# Patient Record
Sex: MALE | Race: WHITE | NOT HISPANIC OR LATINO
[De-identification: names, ages, dates, MRNs, and addresses within clinical notes are randomized per-mention and may not be internally consistent; named-entity substitution may affect disease eponyms.]

---

## 2017-01-31 ENCOUNTER — TRANSCRIPTION ENCOUNTER (OUTPATIENT)
Age: 72
End: 2017-01-31

## 2017-02-19 PROBLEM — Z00.00 ENCOUNTER FOR PREVENTIVE HEALTH EXAMINATION: Status: ACTIVE | Noted: 2017-02-19

## 2017-03-29 ENCOUNTER — APPOINTMENT (OUTPATIENT)
Dept: UROLOGY | Facility: CLINIC | Age: 72
End: 2017-03-29

## 2017-04-24 ENCOUNTER — OUTPATIENT (OUTPATIENT)
Dept: OUTPATIENT SERVICES | Facility: HOSPITAL | Age: 72
LOS: 1 days | Discharge: HOME | End: 2017-04-24

## 2017-06-27 DIAGNOSIS — M54.6 PAIN IN THORACIC SPINE: ICD-10-CM

## 2017-07-10 ENCOUNTER — APPOINTMENT (OUTPATIENT)
Dept: UROLOGY | Facility: CLINIC | Age: 72
End: 2017-07-10

## 2017-07-10 VITALS
HEART RATE: 93 BPM | HEIGHT: 70 IN | DIASTOLIC BLOOD PRESSURE: 81 MMHG | BODY MASS INDEX: 34.93 KG/M2 | SYSTOLIC BLOOD PRESSURE: 128 MMHG | WEIGHT: 244 LBS

## 2017-07-10 DIAGNOSIS — Z86.79 PERSONAL HISTORY OF OTHER DISEASES OF THE CIRCULATORY SYSTEM: ICD-10-CM

## 2017-07-10 DIAGNOSIS — E78.00 PURE HYPERCHOLESTEROLEMIA, UNSPECIFIED: ICD-10-CM

## 2017-07-10 DIAGNOSIS — Z78.9 OTHER SPECIFIED HEALTH STATUS: ICD-10-CM

## 2017-07-10 DIAGNOSIS — F17.210 NICOTINE DEPENDENCE, CIGARETTES, UNCOMPLICATED: ICD-10-CM

## 2017-07-10 DIAGNOSIS — I10 ESSENTIAL (PRIMARY) HYPERTENSION: ICD-10-CM

## 2017-07-10 RX ORDER — ATORVASTATIN CALCIUM 80 MG/1
80 TABLET, FILM COATED ORAL
Refills: 0 | Status: ACTIVE | COMMUNITY

## 2017-08-18 ENCOUNTER — OUTPATIENT (OUTPATIENT)
Dept: OUTPATIENT SERVICES | Facility: HOSPITAL | Age: 72
LOS: 1 days | Discharge: HOME | End: 2017-08-18

## 2017-08-18 DIAGNOSIS — R05 COUGH: ICD-10-CM

## 2017-10-11 ENCOUNTER — OTHER (OUTPATIENT)
Age: 72
End: 2017-10-11

## 2017-10-16 ENCOUNTER — APPOINTMENT (OUTPATIENT)
Dept: UROLOGY | Facility: CLINIC | Age: 72
End: 2017-10-16

## 2017-10-16 VITALS
HEIGHT: 70 IN | SYSTOLIC BLOOD PRESSURE: 138 MMHG | BODY MASS INDEX: 35.79 KG/M2 | DIASTOLIC BLOOD PRESSURE: 91 MMHG | WEIGHT: 250 LBS | HEART RATE: 94 BPM

## 2017-10-26 ENCOUNTER — OTHER (OUTPATIENT)
Age: 72
End: 2017-10-26

## 2017-10-27 ENCOUNTER — OUTPATIENT (OUTPATIENT)
Dept: OUTPATIENT SERVICES | Facility: HOSPITAL | Age: 72
LOS: 1 days | Discharge: HOME | End: 2017-10-27

## 2017-10-27 DIAGNOSIS — R29.3 ABNORMAL POSTURE: ICD-10-CM

## 2017-10-27 DIAGNOSIS — R52 PAIN, UNSPECIFIED: ICD-10-CM

## 2017-10-30 ENCOUNTER — APPOINTMENT (OUTPATIENT)
Dept: UROLOGY | Facility: CLINIC | Age: 72
End: 2017-10-30

## 2017-10-30 ENCOUNTER — APPOINTMENT (OUTPATIENT)
Dept: UROLOGY | Facility: CLINIC | Age: 72
End: 2017-10-30
Payer: MEDICARE

## 2017-10-30 VITALS
BODY MASS INDEX: 35.79 KG/M2 | HEART RATE: 89 BPM | DIASTOLIC BLOOD PRESSURE: 95 MMHG | SYSTOLIC BLOOD PRESSURE: 137 MMHG | HEIGHT: 70 IN | WEIGHT: 250 LBS

## 2017-10-30 PROCEDURE — 99213 OFFICE O/P EST LOW 20 MIN: CPT | Mod: 57,25

## 2017-10-30 PROCEDURE — 11980 IMPLANT HORMONE PELLET(S): CPT

## 2017-10-30 PROCEDURE — S0189: CPT

## 2017-10-30 NOTE — PHYSICAL EXAM
[General Appearance - Well Developed] : well developed [General Appearance - Well Nourished] : well nourished [Normal Appearance] : normal appearance [Well Groomed] : well groomed [General Appearance - In No Acute Distress] : no acute distress [Abdomen Soft] : soft [Abdomen Tenderness] : non-tender [Costovertebral Angle Tenderness] : no ~M costovertebral angle tenderness [Penis Abnormality] : normal circumcised penis [Epididymis] : the epididymides were normal [Testes Tenderness] : no tenderness of the testes [Testes Mass (___cm)] : there were no testicular masses [FreeTextEntry1] : mild peripheral efdema [] : no respiratory distress [Respiration, Rhythm And Depth] : normal respiratory rhythm and effort [Exaggerated Use Of Accessory Muscles For Inspiration] : no accessory muscle use [Oriented To Time, Place, And Person] : oriented to person, place, and time [Affect] : the affect was normal [Mood] : the mood was normal [Not Anxious] : not anxious [Normal Station and Gait] : the gait and station were normal for the patient's age

## 2017-10-30 NOTE — ASSESSMENT
[FreeTextEntry1] : He’s doing well with current regimen. I will discuss with his primary care physician the use of tramadol as opposed to codeine related analgesics.

## 2017-10-30 NOTE — HISTORY OF PRESENT ILLNESS
[FreeTextEntry1] : Jose Roberto had received his last dose of Testopel on July 10, 2017 3 ½ months ago. Blood was done on October 20 which showed him now to be just about at the low-end so he was brought in for repeat dosing.\par \par As long as the testosterone is at a decent level is working well with formula number six of tri-mix injecting anywhere from a half to one mL depending on how low his testosterone level is getting and having satisfactory sex three times per week. He wakes three times a night to void but it’s with a large volume and then knowledge is that at the end of the day his feet are little more swollen than earlier.\par \par Please note we discussed it is taking occasional hydrocodone though he says oxycodone makes him aitch. He tells me his doctors aware of this gives and give him an antihistamine; he’s been doing okay. I question whether we should consider something along the lines of tramadol at least as a test dose to see if that will make him itch as well.\par  [Currently Experiencing ___] :  [unfilled] [Nocturia] : nocturia [Erectile Dysfunction] : Erectile Dysfunction

## 2017-10-30 NOTE — LETTER HEADER
[Mohan Hernández MD] : Mohan Hernández MD [Director of Urology] : Director of Urology [[address]] : [address] [[address]] : [address] [[place] [address]] : [place] [address] [Tel [phone]] : Tel [phone] [Fax [phone]] : Fax [phone]

## 2017-10-30 NOTE — LETTER BODY
[FreeTextEntry2] : Aamir Woods MD\par [address] [place] [address]\par [place] [id number] [Dear  ___] : Dear  [unfilled], [Attached please find my note.] : Attached please find my note. [Thank you very much for allowing me to participate in the care of this patient. If you have any questions, please do not hesitate to contact me] : Thank you very much for allowing me to participate in the care of this patient. If you have any questions, please do not hesitate to contact me.

## 2017-11-16 ENCOUNTER — OUTPATIENT (OUTPATIENT)
Dept: OUTPATIENT SERVICES | Facility: HOSPITAL | Age: 72
LOS: 1 days | Discharge: HOME | End: 2017-11-16

## 2017-11-16 DIAGNOSIS — M54.2 CERVICALGIA: ICD-10-CM

## 2017-11-18 ENCOUNTER — TRANSCRIPTION ENCOUNTER (OUTPATIENT)
Age: 72
End: 2017-11-18

## 2017-11-28 ENCOUNTER — OUTPATIENT (OUTPATIENT)
Dept: OUTPATIENT SERVICES | Facility: HOSPITAL | Age: 72
LOS: 1 days | Discharge: HOME | End: 2017-11-28

## 2017-11-28 DIAGNOSIS — H33.033 RETINAL DETACHMENT WITH GIANT RETINAL TEAR, BILATERAL: ICD-10-CM

## 2018-02-14 ENCOUNTER — APPOINTMENT (OUTPATIENT)
Dept: UROLOGY | Facility: CLINIC | Age: 73
End: 2018-02-14
Payer: COMMERCIAL

## 2018-02-14 VITALS
DIASTOLIC BLOOD PRESSURE: 92 MMHG | BODY MASS INDEX: 35.79 KG/M2 | SYSTOLIC BLOOD PRESSURE: 169 MMHG | HEART RATE: 126 BPM | WEIGHT: 250 LBS | HEIGHT: 70 IN

## 2018-02-14 VITALS
DIASTOLIC BLOOD PRESSURE: 71 MMHG | SYSTOLIC BLOOD PRESSURE: 129 MMHG | WEIGHT: 250 LBS | HEIGHT: 70 IN | BODY MASS INDEX: 35.79 KG/M2 | HEART RATE: 87 BPM

## 2018-02-14 PROCEDURE — 11980 IMPLANT HORMONE PELLET(S): CPT

## 2018-02-14 PROCEDURE — 99213 OFFICE O/P EST LOW 20 MIN: CPT | Mod: 57,25

## 2018-02-14 PROCEDURE — S0189: CPT

## 2018-02-16 ENCOUNTER — TRANSCRIPTION ENCOUNTER (OUTPATIENT)
Age: 73
End: 2018-02-16

## 2018-03-08 ENCOUNTER — OUTPATIENT (OUTPATIENT)
Dept: OUTPATIENT SERVICES | Facility: HOSPITAL | Age: 73
LOS: 1 days | Discharge: HOME | End: 2018-03-08

## 2018-03-08 ENCOUNTER — RESULT REVIEW (OUTPATIENT)
Age: 73
End: 2018-03-08

## 2018-03-08 VITALS
TEMPERATURE: 98 F | SYSTOLIC BLOOD PRESSURE: 142 MMHG | HEIGHT: 70 IN | DIASTOLIC BLOOD PRESSURE: 83 MMHG | WEIGHT: 244.93 LBS | HEART RATE: 91 BPM

## 2018-03-08 VITALS — HEART RATE: 72 BPM | SYSTOLIC BLOOD PRESSURE: 130 MMHG | RESPIRATION RATE: 18 BRPM | DIASTOLIC BLOOD PRESSURE: 72 MMHG

## 2018-03-08 DIAGNOSIS — Z95.810 PRESENCE OF AUTOMATIC (IMPLANTABLE) CARDIAC DEFIBRILLATOR: Chronic | ICD-10-CM

## 2018-03-08 DIAGNOSIS — Z95.1 PRESENCE OF AORTOCORONARY BYPASS GRAFT: Chronic | ICD-10-CM

## 2018-03-08 NOTE — H&P ADULT - HISTORY OF PRESENT ILLNESS
72 year old male is here for outpatient EGD for evaluation for anemia and guaiac positive stool. 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

## 2018-03-08 NOTE — H&P ADULT - NSHPPHYSICALEXAM_GEN_ALL_CORE
PHYSICAL EXAM:   Vital Signs:  Vital Signs Last 24 Hrs  T(C): 36.5 (08 Mar 2018 07:08), Max: 36.5 (08 Mar 2018 06:46)  T(F): 97.7 (08 Mar 2018 06:46), Max: 97.7 (08 Mar 2018 06:46)  HR: 71 (08 Mar 2018 08:02) (70 - 91)  BP: 112/68 (08 Mar 2018 08:02) (112/68 - 142/83)  BP(mean): --  RR: 18 (08 Mar 2018 08:02) (18 - 18)  SpO2: 95% (08 Mar 2018 08:02) (95% - 96%)  Daily Height in cm: 177.8 (08 Mar 2018 07:08)    Daily     GENERAL:  Appears stated age, well-groomed, well-nourished, no distress  HEENT:  NC/AT,  conjunctivae clear and pink, no thyromegaly, nodules, adenopathy, no JVD, sclera -anicteric  CHEST:  Full & symmetric excursion, no increased effort, breath sounds clear  HEART:  Regular rhythm, S1, S2, no murmur/rub/S3/S4, no abdominal bruit, no edema  ABDOMEN:  Soft, non-tender, non-distended, normoactive bowel sounds,  no masses ,no hepato-splenomegaly, no signs of chronic liver disease  EXTEREMITIES:  no cyanosis,clubbing or edema  SKIN:  No rash/erythema/ecchymoses/petechiae/wounds/abscess/warm/dry  NEURO:  Alert, oriented, no asterixis, no tremor, no encephalopathy

## 2018-03-12 DIAGNOSIS — D64.9 ANEMIA, UNSPECIFIED: ICD-10-CM

## 2018-03-12 DIAGNOSIS — K31.819 ANGIODYSPLASIA OF STOMACH AND DUODENUM WITHOUT BLEEDING: ICD-10-CM

## 2018-03-12 DIAGNOSIS — K29.50 UNSPECIFIED CHRONIC GASTRITIS WITHOUT BLEEDING: ICD-10-CM

## 2018-03-12 LAB — SURGICAL PATHOLOGY STUDY: SIGNIFICANT CHANGE UP

## 2018-03-15 ENCOUNTER — RESULT REVIEW (OUTPATIENT)
Age: 73
End: 2018-03-15

## 2018-03-15 ENCOUNTER — OUTPATIENT (OUTPATIENT)
Dept: OUTPATIENT SERVICES | Facility: HOSPITAL | Age: 73
LOS: 1 days | Discharge: HOME | End: 2018-03-15

## 2018-03-15 VITALS
TEMPERATURE: 98 F | SYSTOLIC BLOOD PRESSURE: 142 MMHG | HEART RATE: 91 BPM | WEIGHT: 248.02 LBS | DIASTOLIC BLOOD PRESSURE: 83 MMHG | HEIGHT: 70 IN

## 2018-03-15 VITALS — SYSTOLIC BLOOD PRESSURE: 137 MMHG | HEART RATE: 68 BPM | DIASTOLIC BLOOD PRESSURE: 90 MMHG | RESPIRATION RATE: 18 BRPM

## 2018-03-15 DIAGNOSIS — Z95.1 PRESENCE OF AORTOCORONARY BYPASS GRAFT: Chronic | ICD-10-CM

## 2018-03-15 DIAGNOSIS — Z95.810 PRESENCE OF AUTOMATIC (IMPLANTABLE) CARDIAC DEFIBRILLATOR: Chronic | ICD-10-CM

## 2018-03-15 NOTE — ASU PATIENT PROFILE, ADULT - PMH
AICD (automatic cardioverter/defibrillator) present    GERD (gastroesophageal reflux disease)    High cholesterol    HTN (hypertension)    Myocardial infarct  10 years ago

## 2018-03-19 DIAGNOSIS — K57.30 DIVERTICULOSIS OF LARGE INTESTINE WITHOUT PERFORATION OR ABSCESS WITHOUT BLEEDING: ICD-10-CM

## 2018-03-19 DIAGNOSIS — D64.9 ANEMIA, UNSPECIFIED: ICD-10-CM

## 2018-03-19 DIAGNOSIS — K64.8 OTHER HEMORRHOIDS: ICD-10-CM

## 2018-03-19 DIAGNOSIS — Z12.11 ENCOUNTER FOR SCREENING FOR MALIGNANT NEOPLASM OF COLON: ICD-10-CM

## 2018-03-19 DIAGNOSIS — D12.0 BENIGN NEOPLASM OF CECUM: ICD-10-CM

## 2018-03-19 DIAGNOSIS — K64.4 RESIDUAL HEMORRHOIDAL SKIN TAGS: ICD-10-CM

## 2018-03-19 DIAGNOSIS — K55.20 ANGIODYSPLASIA OF COLON WITHOUT HEMORRHAGE: ICD-10-CM

## 2018-03-19 LAB — SURGICAL PATHOLOGY STUDY: SIGNIFICANT CHANGE UP

## 2018-03-27 ENCOUNTER — OUTPATIENT (OUTPATIENT)
Dept: OUTPATIENT SERVICES | Facility: HOSPITAL | Age: 73
LOS: 1 days | Discharge: HOME | End: 2018-03-27

## 2018-03-27 VITALS
HEART RATE: 96 BPM | WEIGHT: 244.93 LBS | SYSTOLIC BLOOD PRESSURE: 139 MMHG | HEIGHT: 70 IN | RESPIRATION RATE: 20 BRPM | DIASTOLIC BLOOD PRESSURE: 85 MMHG | TEMPERATURE: 98 F

## 2018-03-27 DIAGNOSIS — D50.0 IRON DEFICIENCY ANEMIA SECONDARY TO BLOOD LOSS (CHRONIC): ICD-10-CM

## 2018-03-27 DIAGNOSIS — Z95.1 PRESENCE OF AORTOCORONARY BYPASS GRAFT: Chronic | ICD-10-CM

## 2018-03-27 DIAGNOSIS — K55.20 ANGIODYSPLASIA OF COLON WITHOUT HEMORRHAGE: ICD-10-CM

## 2018-03-27 DIAGNOSIS — D50.9 IRON DEFICIENCY ANEMIA, UNSPECIFIED: ICD-10-CM

## 2018-03-27 DIAGNOSIS — Z95.810 PRESENCE OF AUTOMATIC (IMPLANTABLE) CARDIAC DEFIBRILLATOR: Chronic | ICD-10-CM

## 2018-03-27 NOTE — H&P ADULT - NSHPPHYSICALEXAM_GEN_ALL_CORE
PHYSICAL EXAM:   Vital Signs:  Vital Signs Last 24 Hrs  T(C): 36.8 (27 Mar 2018 07:47), Max: 36.8 (27 Mar 2018 07:47)  T(F): 98.3 (27 Mar 2018 07:47), Max: 98.3 (27 Mar 2018 07:47)  HR: 96 (27 Mar 2018 07:47) (96 - 96)  BP: 139/85 (27 Mar 2018 07:47) (139/85 - 139/85)  BP(mean): --  RR: 20 (27 Mar 2018 07:47) (20 - 20)  SpO2: --  Daily Height in cm: 177.8 (27 Mar 2018 07:47)    Daily     GENERAL:  Appears stated age, well-groomed, well-nourished, no distress  HEENT:  NC/AT,  conjunctivae clear and pink, no thyromegaly, nodules, adenopathy, no JVD, sclera -anicteric  CHEST:  Full & symmetric excursion, no increased effort, breath sounds clear  HEART:  Regular rhythm, S1, S2, no murmur/rub/S3/S4, no abdominal bruit, no edema  ABDOMEN:  Soft, non-tender, non-distended, normoactive bowel sounds,  no masses ,no hepato-splenomegaly, no signs of chronic liver disease  EXTEREMITIES:  no cyanosis,clubbing or edema  SKIN:  No rash/erythema/ecchymoses/petechiae/wounds/abscess/warm/dry  NEURO:  Alert, oriented, no asterixis, no tremor, no encephalopathy

## 2018-05-04 ENCOUNTER — TRANSCRIPTION ENCOUNTER (OUTPATIENT)
Age: 73
End: 2018-05-04

## 2018-05-09 ENCOUNTER — APPOINTMENT (OUTPATIENT)
Dept: UROLOGY | Facility: CLINIC | Age: 73
End: 2018-05-09
Payer: MEDICARE

## 2018-05-31 ENCOUNTER — APPOINTMENT (OUTPATIENT)
Dept: UROLOGY | Facility: CLINIC | Age: 73
End: 2018-05-31
Payer: MEDICARE

## 2018-05-31 VITALS
HEART RATE: 90 BPM | HEIGHT: 70 IN | BODY MASS INDEX: 35.79 KG/M2 | WEIGHT: 250 LBS | SYSTOLIC BLOOD PRESSURE: 136 MMHG | DIASTOLIC BLOOD PRESSURE: 82 MMHG

## 2018-05-31 PROCEDURE — 99213 OFFICE O/P EST LOW 20 MIN: CPT | Mod: 57,25

## 2018-05-31 PROCEDURE — S0189: CPT

## 2018-05-31 PROCEDURE — 11980 IMPLANT HORMONE PELLET(S): CPT

## 2018-05-31 RX ORDER — GLUC/MSM/COLGN2/HYAL/ANTIARTH3 375-375-20
TABLET ORAL
Refills: 0 | Status: ACTIVE | COMMUNITY

## 2018-06-06 ENCOUNTER — OUTPATIENT (OUTPATIENT)
Dept: OUTPATIENT SERVICES | Facility: HOSPITAL | Age: 73
LOS: 1 days | Discharge: HOME | End: 2018-06-06

## 2018-06-06 DIAGNOSIS — J30.9 ALLERGIC RHINITIS, UNSPECIFIED: ICD-10-CM

## 2018-06-06 DIAGNOSIS — Z95.810 PRESENCE OF AUTOMATIC (IMPLANTABLE) CARDIAC DEFIBRILLATOR: Chronic | ICD-10-CM

## 2018-06-06 DIAGNOSIS — Z95.1 PRESENCE OF AORTOCORONARY BYPASS GRAFT: Chronic | ICD-10-CM

## 2018-06-06 DIAGNOSIS — D64.9 ANEMIA, UNSPECIFIED: ICD-10-CM

## 2018-07-27 PROBLEM — Z78.9 ALCOHOL USE: Status: ACTIVE | Noted: 2017-07-10

## 2018-09-06 ENCOUNTER — APPOINTMENT (OUTPATIENT)
Dept: UROLOGY | Facility: CLINIC | Age: 73
End: 2018-09-06
Payer: MEDICARE

## 2018-09-06 VITALS
HEIGHT: 70 IN | SYSTOLIC BLOOD PRESSURE: 131 MMHG | WEIGHT: 250 LBS | DIASTOLIC BLOOD PRESSURE: 81 MMHG | HEART RATE: 72 BPM | BODY MASS INDEX: 35.79 KG/M2

## 2018-09-06 PROBLEM — I21.9 ACUTE MYOCARDIAL INFARCTION, UNSPECIFIED: Chronic | Status: ACTIVE | Noted: 2018-03-08

## 2018-09-06 PROBLEM — I10 ESSENTIAL (PRIMARY) HYPERTENSION: Chronic | Status: ACTIVE | Noted: 2018-03-08

## 2018-09-06 PROBLEM — Z95.810 PRESENCE OF AUTOMATIC (IMPLANTABLE) CARDIAC DEFIBRILLATOR: Chronic | Status: ACTIVE | Noted: 2018-03-08

## 2018-09-06 PROBLEM — K21.9 GASTRO-ESOPHAGEAL REFLUX DISEASE WITHOUT ESOPHAGITIS: Chronic | Status: ACTIVE | Noted: 2018-03-08

## 2018-09-06 PROBLEM — E78.00 PURE HYPERCHOLESTEROLEMIA, UNSPECIFIED: Chronic | Status: ACTIVE | Noted: 2018-03-08

## 2018-09-06 PROCEDURE — 11980 IMPLANT HORMONE PELLET(S): CPT

## 2018-09-06 PROCEDURE — 99213 OFFICE O/P EST LOW 20 MIN: CPT | Mod: 25

## 2018-09-06 PROCEDURE — S0189: CPT

## 2018-09-06 RX ORDER — POLYETHYLENE GLYCOL 3350, SODIUM SULFATE, SODIUM CHLORIDE, POTASSIUM CHLORIDE, ASCORBIC ACID, SODIUM ASCORBATE 7.5-2.691G
100 KIT ORAL
Qty: 1 | Refills: 0 | Status: COMPLETED | COMMUNITY
Start: 2018-03-13

## 2018-09-06 RX ORDER — CEFDINIR 300 MG/1
300 CAPSULE ORAL
Qty: 20 | Refills: 0 | Status: COMPLETED | COMMUNITY
Start: 2018-03-08

## 2018-09-06 RX ORDER — PROMETHAZINE AND PHENYLEPHRINE HYDROCHLORIDE AND CODEINE PHOSPHATE 10; 6.25; 5 MG/5ML; MG/5ML; MG/5ML
6.25-5-1 SOLUTION ORAL
Qty: 118 | Refills: 0 | Status: COMPLETED | COMMUNITY
Start: 2018-03-07

## 2018-09-06 RX ORDER — LEVOFLOXACIN 500 MG/1
500 TABLET, FILM COATED ORAL
Qty: 10 | Refills: 0 | Status: COMPLETED | COMMUNITY
Start: 2018-03-07

## 2018-09-06 RX ORDER — PREDNISONE 20 MG/1
20 TABLET ORAL
Qty: 6 | Refills: 0 | Status: COMPLETED | COMMUNITY
Start: 2018-05-09

## 2018-09-06 NOTE — LETTER BODY
[Dear  ___] : Dear  [unfilled], [Courtesy Letter:] : I had the pleasure of seeing your patient, [unfilled], in my office today. [Please see my note below.] : Please see my note below. [Sincerely,] : Sincerely, [FreeTextEntry2] : Aamir Woods MD\par [address] [place] [address]\[place] [address] \par

## 2018-09-06 NOTE — ASSESSMENT
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 He acknowledges that he injected himself recently and was too much in a rush so we didn’t hold compression. I cautioned him against that as it is damaging to the sinusoidal tissue and if it keeps happening he may no longer respond to injections and need an implant.\par \par His blood levels show his free and bioavailable to be just under the lower limit of normal and that was drawn almost 2 weeks ago. He will therefore go ahead and give him six pellets. Please see the hormone administration note for the specifics.\par \par

## 2018-09-06 NOTE — HISTORY OF PRESENT ILLNESS
[FreeTextEntry1] : He uses tri-mix which is working well enough. His blood test levels came back low by Gen. criteria though if you consider someone over 70s range of values he still okay we go by physiologic not prevalence levels on this has been discussed with them in the past and review today. [Currently Experiencing ___] :  [unfilled] [Nocturia] : nocturia [Erectile Dysfunction] : Erectile Dysfunction

## 2018-09-06 NOTE — PHYSICAL EXAM
[General Appearance - Well Developed] : well developed [General Appearance - Well Nourished] : well nourished [Normal Appearance] : normal appearance [Well Groomed] : well groomed [General Appearance - In No Acute Distress] : no acute distress [FreeTextEntry1] : fibrosis distal 1/3 right side [Edema] : no peripheral edema [] : no respiratory distress [Respiration, Rhythm And Depth] : normal respiratory rhythm and effort [Exaggerated Use Of Accessory Muscles For Inspiration] : no accessory muscle use [Oriented To Time, Place, And Person] : oriented to person, place, and time [Affect] : the affect was normal [Mood] : the mood was normal [Not Anxious] : not anxious [Normal Station and Gait] : the gait and station were normal for the patient's age

## 2018-09-06 NOTE — LETTER HEADER
[FreeTextEntry3] : Mohan Hernández M.D.\par Director of Urology\par [facility]\par [address] [place] [address], [address]\[place] [address]

## 2018-11-19 NOTE — ASU PREOP CHECKLIST - HEIGHT IN FEET
Ventricular Rate : 86   Atrial Rate : 86   P-R Interval : 242   QRS Duration : 96   Q-T Interval : 400   QTC Calculation(Bezet) : 478   P Axis : 29   R Axis : -33   T Axis : 2   Diagnosis : Sinus rhythm with 1st degree A-V block~Left axis deviation~Inferior infarct (cited on or before 29-NOV-2013)~Cannot rule out Anterior infarct (cited on or before 29-NOV-2013)~Abnormal ECG~When compared with ECG of 29-NOV-2013 15:42,~IN interval has increased~Vent. rate has increased BY  32 BPM~Questionable change in QRS duration~Confirmed by AUDIE CHUNG ([id number]) on 5/16/2018 2:02:52 PM     
5

## 2018-12-06 ENCOUNTER — APPOINTMENT (OUTPATIENT)
Dept: UROLOGY | Facility: CLINIC | Age: 73
End: 2018-12-06
Payer: MEDICARE

## 2018-12-06 VITALS
DIASTOLIC BLOOD PRESSURE: 82 MMHG | SYSTOLIC BLOOD PRESSURE: 133 MMHG | BODY MASS INDEX: 35.79 KG/M2 | HEIGHT: 70 IN | HEART RATE: 85 BPM | WEIGHT: 250 LBS

## 2018-12-06 PROCEDURE — 11980 IMPLANT HORMONE PELLET(S): CPT

## 2018-12-06 PROCEDURE — S0189: CPT

## 2018-12-06 PROCEDURE — 99213 OFFICE O/P EST LOW 20 MIN: CPT | Mod: 25

## 2018-12-06 RX ORDER — CLOBETASOL PROPIONATE 0.5 MG/ML
0.05 SOLUTION TOPICAL
Qty: 50 | Refills: 0 | Status: COMPLETED | COMMUNITY
Start: 2018-05-05 | End: 2018-12-06

## 2018-12-06 NOTE — LETTER HEADER
[FreeTextEntry3] : Mohan Hernández M.D.\par Director of Urology\par [facility]\par [address] [place] [address], [address]\[place] [address]

## 2018-12-06 NOTE — HISTORY OF PRESENT ILLNESS
[Currently Experiencing ___] :  [unfilled] [Nocturia] : nocturia [Erectile Dysfunction] : Erectile Dysfunction [None] : None [FreeTextEntry1] : Jose Roberto is on trim mix # 16 (30/6/100) using 0.2-0.25 lasting about an hour or less.\par \par He also is on testopel having gotten his last dose on 09/06/2018, labs done 11/30/18 and here to see if he is due for more\par \par His Lower Urinary Tract Symptoms (LUTS) are without change and being managed by Dr. Chaudhary.\par Finally he’s developed some lesions on the ventral surface of his shaft just distal to the penile scrotal junction and wants to know what they are.\par  [Urinary Urgency] : urinary urgency [Urinary Frequency] : urinary frequency

## 2018-12-06 NOTE — PHYSICAL EXAM
[General Appearance - Well Developed] : well developed [General Appearance - Well Nourished] : well nourished [Normal Appearance] : normal appearance [Well Groomed] : well groomed [General Appearance - In No Acute Distress] : no acute distress [Penis Abnormality] : normal uncircumcised penis [Testes Tenderness] : no tenderness of the testes [Testes Mass (___cm)] : there were no testicular masses [FreeTextEntry1] : 4-5 mm condyloma at the ventral base, mild stable thickening of the dital right shaft [] : no respiratory distress [Respiration, Rhythm And Depth] : normal respiratory rhythm and effort [Exaggerated Use Of Accessory Muscles For Inspiration] : no accessory muscle use [Oriented To Time, Place, And Person] : oriented to person, place, and time [Affect] : the affect was normal [Mood] : the mood was normal [Not Anxious] : not anxious [Normal Station and Gait] : the gait and station were normal for the patient's age

## 2018-12-06 NOTE — ASSESSMENT
[FreeTextEntry1] : mild stable fibrosis, continue on tri mix\par stable Lower Urinary Tract Symptoms (LUTS)  managed by Dr. 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 He is aware that even if we take every single one we see this is an intraepithelial organism and he can become celibate something he is unlikely to do an end up with new lesions six months from now. His partner also needs to be checked as they been having sex without condoms and it is up to him but if you get one STD you can get another so I would do STD testing.\par

## 2018-12-06 NOTE — LETTER BODY
[Dear  ___] : Dear  [unfilled], [Courtesy Letter:] : I had the pleasure of seeing your patient, [unfilled], in my office today. [Please see my note below.] : Please see my note below. [Sincerely,] : Sincerely, [FreeTextEntry2] : Aamir Woods MD\par [address] [place] [address]\[place] [address] \par  [DrNatalie  ___] : Dr. SMITH

## 2018-12-28 ENCOUNTER — OUTPATIENT (OUTPATIENT)
Dept: OUTPATIENT SERVICES | Facility: HOSPITAL | Age: 73
LOS: 1 days | Discharge: HOME | End: 2018-12-28

## 2018-12-28 ENCOUNTER — LABORATORY RESULT (OUTPATIENT)
Age: 73
End: 2018-12-28

## 2018-12-28 ENCOUNTER — APPOINTMENT (OUTPATIENT)
Dept: UROLOGY | Facility: CLINIC | Age: 73
End: 2018-12-28
Payer: MEDICARE

## 2018-12-28 DIAGNOSIS — Z95.1 PRESENCE OF AORTOCORONARY BYPASS GRAFT: Chronic | ICD-10-CM

## 2018-12-28 DIAGNOSIS — Z95.810 PRESENCE OF AUTOMATIC (IMPLANTABLE) CARDIAC DEFIBRILLATOR: Chronic | ICD-10-CM

## 2018-12-28 PROCEDURE — 54060 EXCISION OF PENIS LESION(S): CPT

## 2018-12-31 DIAGNOSIS — R89.7 ABNORMAL HISTOLOGICAL FINDINGS IN SPECIMENS FROM OTHER ORGANS, SYSTEMS AND TISSUES: ICD-10-CM

## 2019-01-02 ENCOUNTER — OUTPATIENT (OUTPATIENT)
Dept: OUTPATIENT SERVICES | Facility: HOSPITAL | Age: 74
LOS: 1 days | Discharge: HOME | End: 2019-01-02

## 2019-01-02 DIAGNOSIS — Z95.1 PRESENCE OF AORTOCORONARY BYPASS GRAFT: Chronic | ICD-10-CM

## 2019-01-02 DIAGNOSIS — M54.9 DORSALGIA, UNSPECIFIED: ICD-10-CM

## 2019-01-02 DIAGNOSIS — Z95.810 PRESENCE OF AUTOMATIC (IMPLANTABLE) CARDIAC DEFIBRILLATOR: Chronic | ICD-10-CM

## 2019-01-10 ENCOUNTER — APPOINTMENT (OUTPATIENT)
Dept: UROLOGY | Facility: CLINIC | Age: 74
End: 2019-01-10
Payer: MEDICARE

## 2019-01-10 VITALS
HEART RATE: 95 BPM | HEIGHT: 70 IN | SYSTOLIC BLOOD PRESSURE: 123 MMHG | WEIGHT: 250 LBS | DIASTOLIC BLOOD PRESSURE: 82 MMHG | BODY MASS INDEX: 35.79 KG/M2

## 2019-01-10 PROCEDURE — 99214 OFFICE O/P EST MOD 30 MIN: CPT

## 2019-01-10 NOTE — ASSESSMENT
[FreeTextEntry1] : We reviewed his pathology.\par \par He does have condyloma and understands that this sexually transmitted. He understands that even though the lesions were removed it is still communicable. Safe sex practices were reviewed.\par \par He will continue to apply bacitracin to the area until fully closesd  and to practice good hygiene.\par \par He will follow up in March for review of his hormone panel for Testopel insertion.

## 2019-01-10 NOTE — PHYSICAL EXAM
[General Appearance - Well Developed] : well developed [General Appearance - Well Nourished] : well nourished [Normal Appearance] : normal appearance [Well Groomed] : well groomed [General Appearance - In No Acute Distress] : no acute distress [Abdomen Soft] : soft [Abdomen Tenderness] : non-tender [Costovertebral Angle Tenderness] : no ~M costovertebral angle tenderness [Urethral Meatus] : meatus normal [Penis Abnormality] : normal circumcised penis [Urinary Bladder Findings] : the bladder was normal on palpation [Scrotum] : the scrotum was normal [Edema] : no peripheral edema [] : no respiratory distress [Respiration, Rhythm And Depth] : normal respiratory rhythm and effort [Exaggerated Use Of Accessory Muscles For Inspiration] : no accessory muscle use [Oriented To Time, Place, And Person] : oriented to person, place, and time [Affect] : the affect was normal [Mood] : the mood was normal [Not Anxious] : not anxious [Normal Station and Gait] : the gait and station were normal for the patient's age [No Focal Deficits] : no focal deficits [Femoral Lymph Nodes Enlarged Bilaterally] : femoral [Inguinal Lymph Nodes Enlarged Bilaterally] : inguinal [FreeTextEntry1] : Right lesion excision wound well healed without evidence of infection. Left lesion excision wound demonstrates slight spread of incision superficially without evidence of infection

## 2019-01-10 NOTE — HISTORY OF PRESENT ILLNESS
[None] : None [FreeTextEntry1] : Bernard is a 73-year-old male who had been following for testicular hypofunctioning, erectile dysfunction, and condyloma. He is status post lesion excision on 12/28/18. Post procedure he is doing well and denies any significantly bothersome symptoms.\par \par He is status post Testopel insertion on 12/6/18 is experiencing good response. He is due back in March for blood work and possible Testopel insertion.\par \par Additionally, he is on tri-mix #16 (30/6/100), using 0.2-0.25, with good efficacy and without adverse events.\par \par He presents for wound check and Path report.\par

## 2019-01-10 NOTE — LETTER BODY
[Dear  ___] : Dear  [unfilled], [Courtesy Letter:] : I had the pleasure of seeing your patient, [unfilled], in my office today. [Please see my note below.] : Please see my note below. [Sincerely,] : Sincerely, [FreeTextEntry2] : Aamir Woods MD\par [address] [place] [address]\par [place] [phone]

## 2019-01-10 NOTE — LETTER HEADER
[FreeTextEntry3] : Mohan Hernández M.D.\par Director of Urology\par [facility]\par [address] [place] [address], [address]\[place] [address]

## 2019-03-07 ENCOUNTER — APPOINTMENT (OUTPATIENT)
Dept: UROLOGY | Facility: CLINIC | Age: 74
End: 2019-03-07
Payer: MEDICARE

## 2019-03-07 PROCEDURE — S0189: CPT

## 2019-03-07 PROCEDURE — 11980 IMPLANT HORMONE PELLET(S): CPT

## 2019-03-07 PROCEDURE — 99213 OFFICE O/P EST LOW 20 MIN: CPT | Mod: 25

## 2019-03-07 NOTE — HISTORY OF PRESENT ILLNESS
[FreeTextEntry1] : pt on testotpel and noticed decreased consistency and occasional volume of ejaculate. still feels good orgasm.\par had blood drawn 02/26/2019 and here to see if he is do for his next dose\par \par He is using the tri-mix 30/6/100 using anywhere from a seventh to a quarter of a cc as needed and tolerated he’s doing well without problems\par \par he had penile condyloma excised a few visits ago and is here to see if any more have formed.\par  [Erectile Dysfunction] : Erectile Dysfunction

## 2019-03-07 NOTE — ASSESSMENT
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

## 2019-03-07 NOTE — LETTER HEADER
[FreeTextEntry3] : Mohan Hernández M.D.\par Director of Urology\par [facility]\par [address] [place] [address], [address]\[place] [address]

## 2019-03-07 NOTE — LETTER BODY
[Dear  ___] : Dear  [unfilled], [Courtesy Letter:] : I had the pleasure of seeing your patient, [unfilled], in my office today. [Please see my note below.] : Please see my note below. [Sincerely,] : Sincerely, [FreeTextEntry2] : Aamir Woods MD\par [address] [place] [address]\[place] [address] \par v

## 2019-03-07 NOTE — PHYSICAL EXAM
[General Appearance - Well Developed] : well developed [General Appearance - Well Nourished] : well nourished [Normal Appearance] : normal appearance [Well Groomed] : well groomed [General Appearance - In No Acute Distress] : no acute distress [Abdomen Soft] : soft [Abdomen Tenderness] : non-tender [Costovertebral Angle Tenderness] : no ~M costovertebral angle tenderness [Urethral Meatus] : meatus normal [Penis Abnormality] : normal circumcised penis [Urinary Bladder Findings] : the bladder was normal on palpation [Scrotum] : the scrotum was normal [Epididymis] : the epididymides were normal [Testes Tenderness] : no tenderness of the testes [Testes Mass (___cm)] : there were no testicular masses [Heart Rate And Rhythm] : Heart rate and rhythm were normal [Edema] : no peripheral edema [] : no respiratory distress [Respiration, Rhythm And Depth] : normal respiratory rhythm and effort [Exaggerated Use Of Accessory Muscles For Inspiration] : no accessory muscle use [Auscultation Breath Sounds / Voice Sounds] : lungs were clear to auscultation bilaterally [Oriented To Time, Place, And Person] : oriented to person, place, and time [Affect] : the affect was normal [Mood] : the mood was normal [Not Anxious] : not anxious [Normal Station and Gait] : the gait and station were normal for the patient's age [Inguinal Lymph Nodes Enlarged Bilaterally] : inguinal [FreeTextEntry1] : no new penile lesions, no hernia, testes atrophy, mild fibrosis [No Focal Deficits] : no focal deficits

## 2019-06-17 ENCOUNTER — APPOINTMENT (OUTPATIENT)
Dept: UROLOGY | Facility: CLINIC | Age: 74
End: 2019-06-17
Payer: MEDICARE

## 2019-06-17 VITALS
BODY MASS INDEX: 35.79 KG/M2 | HEART RATE: 96 BPM | HEIGHT: 70 IN | WEIGHT: 250 LBS | SYSTOLIC BLOOD PRESSURE: 133 MMHG | DIASTOLIC BLOOD PRESSURE: 83 MMHG

## 2019-06-17 DIAGNOSIS — N13.9 BENIGN PROSTATIC HYPERPLASIA WITH LOWER URINARY TRACT SYMPMS: ICD-10-CM

## 2019-06-17 DIAGNOSIS — N40.1 BENIGN PROSTATIC HYPERPLASIA WITH LOWER URINARY TRACT SYMPMS: ICD-10-CM

## 2019-06-17 PROCEDURE — 99213 OFFICE O/P EST LOW 20 MIN: CPT | Mod: 25

## 2019-06-17 PROCEDURE — 11980 IMPLANT HORMONE PELLET(S): CPT

## 2019-06-17 PROCEDURE — S0189: CPT

## 2019-06-17 NOTE — HISTORY OF PRESENT ILLNESS
[FreeTextEntry1] : Jose Roberto has been followed for many years for erectile dysfunction and low testosterone. He is currently using a 30/6/100 formula using about 0.15 up to 0.25 mL tells me he used to use it daily he is now “only” using a three times per week. When he uses it works well enough. With respect to his testosterone he gets Testopel periodically is feeling really low, he had blood test done on June 5 and is here for review and possibility of repeat dosing [Erectile Dysfunction] : Erectile Dysfunction [Currently Experiencing ___] :  [unfilled]

## 2019-06-17 NOTE — ASSESSMENT
[FreeTextEntry1] : His physical exam shows no change in. He is moderately fibrotic corpora and his labs show that is due for his next dose of Testopel.\par \par Please note with respect to his voiding he tells me it hasn’t changed. He had been under the care of Dr. Chaudhary who is as of the end of June no longer practicing at [facility] so if he runs into trouble with his voiding he can either go to Dr. Chaudhary’s partner Dr. Mcfarlane were choose to transfer that aspect of us care here as well.\par

## 2019-06-17 NOTE — PHYSICAL EXAM
[General Appearance - Well Developed] : well developed [General Appearance - Well Nourished] : well nourished [Well Groomed] : well groomed [Normal Appearance] : normal appearance [General Appearance - In No Acute Distress] : no acute distress [Abdomen Soft] : soft [Abdomen Tenderness] : non-tender [Costovertebral Angle Tenderness] : no ~M costovertebral angle tenderness [Urethral Meatus] : meatus normal [Penis Abnormality] : normal circumcised penis [Scrotum] : the scrotum was normal [Urinary Bladder Findings] : the bladder was normal on palpation [Epididymis] : the epididymides were normal [Testes Tenderness] : no tenderness of the testes [Testes Mass (___cm)] : there were no testicular masses [Heart Rate And Rhythm] : Heart rate and rhythm were normal [FreeTextEntry1] : no new penile lesions, no hernia, + testes atrophy, mild fibrosis without change. NELSON not done [Respiration, Rhythm And Depth] : normal respiratory rhythm and effort [] : no respiratory distress [Edema] : no peripheral edema [Exaggerated Use Of Accessory Muscles For Inspiration] : no accessory muscle use [Auscultation Breath Sounds / Voice Sounds] : lungs were clear to auscultation bilaterally [Oriented To Time, Place, And Person] : oriented to person, place, and time [Mood] : the mood was normal [Affect] : the affect was normal [Not Anxious] : not anxious [No Focal Deficits] : no focal deficits [Normal Station and Gait] : the gait and station were normal for the patient's age

## 2019-06-17 NOTE — LETTER HEADER
[FreeTextEntry3] : Mohan Hernández M.D.\par Director of Urology\par [facility]\par [address] [place] [address], [address]\[place] [address]

## 2019-06-17 NOTE — LETTER BODY
[Dear  ___] : Dear  [unfilled], [Courtesy Letter:] : I had the pleasure of seeing your patient, [unfilled], in my office today. [Please see my note below.] : Please see my note below. [Sincerely,] : Sincerely, [FreeTextEntry2] : Aamir Woods MD\par [address] [place] [address]\[place] [address] \par v

## 2019-08-06 ENCOUNTER — OUTPATIENT (OUTPATIENT)
Dept: OUTPATIENT SERVICES | Facility: HOSPITAL | Age: 74
LOS: 1 days | Discharge: HOME | End: 2019-08-06
Payer: MEDICARE

## 2019-08-06 DIAGNOSIS — Z95.810 PRESENCE OF AUTOMATIC (IMPLANTABLE) CARDIAC DEFIBRILLATOR: Chronic | ICD-10-CM

## 2019-08-06 DIAGNOSIS — Z95.1 PRESENCE OF AORTOCORONARY BYPASS GRAFT: Chronic | ICD-10-CM

## 2019-08-06 PROCEDURE — 99213 OFFICE O/P EST LOW 20 MIN: CPT

## 2019-08-06 PROCEDURE — 92015 DETERMINE REFRACTIVE STATE: CPT

## 2019-08-07 DIAGNOSIS — H52.203 UNSPECIFIED ASTIGMATISM, BILATERAL: ICD-10-CM

## 2019-08-07 DIAGNOSIS — H33.033 RETINAL DETACHMENT WITH GIANT RETINAL TEAR, BILATERAL: ICD-10-CM

## 2019-09-26 ENCOUNTER — APPOINTMENT (OUTPATIENT)
Dept: UROLOGY | Facility: CLINIC | Age: 74
End: 2019-09-26
Payer: MEDICARE

## 2019-09-26 VITALS
WEIGHT: 250 LBS | HEART RATE: 84 BPM | BODY MASS INDEX: 35.79 KG/M2 | SYSTOLIC BLOOD PRESSURE: 130 MMHG | DIASTOLIC BLOOD PRESSURE: 78 MMHG | HEIGHT: 70 IN

## 2019-09-26 PROCEDURE — 11980 IMPLANT HORMONE PELLET(S): CPT

## 2019-09-26 PROCEDURE — S0189: CPT

## 2019-09-26 PROCEDURE — 99213 OFFICE O/P EST LOW 20 MIN: CPT | Mod: 25

## 2019-09-26 NOTE — LETTER BODY
[Dear  ___] : Dear  [unfilled], [Courtesy Letter:] : I had the pleasure of seeing your patient, [unfilled], in my office today. [Please see my note below.] : Please see my note below. [FreeTextEntry2] : Aamir Woods MD\par [address] [place] [address]\[place] [address] \par  [Sincerely,] : Sincerely,

## 2019-09-26 NOTE — ASSESSMENT
[FreeTextEntry1] : When I examined the penis I see their semi puffy induration on the right side closer to the 7:30 position so he might be injecting subcutaneously. There is also obviously hard for him to hold compression as he can’t squeeze his fingers together so it may be that some of the drug is going proximal. The may be the reason is feeling dizzy and may not be working this is not the right spot. We discussed him learning to inject left-handed and he would come in with the medication I would teach him alternatively he has a steady girlfriend and I can teach her.\par \par He said that he will try with his left hand he feels comfortable enough that he may try and teach his girlfriend himself though I suggested it’s better if she brings her here. If he has further trouble he will come in and will see if it’s the technique the medication or his penis. He is aware that if he can’t do the injections and he wants to have the ability to penetrate vaginally he would then have to consider an implant something that he came to me for over 10 years ago and we’ve been able to do without. However if push has come to shove he will have to make a decision\par

## 2019-09-26 NOTE — HISTORY OF PRESENT ILLNESS
[FreeTextEntry1] : Jose Roberto was last seen on June 17. He will questioning if we can give him more pellets as he is not lasting as long as we’d like we had peak values drawn. He also had trough values drawn beginning of September and told me he feels low for several weeks. He came in today to review the values and decide him for the dosing.\par \par He is also injecting tri-mix using the 30/6/100 mixture at 0.15 mL. He tells me for the last month it hasn’t been working and in fact he’s been getting dizzy. I asked him if he sure is injecting properly and it seems that his right hand which had problems which they hope to fix with right carpal tunnel is getting worse he can no longer move his thumb and I’m not sure how he is injecting at all. In fact it’s hard for him to even hold the penis as he have to manipulate the glans between two fingers and then try and make a fist. \par  [Erectile Dysfunction] : Erectile Dysfunction [Fatigue] : fatigue

## 2019-09-26 NOTE — LETTER HEADER
[FreeTextEntry3] : Mohan Hernández M.D.\par Director of Urology\par [facility]\par [address] [place] [address], [address]\[place] [address]

## 2019-09-26 NOTE — PHYSICAL EXAM
[General Appearance - Well Developed] : well developed [Normal Appearance] : normal appearance [General Appearance - Well Nourished] : well nourished [Well Groomed] : well groomed [General Appearance - In No Acute Distress] : no acute distress [Abdomen Soft] : soft [Abdomen Tenderness] : non-tender [Costovertebral Angle Tenderness] : no ~M costovertebral angle tenderness [Epididymis] : the epididymides were normal [Testes Tenderness] : no tenderness of the testes [Heart Rate And Rhythm] : Heart rate and rhythm were normal [Edema] : no peripheral edema [] : no respiratory distress [Respiration, Rhythm And Depth] : normal respiratory rhythm and effort [Exaggerated Use Of Accessory Muscles For Inspiration] : no accessory muscle use [Auscultation Breath Sounds / Voice Sounds] : lungs were clear to auscultation bilaterally [Oriented To Time, Place, And Person] : oriented to person, place, and time [Affect] : the affect was normal [Mood] : the mood was normal [Not Anxious] : not anxious [Normal Station and Gait] : the gait and station were normal for the patient's age [FreeTextEntry1] : cant close his right hand

## 2019-12-26 ENCOUNTER — APPOINTMENT (OUTPATIENT)
Dept: UROLOGY | Facility: CLINIC | Age: 74
End: 2019-12-26
Payer: MEDICARE

## 2019-12-26 VITALS
HEIGHT: 70 IN | DIASTOLIC BLOOD PRESSURE: 60 MMHG | BODY MASS INDEX: 35.79 KG/M2 | HEART RATE: 83 BPM | WEIGHT: 250 LBS | SYSTOLIC BLOOD PRESSURE: 86 MMHG

## 2019-12-26 PROCEDURE — 99213 OFFICE O/P EST LOW 20 MIN: CPT

## 2019-12-26 RX ORDER — FLUTICASONE PROPIONATE 50 UG/1
50 SPRAY, METERED NASAL
Qty: 16 | Refills: 0 | Status: COMPLETED | COMMUNITY
Start: 2018-05-04 | End: 2019-12-26

## 2019-12-26 RX ORDER — ENALAPRIL MALEATE 10 MG/1
10 TABLET ORAL
Qty: 180 | Refills: 0 | Status: ACTIVE | COMMUNITY
Start: 2019-10-08

## 2019-12-26 RX ORDER — UREA 450 MG/G
45 CREAM TOPICAL
Qty: 225 | Refills: 0 | Status: COMPLETED | COMMUNITY
Start: 2018-11-02 | End: 2019-12-26

## 2019-12-26 RX ORDER — ENALAPRIL MALEATE AND HYDROCHLOROTHIAZIDE 5; 12.5 MG/1; MG/1
5-12.5 TABLET ORAL
Refills: 0 | Status: COMPLETED | COMMUNITY
End: 2019-12-26

## 2019-12-26 RX ORDER — LOSARTAN POTASSIUM 25 MG/1
25 TABLET, FILM COATED ORAL
Qty: 30 | Refills: 0 | Status: COMPLETED | COMMUNITY
Start: 2018-12-21 | End: 2019-12-26

## 2019-12-26 RX ORDER — ATENOLOL 25 MG/1
25 TABLET ORAL
Qty: 90 | Refills: 0 | Status: COMPLETED | COMMUNITY
Start: 2018-08-21 | End: 2019-12-26

## 2019-12-26 RX ORDER — CLOBETASOL PROPIONATE 0.5 MG/G
0.05 OINTMENT TOPICAL
Qty: 60 | Refills: 0 | Status: COMPLETED | COMMUNITY
Start: 2018-11-02 | End: 2019-12-26

## 2019-12-26 RX ORDER — MONTELUKAST 10 MG/1
10 TABLET, FILM COATED ORAL
Qty: 30 | Refills: 0 | Status: COMPLETED | COMMUNITY
Start: 2018-05-09 | End: 2019-12-26

## 2019-12-26 RX ORDER — ENALAPRIL MALEATE 5 MG/1
5 TABLET ORAL
Qty: 90 | Refills: 0 | Status: COMPLETED | COMMUNITY
Start: 2018-08-21 | End: 2019-12-26

## 2019-12-26 RX ORDER — HYDROCODONE BITARTRATE AND ACETAMINOPHEN 5; 325 MG/1; MG/1
5-325 TABLET ORAL
Refills: 0 | Status: COMPLETED | COMMUNITY
End: 2019-12-26

## 2019-12-26 NOTE — LETTER BODY
[Dear  ___] : Dear  [unfilled], [Courtesy Letter:] : I had the pleasure of seeing your patient, [unfilled], in my office today. [Please see my note below.] : Please see my note below. [Sincerely,] : Sincerely, [FreeTextEntry2] : Aamir Woods MD\par [address] [place] [address]\[place] [address] \par

## 2019-12-26 NOTE — HISTORY OF PRESENT ILLNESS
[Currently Experiencing ___] :  [unfilled] [FreeTextEntry1] : Jose Roberto got six pellets on September 26. Of the 30/6/100 formula. He gets a little bit as if he goes much higher than that and with that when the testosterone is at a good level he works well enough. He’s not willing to go to an implant as long as his current function suffices. [Erectile Dysfunction] : Erectile Dysfunction [Fatigue] : fatigue

## 2019-12-26 NOTE — LETTER HEADER
[FreeTextEntry3] : Mohan Hernández M.D.\par Director of Urology\par [facility]\par [address] [place] [address], [address]\[place] [address]

## 2019-12-26 NOTE — PHYSICAL EXAM
[General Appearance - Well Developed] : well developed [General Appearance - Well Nourished] : well nourished [Normal Appearance] : normal appearance [Well Groomed] : well groomed [General Appearance - In No Acute Distress] : no acute distress [Abdomen Soft] : soft [Abdomen Tenderness] : non-tender [Costovertebral Angle Tenderness] : no ~M costovertebral angle tenderness [Epididymis] : the epididymides were normal [Testes Tenderness] : no tenderness of the testes [FreeTextEntry1] : no further edema, no change in fibrosis [Heart Rate And Rhythm] : Heart rate and rhythm were normal [] : no respiratory distress [Edema] : no peripheral edema [Respiration, Rhythm And Depth] : normal respiratory rhythm and effort [Exaggerated Use Of Accessory Muscles For Inspiration] : no accessory muscle use [Auscultation Breath Sounds / Voice Sounds] : lungs were clear to auscultation bilaterally [Affect] : the affect was normal [Oriented To Time, Place, And Person] : oriented to person, place, and time [Mood] : the mood was normal [Not Anxious] : not anxious [Normal Station and Gait] : the gait and station were normal for the patient's age

## 2019-12-26 NOTE — REVIEW OF SYSTEMS
[Feeling Tired] : feeling tired [Erectile Dysfunction] : erectile dysfunction [see HPI] : see HPI [Negative] : Psychiatric

## 2019-12-26 NOTE — ASSESSMENT
[FreeTextEntry1] : He is due for his next dose but unfortunately the company is late in shipping us her supply. He will call him in when we get it in.\par \par With respect to his erections the puffiness he had in the right side of the [place] feels better now he’s being a little more meticulous with his injections\par

## 2020-01-09 ENCOUNTER — APPOINTMENT (OUTPATIENT)
Dept: UROLOGY | Facility: CLINIC | Age: 75
End: 2020-01-09
Payer: MEDICARE

## 2020-01-09 VITALS
HEART RATE: 82 BPM | WEIGHT: 250 LBS | DIASTOLIC BLOOD PRESSURE: 77 MMHG | BODY MASS INDEX: 35.79 KG/M2 | HEIGHT: 70 IN | SYSTOLIC BLOOD PRESSURE: 125 MMHG

## 2020-01-09 PROCEDURE — S0189: CPT

## 2020-01-09 PROCEDURE — 11980 IMPLANT HORMONE PELLET(S): CPT

## 2020-05-27 ENCOUNTER — APPOINTMENT (OUTPATIENT)
Dept: UROLOGY | Facility: CLINIC | Age: 75
End: 2020-05-27
Payer: MEDICARE

## 2020-05-27 VITALS
DIASTOLIC BLOOD PRESSURE: 83 MMHG | HEART RATE: 95 BPM | BODY MASS INDEX: 35.79 KG/M2 | HEIGHT: 70 IN | WEIGHT: 250 LBS | SYSTOLIC BLOOD PRESSURE: 144 MMHG | TEMPERATURE: 96.9 F

## 2020-05-27 PROCEDURE — 99214 OFFICE O/P EST MOD 30 MIN: CPT | Mod: 25

## 2020-05-27 PROCEDURE — S0189: CPT

## 2020-05-27 PROCEDURE — 11980 IMPLANT HORMONE PELLET(S): CPT

## 2020-05-27 NOTE — REVIEW OF SYSTEMS
[Feeling Tired] : feeling tired [see HPI] : see HPI [Erectile Dysfunction] : erectile dysfunction [Negative] : Heme/Lymph

## 2020-06-01 NOTE — PHYSICAL EXAM
[Abdomen Soft] : soft [Abdomen Tenderness] : non-tender [Costovertebral Angle Tenderness] : no ~M costovertebral angle tenderness [Heart Rate And Rhythm] : Heart rate and rhythm were normal [] : no respiratory distress [Respiration, Rhythm And Depth] : normal respiratory rhythm and effort [Exaggerated Use Of Accessory Muscles For Inspiration] : no accessory muscle use [Auscultation Breath Sounds / Voice Sounds] : lungs were clear to auscultation bilaterally [Oriented To Time, Place, And Person] : oriented to person, place, and time [Affect] : the affect was normal [Mood] : the mood was normal [Not Anxious] : not anxious [Urethral Meatus] : meatus normal [Testes Tenderness] : no tenderness of the testes [FreeTextEntry1] : right arm nerve damage

## 2020-06-01 NOTE — HISTORY OF PRESENT ILLNESS
[Currently Experiencing ___] :  [unfilled] [Erectile Dysfunction] : Erectile Dysfunction [Fatigue] : fatigue [FreeTextEntry1] : Jose Roberto has been a patient for many years, he’s been using injection therapy with good result to tell me now is having increasing difficulty because of increasing weakness in his right arm. He is also lost the desire possibly do the difficulty possibly due to the fact that his testosterone, he is also being treated with testopel, is probably low as his last injection was in January and the next one was delayed because of Covid 19. He said it’s not that much of an issue with his girlfriend right now is not doing well herself she’s gained weight and she’s not that interested though he may be feeling differently once his testosterone is replaced.\par \par He is here to review his labs, consider getting his next dose and discuss what other options there are for his sexual dysfunction.\par \par Please note he has a history of general, the Radha thinks he has a new one on his scrotum and is so want to discuss how to take care of it.

## 2020-06-01 NOTE — LETTER BODY
[Dear  ___] : Dear  [unfilled], [Courtesy Letter:] : I had the pleasure of seeing your patient, [unfilled], in my office today. [Please see my note below.] : Please see my note below. [Sincerely,] : Sincerely, [FreeTextEntry2] : Aamir Woods MD\par [address] [place] [address]\[place] [address] \par

## 2020-06-01 NOTE — ASSESSMENT
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 with CONDYLOX®, he thinks he’d rather have it just scraped off as it is small.\par \par Re-his right arm I recommended he go back to neurology to see if there’s something they can do as to continually waste away is not a great option.\par \par With respect to his corrections easily see what he can do on his own once he gets the Testopel on board, see below, there is not sufficient come in possibly with possibly without his girlfriend to see if using the autoinjector makes it easier.\par \par With respect to the Testopel labs are low we will give them six pellets into the right side please see that not listed separately.\par He will get blood and 12 and see me in 13 weeks. If he wants to have the card along with taking care of sooner he will call the office and arrange condyloma when he comes in he is a will or will not break his girlfriend to see how and if they wanted to the injections.\par

## 2020-06-01 NOTE — LETTER HEADER
[FreeTextEntry3] : Mohan Hernández M.D.\par Director of Urology\par [facility]\par [address] [place] [address], [address]\[place] [address]

## 2020-06-24 ENCOUNTER — EMERGENCY (EMERGENCY)
Facility: HOSPITAL | Age: 75
LOS: 0 days | Discharge: HOME | End: 2020-06-24
Attending: EMERGENCY MEDICINE | Admitting: EMERGENCY MEDICINE
Payer: MEDICARE

## 2020-06-24 VITALS
TEMPERATURE: 99 F | WEIGHT: 179.9 LBS | SYSTOLIC BLOOD PRESSURE: 186 MMHG | DIASTOLIC BLOOD PRESSURE: 95 MMHG | HEART RATE: 98 BPM | RESPIRATION RATE: 18 BRPM | OXYGEN SATURATION: 98 %

## 2020-06-24 DIAGNOSIS — I10 ESSENTIAL (PRIMARY) HYPERTENSION: ICD-10-CM

## 2020-06-24 DIAGNOSIS — M54.9 DORSALGIA, UNSPECIFIED: ICD-10-CM

## 2020-06-24 DIAGNOSIS — F17.200 NICOTINE DEPENDENCE, UNSPECIFIED, UNCOMPLICATED: ICD-10-CM
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
## 2020-06-24 DIAGNOSIS — Z95.1 PRESENCE OF AORTOCORONARY BYPASS GRAFT: Chronic | ICD-10-CM
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
## 2020-06-24 DIAGNOSIS — M25.521 PAIN IN RIGHT ELBOW: ICD-10-CM
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
## 2020-06-24 DIAGNOSIS — Z95.810 PRESENCE OF AUTOMATIC (IMPLANTABLE) CARDIAC DEFIBRILLATOR: ICD-10-CM

## 2020-06-24 DIAGNOSIS — E78.00 PURE HYPERCHOLESTEROLEMIA, UNSPECIFIED: ICD-10-CM

## 2020-06-24 DIAGNOSIS — Z95.810 PRESENCE OF AUTOMATIC (IMPLANTABLE) CARDIAC DEFIBRILLATOR: Chronic | ICD-10-CM

## 2020-06-24 DIAGNOSIS — I25.10 ATHEROSCLEROTIC HEART DISEASE OF NATIVE CORONARY ARTERY WITHOUT ANGINA PECTORIS: ICD-10-CM

## 2020-06-24 DIAGNOSIS — S66.911A STRAIN OF UNSPECIFIED MUSCLE, FASCIA AND TENDON AT WRIST AND HAND LEVEL, RIGHT HAND, INITIAL ENCOUNTER: ICD-10-CM
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
## 2020-06-24 DIAGNOSIS — M79.603 PAIN IN ARM, UNSPECIFIED: ICD-10-CM

## 2020-06-24 PROCEDURE — 99283 EMERGENCY DEPT VISIT LOW MDM: CPT

## 2020-06-24 PROCEDURE — 73030 X-RAY EXAM OF SHOULDER: CPT | Mod: 26,RT

## 2020-06-24 PROCEDURE — 73080 X-RAY EXAM OF ELBOW: CPT | Mod: 26,RT

## 2020-06-24 RX ORDER — IBUPROFEN 200 MG
400 TABLET ORAL ONCE
Refills: 0 | Status: COMPLETED | OUTPATIENT
Start: 2020-06-24 | End: 2020-06-24

## 2020-06-24 RX ORDER — OXYCODONE AND ACETAMINOPHEN 5; 325 MG/1; MG/1
1 TABLET ORAL ONCE
Refills: 0 | Status: DISCONTINUED | OUTPATIENT
Start: 2020-06-24 | End: 2020-06-24

## 2020-06-24 RX ADMIN — OXYCODONE AND ACETAMINOPHEN 1 TABLET(S): 5; 325 TABLET ORAL at 01:55

## 2020-06-24 RX ADMIN — Medication 400 MILLIGRAM(S): at 02:23

## 2020-06-24 NOTE — ED PROVIDER NOTE - PHYSICAL EXAMINATION
GEN: Alert & Oriented x 3, No acute distress. Calm, appropriate.  Head and Neck: Normocephalic, atraumatic.   RESP: Lungs clear to auscult bilat. no wheezes, rhonchi or rales. No retractions. Equal air entry.  CARDIO: regular rate and rhythm, no murmurs, rubs or gallops. Normal S1, S2. Radial pulses 2+ bilaterally. No lower extremity edema.  MS: no obvious swelling or deformities. tenderness with palpation to medial aspect of right elbow. Tenderness with palpation to right trapezius. no midline tenderness throughout. FROM of right elbow and shoulder.   SKIN: no rashes/lesions, no petechiae, no ecchymosis.  NEURO: CN II-XII grossly intact. Strength + sensation intact x 4 extremities. Speech and cognition normal.

## 2020-06-24 NOTE — ED PROVIDER NOTE - CARE PROVIDER_API CALL
Christopher Deutsch  Orthopaedic Surgery  [address] [place] [address]  Phone: [phone]  Fax: [phone]  Follow Up Time: 4-6 Days

## 2020-06-24 NOTE — ED PROVIDER NOTE - NS ED ROS FT
GEN: (-) fever, (-) chills  HEENT: (-) HA  CV: (-) chest pain  PULM: (-) sob  GI: (-) abdominal pain,(-) Nausea, (-) Vomiting, (-) Diarrhea  NEURO: (-) weakness, (-) paresthesias  MS: (+) back pain, (+) joint pain, (-)myalgias, (-) swelling  SKIN: (-) rashes, (-) new lesions  HEME: (-) bleeding, (-) ecchymosis

## 2020-06-24 NOTE — ED PROVIDER NOTE - ATTENDING CONTRIBUTION TO CARE
73 yo male with pmh of right carpal tunnel injury, high chol, htn, aicd, cad with 3 stents, neck herniated disc with plate in place, presents to the ER for right arm pain after changing a tire for his SUV earlier today. Pt states it was a bulky tire and he dragged the tire out mostly with using the right UE and felt some pain to the right scapular area and to right elbow. Notes some tingling going on to the right elbow. No bony tenderness to elbow. No shoulder tenderness but +tenderness to scapular region superior-medial area. No weakness/FROM of all  joints. Pulses intact. No other injuries. XRs ordered, pain meds ordered and will place in sling.

## 2020-06-24 NOTE — ED PROVIDER NOTE - CLINICAL SUMMARY MEDICAL DECISION MAKING FREE TEXT BOX
73 yo male presents to the ER for right scapular discomfort and tenderness and right elbow pain s/p changing a tire earlier today. +strain. Xrays neg for fx. Placed in Sling and referred to ortho

## 2020-06-24 NOTE — ED PROVIDER NOTE - NSFOLLOWUPINSTRUCTIONS_ED_ALL_ED_FT
Elbow Sprain    WHAT YOU NEED TO KNOW:    An elbow sprain is caused by a stretched or torn ligament in the elbow joint. Ligaments are the strong tissues that connect bones.    DISCHARGE INSTRUCTIONS:    Return to the emergency department if:     The skin of your injured arm looks bluish or pale (less color than normal).      You have new or increased numbness in your injured arm.    Contact your healthcare provider if:     You have increased swelling and pain in your elbow.      You have new or increased stiffness or trouble moving your injured arm.      You have questions or concerns about your condition or care.    Medicines:     Prescription pain medicine may be given. Ask how to take this medicine safely.      Take your medicine as directed. Contact your healthcare provider if you think your medicine is not helping or if you have side effects. Tell him or her if you are allergic to any medicine. Keep a list of the medicines, vitamins, and herbs you take. Include the amounts, and when and why you take them. Bring the list or the pill bottles to follow-up visits. Carry your medicine list with you in case of an emergency.    Rest your elbow: You will need to rest your elbow for 1 to 2 days after your injury. This will help decrease the risk of more damage to your elbow.    Ice your elbow: Apply ice on your elbow for 15 to 20 minutes every hour or as directed. Use an ice pack, or put crushed ice in a plastic bag. Cover it with a towel. Ice helps prevent tissue damage and decreases swelling and pain.    Compress your elbow: Compression provides support and helps decrease swelling and movement so your elbow can heal. You may be told to keep your elbow wrapped with a tight elastic bandage. Follow instructions about how to apply your bandage.    Elevate your elbow: Elevate your elbow above the level of your heart as often as you can. This will help decrease swelling and pain. Prop your elbow on pillows or blankets to keep it elevated comfortably.     Exercise your elbow: You should begin to exercise your arm in a few days, once you are able to move your elbow without pain. Exercises will help decrease stiffness and improve the strength of your arm. Ask your healthcare provider what kind of exercises you should do.    Prevent another elbow sprain:     Make sure you warm up and stretch before you exercise.      Do not exercise when you feel pain or you are tired.      Wear equipment to protect yourself when you play sports.      Stop exercising and playing sports if your symptoms from a past injury return.    Follow up with your healthcare provider within 1 week: Write down any questions you have so you remember to ask them in your follow-up visits.      Back Pain    Back pain is very common in adults. The cause of back pain is rarely dangerous and the pain often gets better over time. The cause of your back pain may not be known and may include strain of muscles or ligaments, degeneration of the spinal disks, or arthritis. Occasionally the pain may radiate down your leg(s). Over-the-counter medicines to reduce pain and inflammation are often the most helpful. Stretching and remaining active frequently helps the healing process.     SEEK IMMEDIATE MEDICAL CARE IF YOU HAVE THE FOLLOWING SYMPTOMS: bowel or bladder control problems, unusual weakness or numbness in your arms or legs, nausea or vomiting, abdominal pain, fever, dizziness/lightheadedness.

## 2020-06-24 NOTE — ED PROVIDER NOTE - PATIENT PORTAL LINK FT
You can access the FollowMyHealth Patient Portal offered by [facility] by registering at the following website: [email or web address]. By joining [facility]’s FollowMyHealth portal, you will also be able to view your health information using other applications (apps) compatible with our system.

## 2020-06-24 NOTE — ED PROVIDER NOTE - OBJECTIVE STATEMENT
The pt is a 74y M with PMH HTN, CAD s/p cabg, AICD placement is presenting to ED with elbow pain x 1 day. Pt states he was pulling of an SUV tire and felt a pull over the right trapezius. He endorses worsening, throbbing pain to right elbow and right shoulder. aggravated with movement. no relieving factors. Pt has not taken any mediation. associated with intermittent paresthesias down right arm. Pt denies swelling, deformities, ecchymosis, weakness, neck pain, visual changes, cp, sob, fever/chills.

## 2020-09-10 ENCOUNTER — APPOINTMENT (OUTPATIENT)
Dept: UROLOGY | Facility: CLINIC | Age: 75
End: 2020-09-10
Payer: MEDICARE

## 2020-09-10 VITALS
BODY MASS INDEX: 34.36 KG/M2 | HEIGHT: 70 IN | TEMPERATURE: 98.2 F | HEART RATE: 89 BPM | SYSTOLIC BLOOD PRESSURE: 124 MMHG | DIASTOLIC BLOOD PRESSURE: 76 MMHG | WEIGHT: 240 LBS

## 2020-09-10 PROCEDURE — 99213 OFFICE O/P EST LOW 20 MIN: CPT

## 2020-09-10 NOTE — LETTER BODY
[Dear  ___] : Dear  [unfilled], [Courtesy Letter:] : I had the pleasure of seeing your patient, [unfilled], in my office today. [Please see my note below.] : Please see my note below. [Sincerely,] : Sincerely, [FreeTextEntry2] : Aamir Woods MD\par [address] [place] [address]\[place] [address] \par

## 2020-09-10 NOTE — REVIEW OF SYSTEMS
[Feeling Tired] : feeling tired [see HPI] : see HPI [Erectile Dysfunction] : erectile dysfunction [Negative] : Psychiatric

## 2020-09-10 NOTE — LETTER HEADER
[FreeTextEntry3] : Mohan Hernández M.D.\par Director of Urology\par [facility]\par [address] [place] [address], [address]\[place] [address]

## 2020-09-10 NOTE — PHYSICAL EXAM
[Abdomen Soft] : soft [Costovertebral Angle Tenderness] : no ~M costovertebral angle tenderness [Abdomen Tenderness] : non-tender [Urethral Meatus] : meatus normal [Testes Tenderness] : no tenderness of the testes [Heart Rate And Rhythm] : Heart rate and rhythm were normal [] : no respiratory distress [Exaggerated Use Of Accessory Muscles For Inspiration] : no accessory muscle use [Respiration, Rhythm And Depth] : normal respiratory rhythm and effort [Affect] : the affect was normal [Oriented To Time, Place, And Person] : oriented to person, place, and time [Not Anxious] : not anxious [Mood] : the mood was normal [Normal Station and Gait] : the gait and station were normal for the patient's age [FreeTextEntry1] : mild PE

## 2020-09-10 NOTE — HISTORY OF PRESENT ILLNESS
[FreeTextEntry1] : Jose Roberto is a 75-year-old male who been following for quite some time. He has two problems, the first his erectile dysfunction for which he is on tri-mix formula number 16 –30/6/100, Using 0.15 ml if uses 0.2 Or more he finds that he gets dizzy. If he uses a low strength product it doesn't work. He question whether we can switching to quad mix adding atropine into the mixture.\par \par An additional problem is is low testosterone. His last testopel was May, And he usually Gets treatment every 4.5-5 months\par \par Finally, he has penile scrotal condyloma, Pretty much at the penis scrotal junction. He was supposed to arrange for removal. He decided due to other issues that at this point he didn’t want it done. As not seen any growth of new lesions though it harder for him to tell his because of progressive nerve damage to his right upper extremity he has stopped shaving his genitalia [Currently Experiencing ___] :  [unfilled] [Erectile Dysfunction] : Erectile Dysfunction [Fatigue] : fatigue

## 2020-09-10 NOTE — ASSESSMENT
[FreeTextEntry1] : need complete panel not partial panel as he feels low but total still ok and that could be due to SHBG\par \par doesn’t want condyloma treated explained auto inoculation he will think about it\par \par right arm weakness he said he is getting therapy but they are not sure it will work\par \par LUTS stable and he will live with it, Without more medication and he is already taking

## 2020-09-16 LAB — ESTRADIOL SERPL-MCNC: 15 PG/ML

## 2020-09-21 ENCOUNTER — APPOINTMENT (OUTPATIENT)
Dept: UROLOGY | Facility: CLINIC | Age: 75
End: 2020-09-21
Payer: MEDICARE

## 2020-09-21 VITALS
DIASTOLIC BLOOD PRESSURE: 91 MMHG | TEMPERATURE: 98 F | BODY MASS INDEX: 35.65 KG/M2 | SYSTOLIC BLOOD PRESSURE: 145 MMHG | WEIGHT: 249 LBS | HEIGHT: 70 IN | HEART RATE: 91 BPM

## 2020-09-21 LAB
% FREE TESTOSTERONE - ESO: 0.8 %
FREE TESTOSTERONE - ESO: 32 PG/ML
SHBG SERPL-SCNC: 54 NMOL/L
SHBG-ESOTERIX: 53.5 NMOL/L
TESTOST BND SERPL-MCNC: 5.78 NG/DL
TESTOSTERONE BIOAVAILABLE: 35 NG/DL
TESTOSTERONE SERUM - ESO: 399 NG/DL
TESTOSTERONE TOTAL S: 385 NG/DL

## 2020-09-21 PROCEDURE — 11980 IMPLANT HORMONE PELLET(S): CPT

## 2020-09-21 PROCEDURE — 99213 OFFICE O/P EST LOW 20 MIN: CPT | Mod: 25

## 2020-09-21 NOTE — LETTER BODY
[Dear  ___] : Dear  [unfilled], [Courtesy Letter:] : I had the pleasure of seeing your patient, [unfilled], in my office today. [Please see my note below.] : Please see my note below. [Sincerely,] : Sincerely, [FreeTextEntry2] : Aamir Woods MD\par [address] [place] [address]\[place] [address] \par

## 2020-09-21 NOTE — HISTORY OF PRESENT ILLNESS
[Erectile Dysfunction] : Erectile Dysfunction [Fatigue] : fatigue [FreeTextEntry1] : Jose Roberto has been followed on Testopel with periodic checks to see when he still for his next dose. He had repeat bloods here to review. . Of note he tells me that he can usually tell when he is low and he is feeling as if the level is quite low [Currently Experiencing ___] :  [unfilled]

## 2020-09-21 NOTE — PHYSICAL EXAM
[Heart Rate And Rhythm] : Heart rate and rhythm were normal [] : no respiratory distress [Respiration, Rhythm And Depth] : normal respiratory rhythm and effort [Exaggerated Use Of Accessory Muscles For Inspiration] : no accessory muscle use [Oriented To Time, Place, And Person] : oriented to person, place, and time [Affect] : the affect was normal [Mood] : the mood was normal [Not Anxious] : not anxious [Normal Station and Gait] : the gait and station were normal for the patient's age [FreeTextEntry1] : mild PE

## 2020-09-21 NOTE — LETTER HEADER
[FreeTextEntry3] : Mohan Hernández M.D.\par Director of Urology\par [facility]\par [address] [place] [address], [address]\[place] [address]

## 2020-09-21 NOTE — ASSESSMENT
[FreeTextEntry1] : I feel that his levels low enough So that we can give him a replacement especially as he feels low.\par  we will go to six pellets\par \par Please note he is not happy with his erectile response. See what is like but his testosterone comes back up and if he is not happy he can always reconsider an implant. He is aware that at this point if it gets done will probably be done by Dr. Canales

## 2020-11-16 ENCOUNTER — APPOINTMENT (OUTPATIENT)
Dept: OPHTHALMOLOGY | Facility: CLINIC | Age: 75
End: 2020-11-16

## 2020-11-16 ENCOUNTER — OUTPATIENT (OUTPATIENT)
Dept: OUTPATIENT SERVICES | Facility: HOSPITAL | Age: 75
LOS: 1 days | Discharge: HOME | End: 2020-11-16
Payer: MEDICARE

## 2020-11-16 DIAGNOSIS — Z95.810 PRESENCE OF AUTOMATIC (IMPLANTABLE) CARDIAC DEFIBRILLATOR: Chronic | ICD-10-CM

## 2020-11-16 DIAGNOSIS — Z95.1 PRESENCE OF AORTOCORONARY BYPASS GRAFT: Chronic | ICD-10-CM

## 2020-11-16 PROCEDURE — 92014 COMPRE OPH EXAM EST PT 1/>: CPT

## 2020-12-09 ENCOUNTER — OUTPATIENT (OUTPATIENT)
Dept: OUTPATIENT SERVICES | Facility: HOSPITAL | Age: 75
LOS: 1 days | Discharge: HOME | End: 2020-12-09
Payer: MEDICARE

## 2020-12-09 DIAGNOSIS — Z95.1 PRESENCE OF AORTOCORONARY BYPASS GRAFT: Chronic | ICD-10-CM

## 2020-12-09 DIAGNOSIS — Z95.810 PRESENCE OF AUTOMATIC (IMPLANTABLE) CARDIAC DEFIBRILLATOR: Chronic | ICD-10-CM

## 2020-12-09 DIAGNOSIS — M54.9 DORSALGIA, UNSPECIFIED: ICD-10-CM

## 2020-12-09 PROCEDURE — 72131 CT LUMBAR SPINE W/O DYE: CPT | Mod: 26

## 2020-12-23 ENCOUNTER — LABORATORY RESULT (OUTPATIENT)
Age: 75
End: 2020-12-23

## 2020-12-23 LAB
PSA FREE FLD-MCNC: 32 %
PSA FREE SERPL-MCNC: 0.08 NG/ML
PSA SERPL-MCNC: 0.24 NG/ML

## 2020-12-24 LAB — ESTRADIOL SERPL-MCNC: 27 PG/ML

## 2020-12-29 ENCOUNTER — NON-APPOINTMENT (OUTPATIENT)
Age: 75
End: 2020-12-29

## 2020-12-30 ENCOUNTER — APPOINTMENT (OUTPATIENT)
Dept: UROLOGY | Facility: CLINIC | Age: 75
End: 2020-12-30

## 2021-01-08 ENCOUNTER — NON-APPOINTMENT (OUTPATIENT)
Age: 76
End: 2021-01-08

## 2021-01-08 LAB
% FREE TESTOSTERONE - ESO: 1.4 %
FREE TESTOSTERONE - ESO: 74 PG/ML
SHBG-ESOTERIX: 57.2 NMOL/L
TESTOSTERONE SERUM - ESO: 529 NG/DL

## 2021-01-22 ENCOUNTER — LABORATORY RESULT (OUTPATIENT)
Age: 76
End: 2021-01-22

## 2021-01-28 LAB
ESTRADIOL SERPL-MCNC: 12 PG/ML
TESTOST BND SERPL-MCNC: 4.4 PG/ML
TESTOST BND SERPL-MCNC: 9.02 NG/DL
TESTOST SERPL-MCNC: 316.9 NG/DL
TESTOSTERONE BIOAVAILABLE: 60 NG/DL
TESTOSTERONE TOTAL S: 376 NG/DL

## 2021-02-02 ENCOUNTER — NON-APPOINTMENT (OUTPATIENT)
Age: 76
End: 2021-02-02

## 2021-02-02 LAB
% FREE TESTOSTERONE - ESO: 1.1 %
FREE TESTOSTERONE - ESO: 45 PG/ML
SHBG-ESOTERIX: 50 NMOL/L
TESTOSTERONE SERUM - ESO: 406 NG/DL

## 2021-02-19 LAB — ESTRADIOL SERPL-MCNC: 13 PG/ML

## 2021-02-22 LAB — SHBG SERPL-SCNC: 49.9 NMOL/L

## 2021-02-25 ENCOUNTER — NON-APPOINTMENT (OUTPATIENT)
Age: 76
End: 2021-02-25

## 2021-02-25 LAB
% FREE TESTOSTERONE - ESO: 0.9 %
FREE TESTOSTERONE - ESO: 27 PG/ML
SHBG-ESOTERIX: 57.1 NMOL/L
TESTOST BND SERPL-MCNC: 3 PG/ML
TESTOST BND SERPL-MCNC: 6.23 NG/DL
TESTOST SERPL-MCNC: 280.5 NG/DL
TESTOSTERONE BIOAVAILABLE: 42 NG/DL
TESTOSTERONE SERUM - ESO: 298 NG/DL
TESTOSTERONE TOTAL S: 283 NG/DL

## 2021-03-04 ENCOUNTER — APPOINTMENT (OUTPATIENT)
Dept: UROLOGY | Facility: CLINIC | Age: 76
End: 2021-03-04
Payer: MEDICARE

## 2021-03-04 VITALS
HEART RATE: 90 BPM | TEMPERATURE: 98 F | SYSTOLIC BLOOD PRESSURE: 150 MMHG | HEIGHT: 70 IN | WEIGHT: 256 LBS | DIASTOLIC BLOOD PRESSURE: 90 MMHG | BODY MASS INDEX: 36.65 KG/M2

## 2021-03-04 DIAGNOSIS — A63.0 ANOGENITAL (VENEREAL) WARTS: ICD-10-CM

## 2021-03-04 DIAGNOSIS — R29.898 OTHER SYMPTOMS AND SIGNS INVOLVING THE MUSCULOSKELETAL SYSTEM: ICD-10-CM

## 2021-03-04 PROCEDURE — 99214 OFFICE O/P EST MOD 30 MIN: CPT | Mod: 25

## 2021-03-04 PROCEDURE — 11980 IMPLANT HORMONE PELLET(S): CPT

## 2021-03-04 NOTE — LETTER BODY
[Dear  ___] : Dear  [unfilled], [Courtesy Letter:] : I had the pleasure of seeing your patient, [unfilled], in my office today. [Please see my note below.] : Please see my note below. [Sincerely,] : Sincerely, [FreeTextEntry2] : Aamir Woods MD\par [address] [place] [address]\[place] [address] \par

## 2021-03-04 NOTE — ASSESSMENT
[FreeTextEntry1] : labs now low enough by tracking. They weren't really bottomed out in February but it is three weeks later and if we look at the change over time from January to February 2 now especially given his symptoms he mayra for his next dose.\par he wants to know if he can get a higher doseSo we will draw peak levels and see if he can go back up above six. As far as the condyloma, did not see any he knows to keep a watch out and if he see some still let me know. Please note he was initially sent to me in addition for the ED for lots which was managed by Dr. Chaudhary. Dr. Chaudhary is no longer on [place] and he tells me that his voiding dysfunction is stable it's not something he feels he needs to pursue at this point in time

## 2021-03-04 NOTE — PHYSICAL EXAM
[Abdomen Soft] : soft [Abdomen Tenderness] : non-tender [Costovertebral Angle Tenderness] : no ~M costovertebral angle tenderness [Urethral Meatus] : meatus normal [Testes Tenderness] : no tenderness of the testes [Heart Rate And Rhythm] : Heart rate and rhythm were normal [] : no respiratory distress [Respiration, Rhythm And Depth] : normal respiratory rhythm and effort [Exaggerated Use Of Accessory Muscles For Inspiration] : no accessory muscle use [Auscultation Breath Sounds / Voice Sounds] : lungs were clear to auscultation bilaterally [Oriented To Time, Place, And Person] : oriented to person, place, and time [Affect] : the affect was normal [Mood] : the mood was normal [Not Anxious] : not anxious [Normal Station and Gait] : the gait and station were normal for the patient's age [FreeTextEntry1] : right arm nerve damage

## 2021-03-04 NOTE — HISTORY OF PRESENT ILLNESS
[Currently Experiencing ___] :  [unfilled] [Erectile Dysfunction] : Erectile Dysfunction [Fatigue] : fatigue [FreeTextEntry1] : Jose Roberto is a 75-year-old male we been following for quite some time for testosterone deficiency. He’s been back on Testopel since the pandemic lightened up enough and we were able to get product. His blood test back in [facility] was still too high he had bloods done on February 18 and is here to review them if possible get his next dose. He tells me he is feeling low his libido was down and he thinks the tri-mix is not working as well possibly because he’s too low. \par \par He is also on a self injection program using is at 0.15 ML of our formula number 16, the most concentrated formula that [employer] makes. It doesn’t work as well as he would like but if he goes to a higher dose he just gets too dizzy to have sex. He wanted to know his options other than self injection\par \par Finally he has a history of condyloma and he’s to be checked to see if any have occurred.\par

## 2021-03-04 NOTE — LETTER HEADER
[FreeTextEntry3] : Mohan Hernández M.D.\par Director of Urology\par [facility]\par [address] [place] [address], [address]\[place] [address]

## 2021-03-19 ENCOUNTER — APPOINTMENT (OUTPATIENT)
Dept: UROLOGY | Facility: CLINIC | Age: 76
End: 2021-03-19
Payer: MEDICARE

## 2021-03-19 PROCEDURE — 99215 OFFICE O/P EST HI 40 MIN: CPT

## 2021-03-19 NOTE — ASSESSMENT
[FreeTextEntry1] : Jose Roberto is a 75-year-old male we been following for quite some time for testosterone deficiency by Dr apodaca. \par \par  is feeling low his libido was down and he thinks the tri-mix is not working as well possibly because he’s too low. \par \par He is also on a self injection program using is at 0.15 ML of our formula number 16, the most concentrated formula that men [employer] makes. It doesn’t work as well as he would like but if he goes to a higher dose he just gets too dizzy to have sex. He wanted to know his options other than self injection\par \par Finally he has a history of condyloma and he’s to be checked to see if any have occurred.\par \par \par Patient is currently experiencing Low testosterone, erectile dysfunction and fatigue. \par \par ? semi rigid given poor right hand function. Alternatively it might be position so that the left hand could work the pump\par \par vacuum device failed also

## 2021-04-22 ENCOUNTER — OUTPATIENT (OUTPATIENT)
Dept: OUTPATIENT SERVICES | Facility: HOSPITAL | Age: 76
LOS: 1 days | Discharge: HOME | End: 2021-04-22
Payer: MEDICARE

## 2021-04-22 ENCOUNTER — RESULT REVIEW (OUTPATIENT)
Age: 76
End: 2021-04-22

## 2021-04-22 VITALS
HEIGHT: 71 IN | RESPIRATION RATE: 16 BRPM | SYSTOLIC BLOOD PRESSURE: 142 MMHG | WEIGHT: 257.94 LBS | OXYGEN SATURATION: 97 % | DIASTOLIC BLOOD PRESSURE: 86 MMHG | TEMPERATURE: 97 F | HEART RATE: 98 BPM

## 2021-04-22 DIAGNOSIS — N52.01 ERECTILE DYSFUNCTION DUE TO ARTERIAL INSUFFICIENCY: ICD-10-CM

## 2021-04-22 DIAGNOSIS — Z95.810 PRESENCE OF AUTOMATIC (IMPLANTABLE) CARDIAC DEFIBRILLATOR: Chronic | ICD-10-CM

## 2021-04-22 DIAGNOSIS — I25.10 ATHEROSCLEROTIC HEART DISEASE OF NATIVE CORONARY ARTERY WITHOUT ANGINA PECTORIS: Chronic | ICD-10-CM

## 2021-04-22 DIAGNOSIS — Z95.1 PRESENCE OF AORTOCORONARY BYPASS GRAFT: Chronic | ICD-10-CM

## 2021-04-22 DIAGNOSIS — Z01.818 ENCOUNTER FOR OTHER PREPROCEDURAL EXAMINATION: ICD-10-CM

## 2021-04-22 DIAGNOSIS — Z90.89 ACQUIRED ABSENCE OF OTHER ORGANS: Chronic | ICD-10-CM

## 2021-04-22 LAB
ALBUMIN SERPL ELPH-MCNC: 4.1 G/DL — SIGNIFICANT CHANGE UP (ref 3.5–5.2)
ALP SERPL-CCNC: 92 U/L — SIGNIFICANT CHANGE UP (ref 30–115)
ALT FLD-CCNC: 33 U/L — SIGNIFICANT CHANGE UP (ref 0–41)
ANION GAP SERPL CALC-SCNC: 10 MMOL/L — SIGNIFICANT CHANGE UP (ref 7–14)
APPEARANCE UR: CLEAR — SIGNIFICANT CHANGE UP
APTT BLD: 32.9 SEC — SIGNIFICANT CHANGE UP (ref 27–39.2)
AST SERPL-CCNC: 25 U/L — SIGNIFICANT CHANGE UP (ref 0–41)
BASOPHILS # BLD AUTO: 0.1 K/UL — SIGNIFICANT CHANGE UP (ref 0–0.2)
BASOPHILS NFR BLD AUTO: 1.2 % — HIGH (ref 0–1)
BILIRUB SERPL-MCNC: 0.4 MG/DL — SIGNIFICANT CHANGE UP (ref 0.2–1.2)
BILIRUB UR-MCNC: NEGATIVE — SIGNIFICANT CHANGE UP
BUN SERPL-MCNC: 12 MG/DL — SIGNIFICANT CHANGE UP (ref 10–20)
CALCIUM SERPL-MCNC: 9.5 MG/DL — SIGNIFICANT CHANGE UP (ref 8.5–10.1)
CHLORIDE SERPL-SCNC: 104 MMOL/L — SIGNIFICANT CHANGE UP (ref 98–110)
CO2 SERPL-SCNC: 24 MMOL/L — SIGNIFICANT CHANGE UP (ref 17–32)
COLOR SPEC: SIGNIFICANT CHANGE UP
CREAT SERPL-MCNC: 0.8 MG/DL — SIGNIFICANT CHANGE UP (ref 0.7–1.5)
DIFF PNL FLD: NEGATIVE — SIGNIFICANT CHANGE UP
EOSINOPHIL # BLD AUTO: 0.4 K/UL — SIGNIFICANT CHANGE UP (ref 0–0.7)
EOSINOPHIL NFR BLD AUTO: 4.7 % — SIGNIFICANT CHANGE UP (ref 0–8)
GLUCOSE SERPL-MCNC: 122 MG/DL — HIGH (ref 70–99)
GLUCOSE UR QL: NEGATIVE — SIGNIFICANT CHANGE UP
HCT VFR BLD CALC: 46.2 % — SIGNIFICANT CHANGE UP (ref 42–52)
HGB BLD-MCNC: 15.2 G/DL — SIGNIFICANT CHANGE UP (ref 14–18)
IMM GRANULOCYTES NFR BLD AUTO: 0.4 % — HIGH (ref 0.1–0.3)
INR BLD: 1.03 RATIO — SIGNIFICANT CHANGE UP (ref 0.65–1.3)
KETONES UR-MCNC: NEGATIVE — SIGNIFICANT CHANGE UP
LEUKOCYTE ESTERASE UR-ACNC: NEGATIVE — SIGNIFICANT CHANGE UP
LYMPHOCYTES # BLD AUTO: 1.01 K/UL — LOW (ref 1.2–3.4)
LYMPHOCYTES # BLD AUTO: 12 % — LOW (ref 20.5–51.1)
MCHC RBC-ENTMCNC: 31.9 PG — HIGH (ref 27–31)
MCHC RBC-ENTMCNC: 32.9 G/DL — SIGNIFICANT CHANGE UP (ref 32–37)
MCV RBC AUTO: 97.1 FL — HIGH (ref 80–94)
MONOCYTES # BLD AUTO: 0.85 K/UL — HIGH (ref 0.1–0.6)
MONOCYTES NFR BLD AUTO: 10.1 % — HIGH (ref 1.7–9.3)
NEUTROPHILS # BLD AUTO: 6.06 K/UL — SIGNIFICANT CHANGE UP (ref 1.4–6.5)
NEUTROPHILS NFR BLD AUTO: 71.6 % — SIGNIFICANT CHANGE UP (ref 42.2–75.2)
NITRITE UR-MCNC: NEGATIVE — SIGNIFICANT CHANGE UP
NRBC # BLD: 0 /100 WBCS — SIGNIFICANT CHANGE UP (ref 0–0)
PH UR: 6 — SIGNIFICANT CHANGE UP (ref 5–8)
PLATELET # BLD AUTO: 209 K/UL — SIGNIFICANT CHANGE UP (ref 130–400)
POTASSIUM SERPL-MCNC: 4.5 MMOL/L — SIGNIFICANT CHANGE UP (ref 3.5–5)
POTASSIUM SERPL-SCNC: 4.5 MMOL/L — SIGNIFICANT CHANGE UP (ref 3.5–5)
PROT SERPL-MCNC: 6.6 G/DL — SIGNIFICANT CHANGE UP (ref 6–8)
PROT UR-MCNC: NEGATIVE — SIGNIFICANT CHANGE UP
PROTHROM AB SERPL-ACNC: 11.8 SEC — SIGNIFICANT CHANGE UP (ref 9.95–12.87)
RBC # BLD: 4.76 M/UL — SIGNIFICANT CHANGE UP (ref 4.7–6.1)
RBC # FLD: 13.2 % — SIGNIFICANT CHANGE UP (ref 11.5–14.5)
SODIUM SERPL-SCNC: 138 MMOL/L — SIGNIFICANT CHANGE UP (ref 135–146)
SP GR SPEC: 1.02 — SIGNIFICANT CHANGE UP (ref 1.01–1.03)
UROBILINOGEN FLD QL: SIGNIFICANT CHANGE UP
WBC # BLD: 8.45 K/UL — SIGNIFICANT CHANGE UP (ref 4.8–10.8)
WBC # FLD AUTO: 8.45 K/UL — SIGNIFICANT CHANGE UP (ref 4.8–10.8)

## 2021-04-22 PROCEDURE — 93010 ELECTROCARDIOGRAM REPORT: CPT

## 2021-04-22 PROCEDURE — 71046 X-RAY EXAM CHEST 2 VIEWS: CPT | Mod: 26

## 2021-04-22 RX ORDER — ASPIRIN/CALCIUM CARB/MAGNESIUM 324 MG
81 TABLET ORAL
Qty: 0 | Refills: 0 | DISCHARGE

## 2021-04-22 RX ORDER — RABEPRAZOLE 20 MG/1
0 TABLET, DELAYED RELEASE ORAL
Qty: 0 | Refills: 0 | DISCHARGE

## 2021-04-22 RX ORDER — ATORVASTATIN CALCIUM 80 MG/1
0 TABLET, FILM COATED ORAL
Qty: 0 | Refills: 0 | DISCHARGE

## 2021-04-22 NOTE — H&P PST ADULT - NSICDXPASTMEDICALHX_GEN_ALL_CORE_FT
PAST MEDICAL HISTORY:  AICD (automatic cardioverter/defibrillator) present     Erectile dysfunction     GERD (gastroesophageal reflux disease)     High cholesterol     HTN (hypertension)     Myocardial infarct 10 years ago

## 2021-04-22 NOTE — H&P PST ADULT - HISTORY OF PRESENT ILLNESS
74 yo male presents w/ hx ED.  pt is scheduled for Penile Prosthesis;  denies chest pain, palpitations, shortness of breath, dyspnea, or dysuria. exercise tolerance: 2 blocks/ flights of stairs w/o sob  pt denies any known exposure to COVID-19, denies any S&S  pt instructed re: self quarantine guidelines    Anesthesia Alert  NO--Difficult Airway  NO--History of neck surgery or radiation  NO--Limited ROM of neck  NO--History of Malignant hyperthermia  NO--No personal or family history of Pseudocholinesterase deficiency.  NO--Prior Anesthesia Complication  NO--Latex Allergy  NO--Loose teeth  NO--History of Rheumatoid Arthritis  NO--CHICO  NO--Other_____

## 2021-04-22 NOTE — H&P PST ADULT - NSICDXPASTSURGICALHX_GEN_ALL_CORE_FT
PAST SURGICAL HISTORY:  AICD (automatic cardioverter/defibrillator) present     CAD (coronary artery disease) 3 stents    H/O uvulectomy     S/P T&A (status post tonsillectomy and adenoidectomy)

## 2021-04-28 ENCOUNTER — NON-APPOINTMENT (OUTPATIENT)
Age: 76
End: 2021-04-28

## 2021-05-10 ENCOUNTER — OUTPATIENT (OUTPATIENT)
Dept: OUTPATIENT SERVICES | Facility: HOSPITAL | Age: 76
LOS: 1 days | Discharge: HOME | End: 2021-05-10

## 2021-05-10 ENCOUNTER — LABORATORY RESULT (OUTPATIENT)
Age: 76
End: 2021-05-10

## 2021-05-10 DIAGNOSIS — Z90.89 ACQUIRED ABSENCE OF OTHER ORGANS: Chronic | ICD-10-CM

## 2021-05-10 DIAGNOSIS — Z11.59 ENCOUNTER FOR SCREENING FOR OTHER VIRAL DISEASES: ICD-10-CM

## 2021-05-10 DIAGNOSIS — I25.10 ATHEROSCLEROTIC HEART DISEASE OF NATIVE CORONARY ARTERY WITHOUT ANGINA PECTORIS: Chronic | ICD-10-CM

## 2021-05-10 DIAGNOSIS — Z95.810 PRESENCE OF AUTOMATIC (IMPLANTABLE) CARDIAC DEFIBRILLATOR: Chronic | ICD-10-CM

## 2021-05-10 PROBLEM — N52.9 MALE ERECTILE DYSFUNCTION, UNSPECIFIED: Chronic | Status: ACTIVE | Noted: 2021-04-22

## 2021-05-13 ENCOUNTER — APPOINTMENT (OUTPATIENT)
Dept: UROLOGY | Facility: HOSPITAL | Age: 76
End: 2021-05-13

## 2021-05-13 ENCOUNTER — OUTPATIENT (OUTPATIENT)
Dept: OUTPATIENT SERVICES | Facility: HOSPITAL | Age: 76
LOS: 1 days | Discharge: HOME | End: 2021-05-13
Payer: MEDICARE

## 2021-05-13 VITALS
RESPIRATION RATE: 20 BRPM | HEART RATE: 85 BPM | DIASTOLIC BLOOD PRESSURE: 79 MMHG | WEIGHT: 248.9 LBS | OXYGEN SATURATION: 95 % | HEIGHT: 70 IN | SYSTOLIC BLOOD PRESSURE: 135 MMHG | TEMPERATURE: 98 F

## 2021-05-13 VITALS
SYSTOLIC BLOOD PRESSURE: 137 MMHG | OXYGEN SATURATION: 97 % | HEART RATE: 67 BPM | RESPIRATION RATE: 14 BRPM | DIASTOLIC BLOOD PRESSURE: 81 MMHG

## 2021-05-13 DIAGNOSIS — I25.10 ATHEROSCLEROTIC HEART DISEASE OF NATIVE CORONARY ARTERY WITHOUT ANGINA PECTORIS: Chronic | ICD-10-CM

## 2021-05-13 DIAGNOSIS — Z90.89 ACQUIRED ABSENCE OF OTHER ORGANS: Chronic | ICD-10-CM

## 2021-05-13 DIAGNOSIS — Z95.810 PRESENCE OF AUTOMATIC (IMPLANTABLE) CARDIAC DEFIBRILLATOR: Chronic | ICD-10-CM

## 2021-05-13 PROCEDURE — 54405 INSERT MULTI-COMP PENIS PROS: CPT

## 2021-05-13 PROCEDURE — 54405 INSERT MULTI-COMP PENIS PROS: CPT | Mod: AS

## 2021-05-13 RX ORDER — CELECOXIB 200 MG/1
200 CAPSULE ORAL ONCE
Refills: 0 | Status: COMPLETED | OUTPATIENT
Start: 2021-05-13 | End: 2021-05-13

## 2021-05-13 RX ORDER — ACETAMINOPHEN 500 MG
2 TABLET ORAL
Qty: 112 | Refills: 0
Start: 2021-05-13 | End: 2021-05-26

## 2021-05-13 RX ORDER — VANCOMYCIN HCL 1 G
1000 VIAL (EA) INTRAVENOUS ONCE
Refills: 0 | Status: COMPLETED | OUTPATIENT
Start: 2021-05-13 | End: 2021-05-13

## 2021-05-13 RX ORDER — ONDANSETRON 8 MG/1
4 TABLET, FILM COATED ORAL ONCE
Refills: 0 | Status: DISCONTINUED | OUTPATIENT
Start: 2021-05-13 | End: 2021-05-13

## 2021-05-13 RX ORDER — HYDROMORPHONE HYDROCHLORIDE 2 MG/ML
1 INJECTION INTRAMUSCULAR; INTRAVENOUS; SUBCUTANEOUS
Refills: 0 | Status: DISCONTINUED | OUTPATIENT
Start: 2021-05-13 | End: 2021-05-13

## 2021-05-13 RX ORDER — DOCUSATE SODIUM 100 MG
1 CAPSULE ORAL
Qty: 42 | Refills: 0
Start: 2021-05-13 | End: 2021-05-26

## 2021-05-13 RX ORDER — OXYCODONE AND ACETAMINOPHEN 5; 325 MG/1; MG/1
2 TABLET ORAL ONCE
Refills: 0 | Status: DISCONTINUED | OUTPATIENT
Start: 2021-05-13 | End: 2021-05-13

## 2021-05-13 RX ORDER — GABAPENTIN 400 MG/1
300 CAPSULE ORAL ONCE
Refills: 0 | Status: COMPLETED | OUTPATIENT
Start: 2021-05-13 | End: 2021-05-13

## 2021-05-13 RX ORDER — ACETAMINOPHEN 500 MG
975 TABLET ORAL ONCE
Refills: 0 | Status: COMPLETED | OUTPATIENT
Start: 2021-05-13 | End: 2021-05-13

## 2021-05-13 RX ORDER — GABAPENTIN 400 MG/1
1 CAPSULE ORAL
Qty: 21 | Refills: 0
Start: 2021-05-13 | End: 2021-05-19

## 2021-05-13 RX ORDER — MEPERIDINE HYDROCHLORIDE 50 MG/ML
12.5 INJECTION INTRAMUSCULAR; INTRAVENOUS; SUBCUTANEOUS
Refills: 0 | Status: DISCONTINUED | OUTPATIENT
Start: 2021-05-13 | End: 2021-05-13

## 2021-05-13 RX ORDER — OXYCODONE AND ACETAMINOPHEN 5; 325 MG/1; MG/1
1 TABLET ORAL ONCE
Refills: 0 | Status: DISCONTINUED | OUTPATIENT
Start: 2021-05-13 | End: 2021-05-13

## 2021-05-13 RX ORDER — OXYCODONE HYDROCHLORIDE 5 MG/1
1 TABLET ORAL
Qty: 12 | Refills: 0
Start: 2021-05-13 | End: 2021-05-15

## 2021-05-13 RX ORDER — HYDROMORPHONE HYDROCHLORIDE 2 MG/ML
0.5 INJECTION INTRAMUSCULAR; INTRAVENOUS; SUBCUTANEOUS
Refills: 0 | Status: DISCONTINUED | OUTPATIENT
Start: 2021-05-13 | End: 2021-05-13

## 2021-05-13 RX ORDER — SODIUM CHLORIDE 9 MG/ML
1000 INJECTION, SOLUTION INTRAVENOUS
Refills: 0 | Status: DISCONTINUED | OUTPATIENT
Start: 2021-05-13 | End: 2021-05-13

## 2021-05-13 RX ORDER — IBUPROFEN 200 MG
1 TABLET ORAL
Qty: 56 | Refills: 0
Start: 2021-05-13 | End: 2021-05-26

## 2021-05-13 RX ADMIN — Medication 250 MILLIGRAM(S): at 13:49

## 2021-05-13 RX ADMIN — CELECOXIB 200 MILLIGRAM(S): 200 CAPSULE ORAL at 13:50

## 2021-05-13 RX ADMIN — GABAPENTIN 300 MILLIGRAM(S): 400 CAPSULE ORAL at 13:50

## 2021-05-13 RX ADMIN — Medication 975 MILLIGRAM(S): at 13:50
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
## 2021-05-13 NOTE — BRIEF OPERATIVE NOTE - OPERATION/FINDINGS
unremarkable penile implant   ams 700 cx 21 cm without RTE and 100ml reservoir in the left space of retzius

## 2021-05-13 NOTE — ASU PATIENT PROFILE, ADULT - PMH
AICD (automatic cardioverter/defibrillator) present    Back pain    Erectile dysfunction    GERD (gastroesophageal reflux disease)    High cholesterol    HTN (hypertension)    Myocardial infarct  10 years ago

## 2021-05-13 NOTE — BRIEF OPERATIVE NOTE - NSICDXBRIEFPREOP_GEN_ALL_CORE_FT
PRE-OP DIAGNOSIS:  Erectile dysfunction due to arterial insufficiency 13-May-2021 17:01:14  Elias Canales

## 2021-05-13 NOTE — BRIEF OPERATIVE NOTE - NSICDXBRIEFPOSTOP_GEN_ALL_CORE_FT
POST-OP DIAGNOSIS:  Erectile dysfunction due to arterial insufficiency 13-May-2021 17:01:24  Elias Canales

## 2021-05-13 NOTE — BRIEF OPERATIVE NOTE - COMMENTS
" AUREA Resendiz PA-C performed the duties of first assit during the above listed surgery, which includes, but is not limited to, retraction, suctioning and suturing. There was no competent resident available fro the case. "

## 2021-05-13 NOTE — ASU PATIENT PROFILE, ADULT - PSH
AICD (automatic cardioverter/defibrillator) present    CAD (coronary artery disease)  3 stents  H/O uvulectomy    S/P T&A (status post tonsillectomy and adenoidectomy)

## 2021-05-13 NOTE — CHART NOTE - NSCHARTNOTEFT_GEN_A_CORE
PACU ANESTHESIA ADMISSION NOTE      Procedure: Insertion of  CX penile prosthesis      Post op diagnosis:  Erectile dysfunction due to arterial insufficiency        __x__  Patent Airway    __x__  Full return of protective reflexes    __x__  Full recovery from anesthesia / back to baseline     Vitals:   T: 97.8          R: 12                 BP: 129/85                 Sat:   97%                P: 67      Mental Status:  __x__ Awake   _____ Alert   _____ Drowsy   _____ Sedated    Nausea/Vomiting:  ____ NO  ______Yes,   See Post - Op Orders          Pain Scale (0-10):  _____    Treatment: ____ None    ___x_ See Post - Op/PCA Orders    Post - Operative Fluids:   ____ Oral   __x__ See Post - Op Orders    Plan: Discharge:   __x__Home       _____Floor     _____Critical Care    _____  Other:_________________    Comments: Pt tolerated procedure well, no anesthesia related complications. Care of pt endorsed to PACU, report given to PACU RN. Discharge when criteria are met.

## 2021-05-14 ENCOUNTER — APPOINTMENT (OUTPATIENT)
Dept: UROLOGY | Facility: CLINIC | Age: 76
End: 2021-05-14
Payer: MEDICARE

## 2021-05-14 VITALS
BODY MASS INDEX: 36.65 KG/M2 | DIASTOLIC BLOOD PRESSURE: 83 MMHG | WEIGHT: 256 LBS | HEART RATE: 85 BPM | HEIGHT: 70 IN | SYSTOLIC BLOOD PRESSURE: 142 MMHG

## 2021-05-14 PROBLEM — M54.9 DORSALGIA, UNSPECIFIED: Chronic | Status: ACTIVE | Noted: 2021-05-13

## 2021-05-14 PROCEDURE — 99024 POSTOP FOLLOW-UP VISIT: CPT

## 2021-05-14 RX ORDER — CELECOXIB 200 MG/1
200 CAPSULE ORAL DAILY
Qty: 7 | Refills: 0 | Status: ACTIVE | COMMUNITY
Start: 2021-05-14 | End: 1900-01-01

## 2021-05-21 ENCOUNTER — APPOINTMENT (OUTPATIENT)
Dept: UROLOGY | Facility: CLINIC | Age: 76
End: 2021-05-21
Payer: MEDICARE

## 2021-05-21 PROCEDURE — 99213 OFFICE O/P EST LOW 20 MIN: CPT | Mod: 24

## 2021-05-21 NOTE — HISTORY OF PRESENT ILLNESS
[FreeTextEntry1] : IPP last week\par presents to adjust and manipulate device \par today the device was deflated\par he does have edema to the scrotum\par pain was present and improved after it was deflated\par no fevers or drainage of pus

## 2021-05-25 DIAGNOSIS — Z79.82 LONG TERM (CURRENT) USE OF ASPIRIN: ICD-10-CM

## 2021-05-25 DIAGNOSIS — N52.8 OTHER MALE ERECTILE DYSFUNCTION: ICD-10-CM

## 2021-05-25 DIAGNOSIS — Z95.810 PRESENCE OF AUTOMATIC (IMPLANTABLE) CARDIAC DEFIBRILLATOR: ICD-10-CM

## 2021-05-25 DIAGNOSIS — F17.200 NICOTINE DEPENDENCE, UNSPECIFIED, UNCOMPLICATED: ICD-10-CM

## 2021-05-25 DIAGNOSIS — E78.00 PURE HYPERCHOLESTEROLEMIA, UNSPECIFIED: ICD-10-CM

## 2021-05-25 DIAGNOSIS — K21.9 GASTRO-ESOPHAGEAL REFLUX DISEASE WITHOUT ESOPHAGITIS: ICD-10-CM

## 2021-05-25 DIAGNOSIS — I10 ESSENTIAL (PRIMARY) HYPERTENSION: ICD-10-CM

## 2021-05-25 DIAGNOSIS — Z95.5 PRESENCE OF CORONARY ANGIOPLASTY IMPLANT AND GRAFT: ICD-10-CM

## 2021-05-25 DIAGNOSIS — I25.10 ATHEROSCLEROTIC HEART DISEASE OF NATIVE CORONARY ARTERY WITHOUT ANGINA PECTORIS: ICD-10-CM

## 2021-05-25 DIAGNOSIS — Z95.1 PRESENCE OF AORTOCORONARY BYPASS GRAFT: ICD-10-CM

## 2021-05-25 DIAGNOSIS — I25.2 OLD MYOCARDIAL INFARCTION: ICD-10-CM

## 2021-05-30 NOTE — PHYSICAL EXAM
[General Appearance - In No Acute Distress] : no acute distress [Abdomen Soft] : soft [Abdomen Tenderness] : non-tender [Skin Color & Pigmentation] : normal skin color and pigmentation [] : no respiratory distress [Oriented To Time, Place, And Person] : oriented to person, place, and time [No Focal Deficits] : no focal deficits [FreeTextEntry1] : a

## 2021-05-30 NOTE — ASSESSMENT
[FreeTextEntry1] : Patient Thomas Catheter and GIULIANO drain removed without difficulty. \par Bandages were removed this visit. \par Patient is instructed to place Bacitracin over GIULIANO drain incision site and Suprapubic incision site.  \par If unable to urinate patient is to present to ER or Office if open. \par Patient instructed to pull implant down for 60 seconds every day. Patient shown where the implant device is located and shown how to properly pull it down.\par Patient to follow up 1 week for deflation of the device. \par Post op medication resent to patients pharmacy. \par

## 2021-05-30 NOTE — HISTORY OF PRESENT ILLNESS
[FreeTextEntry1] : CHRISTINA MUSTAFA  is a 75 year male presenting post op day 1 IPP placement. \par Patient presents today to have GIULIANO drain removed and Thomas Catheter removed. \par Patient has brought in GIULIANO drainage log which reads as follows:\par 8:53 pm- 10 DR 1 oz\par 1:30 am- 4 DR 1/2 oz\par 8:38 am- 6 DR 4/3 oz\par 10:40 am- approximately 15 mL in the drain. \par Patient Thomas catheter is present with clear yellow urine. No blood clots present. \par Patient states that he never got the post op medication from his pharmacy. \par Patient reports 6/10 pain stating that he feels well. Patient states that he does not want to take pain medication. \par \par

## 2021-06-18 ENCOUNTER — APPOINTMENT (OUTPATIENT)
Dept: UROLOGY | Facility: CLINIC | Age: 76
End: 2021-06-18
Payer: MEDICARE

## 2021-06-18 VITALS — BODY MASS INDEX: 35.22 KG/M2 | HEIGHT: 70 IN | WEIGHT: 246 LBS | TEMPERATURE: 97.8 F

## 2021-06-18 DIAGNOSIS — Z45.89 ENCOUNTER FOR ADJUSTMENT AND MANAGEMENT OF OTHER IMPLANTED DEVICES: ICD-10-CM

## 2021-06-18 PROCEDURE — 99213 OFFICE O/P EST LOW 20 MIN: CPT | Mod: 24

## 2021-06-18 NOTE — HISTORY OF PRESENT ILLNESS
[FreeTextEntry1] : IPP last month\par presents to adjust and manipulate device \par today manipulation and adjustment of device -- he was able to inflate and deflate easily\par edema to the scrotum has resolved \par no fevers or drainage of pus

## 2021-06-29 LAB
% FREE TESTOSTERONE - ESO: 1.3 %
ALBUMIN SERPL ELPH-MCNC: 4.6 G/DL
ALP BLD-CCNC: 83 U/L
ALT SERPL-CCNC: 45 U/L
AST SERPL-CCNC: 36 U/L
BASOPHILS # BLD AUTO: 0.09 K/UL
BASOPHILS NFR BLD AUTO: 1.3 %
BILIRUB DIRECT SERPL-MCNC: <0.2 MG/DL
BILIRUB INDIRECT SERPL-MCNC: >0.4 MG/DL
BILIRUB SERPL-MCNC: 0.6 MG/DL
EOSINOPHIL # BLD AUTO: 0.21 K/UL
EOSINOPHIL NFR BLD AUTO: 3.1 %
ESTRADIOL SERPL-MCNC: 29 PG/ML
FREE TESTOSTERONE - ESO: 115 PG/ML
HCT VFR BLD CALC: 43.8 %
HGB BLD-MCNC: 14.4 G/DL
IMM GRANULOCYTES NFR BLD AUTO: 0.4 %
LYMPHOCYTES # BLD AUTO: 1.22 K/UL
LYMPHOCYTES NFR BLD AUTO: 18.1 %
MAN DIFF?: NORMAL
MCHC RBC-ENTMCNC: 32.4 PG
MCHC RBC-ENTMCNC: 32.9 G/DL
MCV RBC AUTO: 98.6 FL
MONOCYTES # BLD AUTO: 0.8 K/UL
MONOCYTES NFR BLD AUTO: 11.9 %
NEUTROPHILS # BLD AUTO: 4.38 K/UL
NEUTROPHILS NFR BLD AUTO: 65.2 %
PLATELET # BLD AUTO: 201 K/UL
PROT SERPL-MCNC: 6.6 G/DL
PSA FREE FLD-MCNC: 30 %
PSA FREE SERPL-MCNC: 0.08 NG/ML
PSA SERPL-MCNC: 0.28 NG/ML
RBC # BLD: 4.44 M/UL
RBC # FLD: 13.6 %
SHBG SERPL-SCNC: 45 NMOL/L
SHBG-ESOTERIX: 57.2 NMOL/L
TESTOSTERONE FREE/WEAKLY BND: NORMAL
TESTOSTERONE SERUM - ESO: 881 NG/DL
TESTOSTERONE TOTAL S: NORMAL
TESTOSTERONE, % FREE/WEAKLY BND: NORMAL
WBC # FLD AUTO: 6.73 K/UL

## 2021-07-01 LAB
ESTRADIOL SERPL-MCNC: 22 PG/ML
SHBG SERPL-SCNC: 59.8 NMOL/L

## 2021-07-08 ENCOUNTER — APPOINTMENT (OUTPATIENT)
Dept: UROLOGY | Facility: CLINIC | Age: 76
End: 2021-07-08

## 2021-07-08 VITALS
DIASTOLIC BLOOD PRESSURE: 78 MMHG | BODY MASS INDEX: 35.93 KG/M2 | WEIGHT: 251 LBS | HEART RATE: 81 BPM | HEIGHT: 70 IN | SYSTOLIC BLOOD PRESSURE: 125 MMHG

## 2021-07-09 ENCOUNTER — NON-APPOINTMENT (OUTPATIENT)
Age: 76
End: 2021-07-09

## 2021-07-09 LAB — ESTRADIOL SERPL-MCNC: 24 PG/ML

## 2021-07-12 ENCOUNTER — APPOINTMENT (OUTPATIENT)
Dept: UROLOGY | Facility: CLINIC | Age: 76
End: 2021-07-12
Payer: MEDICARE

## 2021-07-12 ENCOUNTER — NON-APPOINTMENT (OUTPATIENT)
Age: 76
End: 2021-07-12

## 2021-07-12 LAB
% FREE TESTOSTERONE - ESO: 0.9 %
FREE TESTOSTERONE - ESO: 47 PG/ML
SHBG-ESOTERIX: 61.5 NMOL/L
TESTOST BND SERPL-MCNC: 4.2 PG/ML
TESTOSTERONE SERUM - ESO: 525 NG/DL
TESTOSTERONE TOTAL S: 332 NG/DL

## 2021-07-12 PROCEDURE — 99213 OFFICE O/P EST LOW 20 MIN: CPT | Mod: 95

## 2021-07-12 NOTE — ASSESSMENT
[FreeTextEntry1] : We ended up straight audio so I could not do a physical exam,\par \par His free testosterone is low, the bioavailable was unable to be run but his free is low enough that it is time for his next dose.  He will be called in sometime this week to get 6 pellets

## 2021-07-12 NOTE — LETTER BODY
[Dear  ___] : Dear  [unfilled], [Courtesy Letter:] : I had the pleasure of seeing your patient, [unfilled], in my office today. [Please see my note below.] : Please see my note below. [Sincerely,] : Sincerely, [FreeTextEntry2] : Aamir Woods MD\par [address] [place] [address]\[place] [address] \par

## 2021-07-12 NOTE — LETTER HEADER
[FreeTextEntry3] : Mohan Hernández M.D.\par Director of Urology\par [facility]\par [address] [place] [address], [address]\[place] [address]  Repair Type: Intermediate

## 2021-07-12 NOTE — HISTORY OF PRESENT ILLNESS
[Home] : at home, [unfilled] , at the time of the visit. [Medical Office: ([facility])___] : at the medical office located in  [Verbal consent obtained from patient] : the patient, [unfilled] [FreeTextEntry3] : he has received, reviewed and agreed to the telemedicine consent  [FreeTextEntry1] : cell [phone]\par He could not make a work neither with [employer] nor with [employer] we ended up dropping down to audio only\par \par Jose Roberto was in last week he was supposed to have blood drawn to see if he is ready for his next dose of Testopel.  We ended up drawing them ourselves when meeting out to see if he is ready for his next dose or not.  He feels exhausted\par  [Currently Experiencing ___] :  [unfilled] [Fatigue] : fatigue

## 2021-07-14 ENCOUNTER — NON-APPOINTMENT (OUTPATIENT)
Age: 76
End: 2021-07-14

## 2021-07-14 LAB
TESTOSTERONE FREE/WEAKLY BND: 36.2 NG/DL
TESTOSTERONE TOTAL S: 326 NG/DL
TESTOSTERONE, % FREE/WEAKLY BND: 11.1 %

## 2021-07-15 ENCOUNTER — APPOINTMENT (OUTPATIENT)
Dept: UROLOGY | Facility: CLINIC | Age: 76
End: 2021-07-15
Payer: MEDICARE

## 2021-07-15 VITALS
HEART RATE: 78 BPM | HEIGHT: 70 IN | DIASTOLIC BLOOD PRESSURE: 79 MMHG | BODY MASS INDEX: 35.07 KG/M2 | SYSTOLIC BLOOD PRESSURE: 140 MMHG | WEIGHT: 245 LBS

## 2021-07-15 PROCEDURE — 99214 OFFICE O/P EST MOD 30 MIN: CPT | Mod: 25

## 2021-07-15 PROCEDURE — 11980 IMPLANT HORMONE PELLET(S): CPT | Mod: 58

## 2021-07-15 RX ORDER — NITROFURANTOIN MACROCRYSTALS 100 MG/1
100 CAPSULE ORAL
Qty: 14 | Refills: 0 | Status: COMPLETED | COMMUNITY
Start: 2021-04-28 | End: 2021-07-15

## 2021-07-15 RX ORDER — PAPAV/PHENTOLAM/ALPROST/WATER 30-1-20/ML
VIAL (ML) INTRACAVERNOSAL
Refills: 0 | Status: COMPLETED | COMMUNITY
End: 2021-07-15

## 2021-07-15 RX ORDER — SULFAMETHOXAZOLE AND TRIMETHOPRIM 800; 160 MG/1; MG/1
800-160 TABLET ORAL
Qty: 14 | Refills: 0 | Status: COMPLETED | COMMUNITY
Start: 2021-05-14 | End: 2021-07-15

## 2021-07-15 NOTE — PHYSICAL EXAM
[Abdomen Soft] : soft [Abdomen Tenderness] : non-tender [Costovertebral Angle Tenderness] : no ~M costovertebral angle tenderness [Urethral Meatus] : meatus normal [Testes Tenderness] : no tenderness of the testes [] : no respiratory distress [Respiration, Rhythm And Depth] : normal respiratory rhythm and effort [Exaggerated Use Of Accessory Muscles For Inspiration] : no accessory muscle use [Auscultation Breath Sounds / Voice Sounds] : lungs were clear to auscultation bilaterally [Normal Station and Gait] : the gait and station were normal for the patient's age [FreeTextEntry1] : Weakness of his arm

## 2021-07-15 NOTE — LETTER BODY
[Dear  ___] : Dear  [unfilled], [Courtesy Letter:] : I had the pleasure of seeing your patient, [unfilled], in my office today. [Please see my note below.] : Please see my note below. [Sincerely,] : Sincerely, [FreeTextEntry2] : Aamir Woods MD\par [address] [place] [address]\[place] [address] \par

## 2021-07-15 NOTE — ASSESSMENT
[FreeTextEntry1] : His testosterone level is now low enough he cannot get more than 6 pellets as if we tried he goes to high he will get them inserted please see that note listed separately blood in 2-1/2 to 3 months, call before he comes in and make sure he is told they are low and follow-up once his bloods are low for the next dose.\par \par As far as his low libido lack of orgasm I would wait and see if his libido and orgasmic ability returns once the pellets start to dissolve and his testosterone is back to normal if not he will call and come in sooner

## 2021-07-15 NOTE — LETTER HEADER
[FreeTextEntry3] : Mohan Hernández M.D.\par Director of Urology\par [facility]\par [address] [place] [address], [address]\[place] [address]

## 2021-07-15 NOTE — HISTORY OF PRESENT ILLNESS
[FreeTextEntry1] : Jose Roberto is a 76-year-old male we have been following for many years.  He was seen last week in the area as his lab values had not yet resulted and they had not drawn the full component.  We ended up drawing blood he is here today to see if he is due for his next dose.\par \par In addition he tells me though he was able to have sex for the first time since his implant was put in and his partner was born and satisfied he really had no libido and was unable to reach orgasm and is wondering if the implant is the cause. [Currently Experiencing ___] :  [unfilled] [Erectile Dysfunction] : Erectile Dysfunction [Fatigue] : fatigue

## 2021-09-20 ENCOUNTER — APPOINTMENT (OUTPATIENT)
Dept: UROLOGY | Facility: CLINIC | Age: 76
End: 2021-09-20
Payer: MEDICARE

## 2021-09-20 VITALS — HEIGHT: 70 IN | BODY MASS INDEX: 34.36 KG/M2 | WEIGHT: 240 LBS

## 2021-09-20 PROCEDURE — 99212 OFFICE O/P EST SF 10 MIN: CPT

## 2021-11-08 ENCOUNTER — LABORATORY RESULT (OUTPATIENT)
Age: 76
End: 2021-11-08

## 2021-11-15 ENCOUNTER — APPOINTMENT (OUTPATIENT)
Dept: UROLOGY | Facility: CLINIC | Age: 76
End: 2021-11-15
Payer: MEDICARE

## 2021-11-15 VITALS
HEART RATE: 94 BPM | DIASTOLIC BLOOD PRESSURE: 93 MMHG | WEIGHT: 249 LBS | BODY MASS INDEX: 35.65 KG/M2 | HEIGHT: 70 IN | SYSTOLIC BLOOD PRESSURE: 159 MMHG

## 2021-11-15 PROCEDURE — 11980 IMPLANT HORMONE PELLET(S): CPT

## 2021-11-15 PROCEDURE — 99214 OFFICE O/P EST MOD 30 MIN: CPT | Mod: 25

## 2021-11-15 NOTE — LETTER BODY
[Dear  ___] : Dear  [unfilled], [Courtesy Letter:] : I had the pleasure of seeing your patient, [unfilled], in my office today. [Please see my note below.] : Please see my note below. [Sincerely,] : Sincerely, [FreeTextEntry2] : Aamir Woods MD\par [address] [place] [address]\[place] [address] \par

## 2021-11-15 NOTE — ASSESSMENT
[FreeTextEntry1] : With respect to his low libido as his testosterone gets low he is going to lose interest he has had this before he just needs reassurance sex is a very important part of his life.\par \par With respect to his ED the implant is working he will follow up with Dr. Canales as needed\par \par With respect to the testosterone is quite low going to give him 6 pellets into the right buttock.  I am arranging for bloods in 3-1/2 months.  If he finds his libido drops before then he should get the blood drawn sooner the slip is going to say approximately.  He will then let us know when it was drawn so we can check and if it is indeed low we will get him in normally.

## 2021-11-15 NOTE — LETTER HEADER
[FreeTextEntry3] : Mohan Hernández M.D.\par Director of Urology\par [facility]\par [address] [place] [address], [address]\[place] [address]

## 2021-11-15 NOTE — PHYSICAL EXAM
[Abdomen Soft] : soft [Abdomen Tenderness] : non-tender [Costovertebral Angle Tenderness] : no ~M costovertebral angle tenderness [] : no respiratory distress [Exaggerated Use Of Accessory Muscles For Inspiration] : no accessory muscle use [Respiration, Rhythm And Depth] : normal respiratory rhythm and effort [Oriented To Time, Place, And Person] : oriented to person, place, and time [Affect] : the affect was normal [Mood] : the mood was normal [Not Anxious] : not anxious [Normal Station and Gait] : the gait and station were normal for the patient's age [FreeTextEntry1] : increased girth

## 2021-12-16 NOTE — ED PROVIDER NOTE - NS ED MD DISPO DISCHARGE
Please call location in [place] to provide [facility] with the NPI and Tax ID number for the servicing provider.    Left 2nd message for supervisor Alicia Lizarraga at [facility] to obtain NPI and Tax ID for their facility.   Home

## 2022-03-09 ENCOUNTER — LABORATORY RESULT (OUTPATIENT)
Age: 77
End: 2022-03-09

## 2022-03-21 ENCOUNTER — APPOINTMENT (OUTPATIENT)
Dept: UROLOGY | Facility: CLINIC | Age: 77
End: 2022-03-21
Payer: MEDICARE

## 2022-03-21 VITALS
BODY MASS INDEX: 36.79 KG/M2 | SYSTOLIC BLOOD PRESSURE: 136 MMHG | HEART RATE: 106 BPM | DIASTOLIC BLOOD PRESSURE: 81 MMHG | HEIGHT: 70 IN | WEIGHT: 257 LBS

## 2022-03-21 PROCEDURE — 11980 IMPLANT HORMONE PELLET(S): CPT

## 2022-03-21 PROCEDURE — 99214 OFFICE O/P EST MOD 30 MIN: CPT | Mod: 25

## 2022-03-21 NOTE — LETTER HEADER
[FreeTextEntry3] : Mohan Hernández M.D.\par Director of Urology\par [facility]\par [address] [place] [address], [address]\[place] [address]

## 2022-03-21 NOTE — HISTORY OF PRESENT ILLNESS
[FreeTextEntry1] : 76-year-old male born July 2, 1945 following for many many years.  He had an implant done by Dr. Canales almost a year ago, its been working well except that as the Testopel wears off he has no libido so they inflated the device so his girlfriend can get her satisfaction but right now he is not into it.  He had bloods drawn on March 9 he is here for review possibly the next dose.  He also says that he thinks he is waiting too long.  In the past we had the bloods in 3 months he was too highly and ended up with a wasted visit so we  put up to 3-1/2.  However even then 3-1/2 should have been the beginning of March, not the middle of the second week.  We will see what the numbers show and then plan with the dates [Currently Experiencing ___] :  [unfilled] [Erectile Dysfunction] : Erectile Dysfunction [Fatigue] : fatigue

## 2022-03-21 NOTE — PHYSICAL EXAM
[Abdomen Soft] : soft [Abdomen Tenderness] : non-tender [Costovertebral Angle Tenderness] : no ~M costovertebral angle tenderness [Urethral Meatus] : meatus normal [Testes Tenderness] : no tenderness of the testes [Heart Rate And Rhythm] : Heart rate and rhythm were normal [FreeTextEntry1] : mild PE [] : no respiratory distress [Respiration, Rhythm And Depth] : normal respiratory rhythm and effort [Exaggerated Use Of Accessory Muscles For Inspiration] : no accessory muscle use [Oriented To Time, Place, And Person] : oriented to person, place, and time [Affect] : the affect was normal [Mood] : the mood was normal [Not Anxious] : not anxious [Normal Station and Gait] : the gait and station were normal for the patient's age

## 2022-03-21 NOTE — ASSESSMENT
[FreeTextEntry1] : His numbers are lower than I had like them to get that he is symptomatic we will get blood now in 3 months instead of 3-1/2 and have him come in the week after\par \par With respect to him having increasing trouble reaching orgasm, as the testosterone goes low his libido and orgasmic ability will decrease, whether this is due to his underlying dorsal nerve impotence or do the low testosterone or which I believe both remains to be seen.  He now will get 6 pellets his testosterone level should go back up we will see if he can once again reach orgasm.

## 2022-03-21 NOTE — LETTER BODY
[Dear  ___] : Dear  [unfilled], [Courtesy Letter:] : I had the pleasure of seeing your patient, [unfilled], in my office today. [Please see my note below.] : Please see my note below. [Sincerely,] : Sincerely, [FreeTextEntry2] : Aamir Woods MD\par [address] [place] [address]\[place] [address] \par

## 2022-04-28 ENCOUNTER — NON-APPOINTMENT (OUTPATIENT)
Age: 77
End: 2022-04-28

## 2022-05-01 ENCOUNTER — LABORATORY RESULT (OUTPATIENT)
Age: 77
End: 2022-05-01

## 2022-05-02 ENCOUNTER — LABORATORY RESULT (OUTPATIENT)
Age: 77
End: 2022-05-02

## 2022-05-02 ENCOUNTER — APPOINTMENT (OUTPATIENT)
Dept: UROLOGY | Facility: CLINIC | Age: 77
End: 2022-05-02
Payer: MEDICARE

## 2022-05-02 VITALS
BODY MASS INDEX: 37.51 KG/M2 | HEIGHT: 70 IN | SYSTOLIC BLOOD PRESSURE: 123 MMHG | DIASTOLIC BLOOD PRESSURE: 83 MMHG | WEIGHT: 262 LBS | HEART RATE: 90 BPM

## 2022-05-02 DIAGNOSIS — N53.19 OTHER EJACULATORY DYSFUNCTION: ICD-10-CM

## 2022-05-02 LAB
BILIRUB UR QL STRIP: NEGATIVE
CLARITY UR: CLEAR
COLLECTION METHOD: NORMAL
GLUCOSE UR-MCNC: NEGATIVE
HCG UR QL: 0.2 EU/DL
HGB UR QL STRIP.AUTO: NEGATIVE
KETONES UR-MCNC: NEGATIVE
LEUKOCYTE ESTERASE UR QL STRIP: NEGATIVE
NITRITE UR QL STRIP: NEGATIVE
PH UR STRIP: 5.5
PROT UR STRIP-MCNC: NEGATIVE
SP GR UR STRIP: 1

## 2022-05-02 PROCEDURE — 99214 OFFICE O/P EST MOD 30 MIN: CPT

## 2022-05-02 NOTE — LETTER BODY
[Dear  ___] : Dear  [unfilled], [Courtesy Letter:] : I had the pleasure of seeing your patient, [unfilled], in my office today. [Please see my note below.] : Please see my note below. [Sincerely,] : Sincerely, [FreeTextEntry2] : Aamir Wodos MD\par [address] [place] [address]\[place] [address] \par

## 2022-05-02 NOTE — HISTORY OF PRESENT ILLNESS
[FreeTextEntry1] : Jose Roberto is a 76-year-old male, who we have been following for many years for low testosterone currently managed on Testopel, who presents today unexpectedly complaining of pain with ejaculation and foul-smelling urine.\par \par He states about 1 to 2 months ago he ejaculated and noted some mild discomfort at the time.  He did not proceed any further.\par \par Approximately 2 weeks ago he again had discomfort with ejaculation and now has some foul-smelling urine.  He reports urinary frequency/nocturia 4 episodes per night.\par \par He has a IPP and denies any complications with using it.  However, is experiencing decreased penile sensation although, he states that his relationship with his girlfriend has become worse over the past few months, when they are having activity is mainly because he wants to keep her happy, as soon as she has orgasm.  On rare occasion is he able to reach orgasm.  He still finds oral sex, pleasurable but even that is not what it used to be and he has trouble reaching orgasm. [Currently Experiencing ___] :  [unfilled]

## 2022-05-02 NOTE — HISTORY OF PRESENT ILLNESS
[FreeTextEntry1] : Jose Roberto is a 76-year-old male we been following for quite some time for testosterone deficiency by Dr apodaca. \par \par  tri-mix was not working well\par \par He was on a self injection program using is at 0.15 ML of our formula number 16, the most concentrated formula that men MD makes. \par \par underwent IPP on May 13 2021\par 21cm ams cx no RTE \par brian\par 100ml reservoir\par \par Patient is currently experiencing Low testosterone, erectile dysfunction and fatigue.\par \par

## 2022-05-02 NOTE — PHYSICAL EXAM
[General Appearance - Well Developed] : well developed [General Appearance - Well Nourished] : well nourished [Normal Appearance] : normal appearance [Well Groomed] : well groomed [General Appearance - In No Acute Distress] : no acute distress [Abdomen Soft] : soft [Abdomen Tenderness] : non-tender [Costovertebral Angle Tenderness] : no ~M costovertebral angle tenderness [Urethral Meatus] : meatus normal [Penis Abnormality] : normal circumcised penis [Urinary Bladder Findings] : the bladder was normal on palpation [Scrotum] : the scrotum was normal [Testes Tenderness] : no tenderness of the testes [Testes Mass (___cm)] : there were no testicular masses [Prostate Tenderness] : the prostate was not tender [No Prostate Nodules] : no prostate nodules [] : no respiratory distress [Respiration, Rhythm And Depth] : normal respiratory rhythm and effort [Exaggerated Use Of Accessory Muscles For Inspiration] : no accessory muscle use [Oriented To Time, Place, And Person] : oriented to person, place, and time [Affect] : the affect was normal [Mood] : the mood was normal [Not Anxious] : not anxious [Normal Station and Gait] : the gait and station were normal for the patient's age [No Focal Deficits] : no focal deficits [FreeTextEntry1] : 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  On the glans he is sensing at about 25 V on the right shaft about 22 and on the left shaft not 24.  Digital rectal examination nonpainful other than he has a tight anus, nontender prostate.  No EPS noted despite attempts at massage

## 2022-05-02 NOTE — ASSESSMENT
[FreeTextEntry1] : Jose Roberto is a 76-year-old male we been following for quite some time for testosterone deficiency by Dr apodaca. \par \par  tri-mix was not working well\par \par He was on a self injection program using is at 0.15 ML of our formula number 16, the most concentrated formula that men MD makes. \par \par underwent IPP on May 13 2021\par 21cm ams cx no RTE \par brian\par 100ml reservoir\par \par Patient is currently experiencing Low testosterone, erectile dysfunction and fatigue.

## 2022-05-02 NOTE — ASSESSMENT
[FreeTextEntry1] : He may have a prostatitis overall, but given that he was given amoxicillin for another infection about 2 weeks ago it may be partially suppressed.  Digital rectal exertion was nontender and no EPS was noted, after digital rectal examination he felt some discomfort.  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  He may have developed some neurologic changes of the past few months.  We will keep an eye on this.\par \par He will follow-up as scheduled and we will contact him with results if anything is positive

## 2022-05-20 ENCOUNTER — APPOINTMENT (OUTPATIENT)
Dept: UROLOGY | Facility: CLINIC | Age: 77
End: 2022-05-20

## 2022-06-20 ENCOUNTER — APPOINTMENT (OUTPATIENT)
Dept: PAIN MANAGEMENT | Facility: CLINIC | Age: 77
End: 2022-06-20
Payer: OTHER MISCELLANEOUS

## 2022-06-20 VITALS
DIASTOLIC BLOOD PRESSURE: 89 MMHG | SYSTOLIC BLOOD PRESSURE: 144 MMHG | HEIGHT: 70 IN | WEIGHT: 245 LBS | BODY MASS INDEX: 35.07 KG/M2

## 2022-06-20 PROCEDURE — 99214 OFFICE O/P EST MOD 30 MIN: CPT

## 2022-06-20 NOTE — DISCUSSION/SUMMARY
[Medication Risks Reviewed] : Medication risks reviewed [de-identified] : This is a 75 yo M who sustained a work related accident as mentioned with injuries noted to the cervical and lumbosacral region. He notes acute on chronic lumbar radicular features at this time for which a repeat caudal epidural x1 MAC has been advised after review of imaging/testing.\par \par oxycodone IR 5mg PO TID prn pain, cyclobenzaprine 10mg PO TID prn, hydroxyzine 25mg PO BID, prn added.\par \par Caudal epidural injection\par Patient had a MRI that shows a radicular component along with pain referred into the lower extremity. Patient has trialed rehab (Home exercise, physical therapy or chiropractic care) and medications I will schedule a caudal 1-3 depending on effectiveness.\par \par MAC\par The patient has severe anxiety of procedures that necessitates monitored anesthesia care (MAC). The procedure performed will be close to major nerves, arteries, and spinal cord and/or joint structures. Due to the proximity of these structures, we need the patient to be still during the procedure.  With the help of MAC, this will be safely achieved and decrease the risk of any complications.\par \par I personally reviewed with the PA, this patient's history and physical exam findings, as documented above. I have discussed the relevant areas of concern, having direct implications to the presenting problems and illnesses, and I have personally examined all pertinent and positive and negative findings, which impact on the prior pain management treatment.

## 2022-06-20 NOTE — DATA REVIEWED
[FreeTextEntry1] : 12/2020: CT L spine: moderate-severe lumbar stenosis, L4-5. Severe spinal stenosis along with foraminal narrowing as well at that site.\par \par

## 2022-06-20 NOTE — HISTORY OF PRESENT ILLNESS
[FreeTextEntry1] : This is a continuing active office encounter for a WC injury\par \par HISTORY OF PRESENT ILLNESS: As a review, this is a 76-year-old male who continues to follow up with us for chronic cervical and lumbar pain radiating into the upper extremities related to a 1995 work injury. He has undergone cervical fusion without significant improvement. Medication management has continued as well as intermittent interventional procedures in the form of trigger point injections as well as cervical epidural procedures. He remains content with his response to medication management in the form of oxycodone IR which is consumed up to three times daily.\par \par TODAY: Mr. Irizarry presents for a revisit encounter. He notes acute on chronic low back pain with radicular features. Imaging and testing reviewed, L4-5 spinal stenosis. He has responded well to cervical epidural procedures in the past, most recent epidural conducted three months prior. I have outlined that he can repeat such efforts under MAC and he is agreeable to move forward at this time.\par \par The use of medication in the form of oxycodone IR is to continue without change. The regimen allows for 50% relief pending activity, no adverse issue or side effect noted with the given regimen.

## 2022-06-20 NOTE — REVIEW OF SYSTEMS
[Joint Pain] : joint pain [Joint Stiffness] : joint stiffness [Arthralgia] : no arthralgia [Fever] : no fever [Chills] : no chills [Feeling Tired] : no fatigue [Recent Weight Gain (___ Lbs)] : no recent ~M [unfilled] weight gain [Discharge] : no discharge [Decrease Hearing] : no decrease in hearing [Lower Ext Edema] : no lower extremity edema [SOB at rest] : no shortness of breath at rest [Abdominal Pain] : no abdominal pain [Urinary Frequency] : no urinary frequency [Breast Pain] : no pain ~T in breast [Headache] : no headache [Feeling Weak] : not feeling weak [Easy Bleeding] : no tendency for easy bleeding

## 2022-06-20 NOTE — PHYSICAL EXAM
[Flexion] : flexion [Extension] : extension [Rotation to left] : rotation to left [Rotation to right] : rotation to right [] : no thoracic paraspinal spasm

## 2022-07-05 ENCOUNTER — NON-APPOINTMENT (OUTPATIENT)
Age: 77
End: 2022-07-05

## 2022-07-06 ENCOUNTER — EMERGENCY (EMERGENCY)
Facility: HOSPITAL | Age: 77
LOS: 0 days | Discharge: LEFT BEFORE DISCHARGE | End: 2022-07-06
Attending: EMERGENCY MEDICINE | Admitting: EMERGENCY MEDICINE

## 2022-07-06 VITALS
SYSTOLIC BLOOD PRESSURE: 134 MMHG | RESPIRATION RATE: 18 BRPM | HEART RATE: 101 BPM | DIASTOLIC BLOOD PRESSURE: 78 MMHG | OXYGEN SATURATION: 99 % | TEMPERATURE: 98 F | HEIGHT: 70 IN | WEIGHT: 240.08 LBS

## 2022-07-06 DIAGNOSIS — Z90.89 ACQUIRED ABSENCE OF OTHER ORGANS: Chronic | ICD-10-CM

## 2022-07-06 DIAGNOSIS — Z53.21 PROCEDURE AND TREATMENT NOT CARRIED OUT DUE TO PATIENT LEAVING PRIOR TO BEING SEEN BY HEALTH CARE PROVIDER: ICD-10-CM

## 2022-07-06 DIAGNOSIS — R69 ILLNESS, UNSPECIFIED: ICD-10-CM

## 2022-07-06 DIAGNOSIS — Z95.810 PRESENCE OF AUTOMATIC (IMPLANTABLE) CARDIAC DEFIBRILLATOR: Chronic | ICD-10-CM

## 2022-07-06 DIAGNOSIS — I25.10 ATHEROSCLEROTIC HEART DISEASE OF NATIVE CORONARY ARTERY WITHOUT ANGINA PECTORIS: Chronic | ICD-10-CM

## 2022-07-06 LAB — ESTRADIOL SERPL-MCNC: 17 PG/ML

## 2022-07-06 PROCEDURE — L9991: CPT

## 2022-07-06 NOTE — ED ADULT TRIAGE NOTE - ARRIVAL FROM
[id number]: Afebrile. VSS. LS clear on RA. Complaints of pain on bottom, WOC consulted. Also complaining of itchiness/pain in the vaginal area. MD aware. Minimal nausea. Good UOP, no stool. Minimal appetite. Soaked in tub for a bit. Mother at bedside. Hourly rounding complete. Continue to monitor and notify MD of any changes or concerns.   Home

## 2022-07-08 LAB — SHBG SERPL-SCNC: 63.3 NMOL/L

## 2022-07-10 ENCOUNTER — LABORATORY RESULT (OUTPATIENT)
Age: 77
End: 2022-07-10

## 2022-07-11 ENCOUNTER — APPOINTMENT (OUTPATIENT)
Dept: UROLOGY | Facility: CLINIC | Age: 77
End: 2022-07-11
Payer: MEDICARE

## 2022-07-11 VITALS
HEIGHT: 70 IN | DIASTOLIC BLOOD PRESSURE: 77 MMHG | WEIGHT: 240 LBS | BODY MASS INDEX: 34.36 KG/M2 | SYSTOLIC BLOOD PRESSURE: 128 MMHG | HEART RATE: 88 BPM

## 2022-07-11 LAB
TESTOST FREE SERPL-MCNC: 3.5 PG/ML
TESTOST SERPL-MCNC: 448 NG/DL
TESTOSTERONE FREE/WEAKLY BND: 35.8 NG/DL
TESTOSTERONE TOTAL S: 389 NG/DL
TESTOSTERONE, % FREE/WEAKLY BND: 9.2 %

## 2022-07-11 PROCEDURE — S0189: CPT

## 2022-07-11 PROCEDURE — 11980 IMPLANT HORMONE PELLET(S): CPT

## 2022-07-11 PROCEDURE — 99215 OFFICE O/P EST HI 40 MIN: CPT | Mod: 25

## 2022-07-11 RX ORDER — ASPIRIN 81 MG
81 TABLET, DELAYED RELEASE (ENTERIC COATED) ORAL
Refills: 0 | Status: ACTIVE | COMMUNITY

## 2022-07-11 RX ORDER — RABEPRAZOLE SODIUM 20 MG/1
20 TABLET, DELAYED RELEASE ORAL
Qty: 90 | Refills: 0 | Status: COMPLETED | COMMUNITY
Start: 2017-12-26 | End: 2022-07-11

## 2022-07-11 RX ORDER — SULFAMETHOXAZOLE AND TRIMETHOPRIM 800; 160 MG/1; MG/1
800-160 TABLET ORAL
Qty: 14 | Refills: 0 | Status: COMPLETED | COMMUNITY
Start: 2022-05-06 | End: 2022-07-11

## 2022-07-11 NOTE — HISTORY OF PRESENT ILLNESS
[Currently Experiencing ___] :  [unfilled] [FreeTextEntry1] : He was last seen May 2, 2022 and developed some pain with ejaculation and foul-smelling urine we found he had a Klebsiella urinary tract infection and treated him with Bactrim.  I thought he was going to repeat the urine test that was not done he tells me the discomfort is much better but still there occasionally.\par \par A second issue he has is he has no desire please note this was happening when he had gotten the recent dose of Testopel at normal levels so was not hormonal.  Part of the problem may be that the penis has developed also nerve impotence, and he really cannot feel much anymore.  Though he is having sex, he has an implant so his partner gets pleasure on the few times they have had sex since I saw him in May he was only able to reach orgasm once.  He enjoys her pleasure he would just like to have his own.

## 2022-07-11 NOTE — LETTER BODY
[Dear  ___] : Dear  [unfilled], [Courtesy Letter:] : I had the pleasure of seeing your patient, [unfilled], in my office today. [Please see my note below.] : Please see my note below. [Sincerely,] : Sincerely, [FreeTextEntry2] : Aamir Woods MD\par [address] [place] [address]\[place] [address] \par

## 2022-07-11 NOTE — LETTER HEADER
[FreeTextEntry3] : Mohan Hernández M.D.\par Director Emeritus of Urology\par [facility]\par [address] [place] [address], [address]\[place] [address]

## 2022-07-11 NOTE — ASSESSMENT
[FreeTextEntry1] : With respect to his hormones he is due for his next dose we will give him 6 Testopel this time into the right buttock.\par \par With respect to his inability to reach orgasm recommended he see a neurologist to see if they can find out why he is having these different neurological issues.  That includes his right upper extremity weakness and the dorsal nerve impotence.  I am also going to suggest he sees Dr. Canales again to see if he has anything to add as far as the inability to reach orgasm\par \par With his dysuria and previous UTI we will order a repeat urinalysis and culture

## 2022-07-11 NOTE — END OF VISIT
[Time Spent: ___ minutes] : I have spent [unfilled] minutes of time on the encounter. [>50% of the face to face encounter time was spent on counseling and/or coordination of care for ___] : Greater than 50% of the face to face encounter time was spent on counseling and/or coordination of care for [unfilled] 39w5d (3) no apparent problem

## 2022-07-11 NOTE — PHYSICAL EXAM
[Abdomen Soft] : soft [Abdomen Tenderness] : non-tender [Costovertebral Angle Tenderness] : no ~M costovertebral angle tenderness [Testes Tenderness] : no tenderness of the testes [Heart Rate And Rhythm] : Heart rate and rhythm were normal [] : no respiratory distress [Respiration, Rhythm And Depth] : normal respiratory rhythm and effort [Exaggerated Use Of Accessory Muscles For Inspiration] : no accessory muscle use [Oriented To Time, Place, And Person] : oriented to person, place, and time [Affect] : the affect was normal [Mood] : the mood was normal [Not Anxious] : not anxious [FreeTextEntry1] : 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

## 2022-07-12 LAB
APPEARANCE: ABNORMAL
BILIRUBIN URINE: NEGATIVE
BLOOD URINE: NEGATIVE
COLOR: YELLOW
GLUCOSE QUALITATIVE U: NEGATIVE
KETONES URINE: NEGATIVE
LEUKOCYTE ESTERASE URINE: NEGATIVE
NITRITE URINE: NEGATIVE
PH URINE: 5
PROTEIN URINE: NEGATIVE
SPECIFIC GRAVITY URINE: 1.02
UROBILINOGEN URINE: NORMAL

## 2022-07-13 LAB — BACTERIA UR CULT: NORMAL

## 2022-07-22 ENCOUNTER — APPOINTMENT (OUTPATIENT)
Dept: PAIN MANAGEMENT | Facility: CLINIC | Age: 77
End: 2022-07-22

## 2022-07-22 VITALS
HEIGHT: 70 IN | WEIGHT: 240 LBS | SYSTOLIC BLOOD PRESSURE: 159 MMHG | HEART RATE: 96 BPM | BODY MASS INDEX: 34.36 KG/M2 | DIASTOLIC BLOOD PRESSURE: 97 MMHG

## 2022-07-22 PROCEDURE — 99072 ADDL SUPL MATRL&STAF TM PHE: CPT

## 2022-07-22 PROCEDURE — 99214 OFFICE O/P EST MOD 30 MIN: CPT

## 2022-07-22 NOTE — HISTORY OF PRESENT ILLNESS
[FreeTextEntry1] : This is a continuing active office encounter for a WC injury\par \par HISTORY OF PRESENT ILLNESS: As a review, this is a 76-year-old male who continues to follow up with us for chronic cervical and lumbar pain radiating into the upper extremities related to a 1995 work injury. He has undergone cervical fusion without significant improvement. Medication management has continued as well as intermittent interventional procedures in the form of trigger point injections as well as cervical epidural procedures. He remains content with his response to medication management in the form of oxycodone IR which is consumed up to three times daily.\par \par TODAY: he returns for a revisit encounter. At his last encounter we had requested a caudal epidural to be completed; no response received. He notes persistent lumbar pain with moderate-severe pain involving the lower extremities with reports of numbness, tingling, burning features reported. He wishes to proceed with injection treatments and we will follow-up with the prior issued request\par \par In the interim, the use of oxycodone IR is to continue without change; see below.

## 2022-07-22 NOTE — DISCUSSION/SUMMARY
[Medication Risks Reviewed] : Medication risks reviewed [de-identified] : This is a 75 yo M who sustained a work related accident as mentioned with injuries noted to the cervical and lumbosacral region. He notes acute on chronic lumbar radicular features at this time for which a repeat caudal epidural x1 MAC has been advised after review of imaging/testing.\par \par oxycodone IR 5mg PO TID prn pain, cyclobenzaprine 10mg PO TID prn, hydroxyzine 25mg PO BID, prn added.\par \par Caudal epidural injection (requested once again)\par Patient had a MRI that shows a radicular component along with pain referred into the lower extremity. Patient has trialed rehab (Home exercise, physical therapy or chiropractic care) and medications I will schedule a caudal 1-3 depending on effectiveness.\par \par MAC\par The patient has severe anxiety of procedures that necessitates monitored anesthesia care (MAC). The procedure performed will be close to major nerves, arteries, and spinal cord and/or joint structures. Due to the proximity of these structures, we need the patient to be still during the procedure.  With the help of MAC, this will be safely achieved and decrease the risk of any complications.\par \par I personally reviewed with the PA, this patient's history and physical exam findings, as documented above. I have discussed the relevant areas of concern, having direct implications to the presenting problems and illnesses, and I have personally examined all pertinent and positive and negative findings, which impact on the prior pain management treatment.

## 2022-07-25 ENCOUNTER — FORM ENCOUNTER (OUTPATIENT)
Age: 77
End: 2022-07-25

## 2022-07-29 ENCOUNTER — NON-APPOINTMENT (OUTPATIENT)
Age: 77
End: 2022-07-29

## 2022-08-11 ENCOUNTER — APPOINTMENT (OUTPATIENT)
Dept: PAIN MANAGEMENT | Facility: CLINIC | Age: 77
End: 2022-08-11

## 2022-08-11 ENCOUNTER — APPOINTMENT (OUTPATIENT)
Dept: PAIN MANAGEMENT | Facility: CLINIC | Age: 77
End: 2022-08-11
Payer: OTHER MISCELLANEOUS

## 2022-08-11 PROCEDURE — 93040 RHYTHM ECG WITH REPORT: CPT | Mod: 79

## 2022-08-11 PROCEDURE — 93770 DETERMINATION VENOUS PRESS: CPT

## 2022-08-11 PROCEDURE — 99072 ADDL SUPL MATRL&STAF TM PHE: CPT | Mod: QZ

## 2022-08-11 PROCEDURE — 94761 N-INVAS EAR/PLS OXIMETRY MLT: CPT

## 2022-08-11 PROCEDURE — 99072 ADDL SUPL MATRL&STAF TM PHE: CPT

## 2022-08-11 PROCEDURE — 00630 ANES PX LUMBAR REGION NOS: CPT | Mod: QZ,P3

## 2022-08-11 PROCEDURE — 62323 NJX INTERLAMINAR LMBR/SAC: CPT

## 2022-08-25 ENCOUNTER — APPOINTMENT (OUTPATIENT)
Dept: PAIN MANAGEMENT | Facility: CLINIC | Age: 77
End: 2022-08-25

## 2022-08-25 VITALS
HEIGHT: 70 IN | SYSTOLIC BLOOD PRESSURE: 162 MMHG | WEIGHT: 240 LBS | HEART RATE: 101 BPM | BODY MASS INDEX: 34.36 KG/M2 | DIASTOLIC BLOOD PRESSURE: 94 MMHG

## 2022-08-25 PROCEDURE — 99072 ADDL SUPL MATRL&STAF TM PHE: CPT

## 2022-08-25 PROCEDURE — 99213 OFFICE O/P EST LOW 20 MIN: CPT

## 2022-08-25 NOTE — PHYSICAL EXAM
[de-identified] : Examination of the neck is as follows: \par Inspection: no scars, no erythema, no ecchymosis, no palpable masses. \par Palpation (Bilateral): paracervical spasm, paracervical tenderness, but no trapezial spasm, no trapezial tenderness, no rhomboid spasm, no rhomboid tenderness. \par ROM: diminished ROM in all planes. Pain at extremes of: flexion, extension, rotation to left, rotation to right. Pain radiates to left arm with motion. Numbness radiates to left arm with motion. Pain radiates to right arm with motion. Numbness radiates to right arm with motion. \par Strength: motor exam is non-focal throughout both upper extremities. \par \par Examination of the spine is as follows: \par Inspection: incisions clean and dry, skin intact, no atrophy, no erythema, no ecchymosis, no palpable masses, no swelling, no sign of infection, no induration. \par Palpation (Bilateral): lumbar paraspinal spasm, lumbar paraspinal tenderness, sciatic nerve tenderness, numbness/tingling of leg, numbness/tingling of foot, but no thoracic paraspinal spasm. \par ROM: diminished ROM in all planes. \par Strength: motor exam is non-focal throughout both lower extremities. \par Testing (Bilateral): positive sitting straight leg raise. \par \par Gait: mildly antalgic,   Does not use an assistive device.

## 2022-08-25 NOTE — DISCUSSION/SUMMARY
[de-identified] : Medication risks reviewed. This is a 77 year old man who sustained a work related accident as mentioned with injuries noted to the cervical and lumbosacral region. He is s/p a lumbar caudal injection 08/11/22 with over 50% relief of his lower back and lower extremity pain. Unfortunately some pain still persists. Patient also has a primary complaint of lower extremity numbness and tingling.  He continues to have acute on chronic lumbar radicular features at this time for which a repeat caudal epidural x1 MAC has been advised after review of imaging/testing. I am also referring him to Neurosurgery, Dr Henderson, at this time as patient complains of numbness and tingling as well as bladder urgency. Oxycodone, cyclobenzaprine, and hydroxyzine will be refilled.\par \par Caudal epidural injection (requested once again)\par Patient had a MRI that shows a radicular component along with pain referred into the lower extremity. Patient has trialed rehab (Home exercise, physical therapy or chiropractic care) and medications I will schedule a caudal 1-3 depending on effectiveness.\par \par MAC\par The patient has severe anxiety of procedures that necessitates monitored anesthesia care (MAC). The procedure performed will be close to major nerves, arteries, and spinal cord and/or joint structures. Due to the proximity of these structures, we need the patient to be still during the procedure. With the help of MAC, this will be safely achieved and decrease the risk of any complications.\par \par Entered by Bhanu Nation, acting as scribe for Dr. Phillips.\par \par  \par \par The documentation recorded by the scribe, in my presence, accurately reflects the\par service I personally performed, and the decisions made by me with my edits as appropriate.\par \par  \par \par Thank  you for allowing me to assist in the management of this patient.\par \par \par Best Regards,\par \par \par Kathie Phillips M.D., FAAPMR\par \par \par Diplomate, American Board of Physical Medicine and Rehabilitation\par Diplomate, American Board of Pain Medicine\par Diplomate, American Board of Pain Management

## 2022-08-25 NOTE — DATA REVIEWED
[FreeTextEntry1] : 12/2020: CT L spine: moderate-severe lumbar stenosis, L4-5. Severe spinal stenosis along with foraminal narrowing as well at that site.

## 2022-08-25 NOTE — HISTORY OF PRESENT ILLNESS
[FreeTextEntry1] : ORIGINAL PRESENTATION: Mr. Irizarry is a 77-year-old male who continues to follow up with us for chronic cervical and lumbar pain radiating into the upper extremities related to a 1995 work injury. He has undergone cervical fusion without significant improvement. Medication management has continued as well as intermittent interventional procedures in the form of trigger point injections as well as cervical epidural procedures. He remains content with his response to medication management in the form of oxycodone IR which is consumed up to three times daily.\par \par He states his pain is of undetermined origin, moderate to severe, constant, in no typical pattern. He states it is pressure-like, dull aching, and throbbing. He denies any weakness in the upper lower extremities. He states that lying down standing sitting walking and exercise increases his pain. Denies any recent change in bowel or bladder habits.\par \par ACTIVITIES: He states he can walk 3 blocks before his pain begins. He can sit and stand for 30 minutes before his pain begins. In a day he sometimes has lie down because of pain. He currently uses no assistive walking device.\par \par PRIOR PAIN TREATMENTS: Surgery by Dr. Henderson in 1996 gave him moderate relief.\par \par PRIOR PAIN MEDICATIONS: Tylenol, cyclobenzaprine, oxycodone, Percocet.\par \par TODAY: he returns for a revisit encounter. Patient is s/p a lumbar caudal injection 08/11/22. He had two prior injections in February 2022 and March 2022. He notes improvement of his lower back and leg pain. He is able to walk better at this time. He notes over 50% relief from most recent lumbar caudal injection, some lower extremity pain still persists. Despite some improvement of his lower back pain he notes numbness of the lower extremities. He also notes an issue feeling when he has to urinate due to numbness. He is set to see a urologist. Patient continues to treat with oxycodone, cyclobenzaprine, and hydroxyzine with some relief.

## 2022-09-06 ENCOUNTER — APPOINTMENT (OUTPATIENT)
Dept: NEUROSURGERY | Facility: CLINIC | Age: 77
End: 2022-09-06

## 2022-09-06 PROCEDURE — 99072 ADDL SUPL MATRL&STAF TM PHE: CPT

## 2022-09-06 PROCEDURE — 99214 OFFICE O/P EST MOD 30 MIN: CPT

## 2022-09-06 NOTE — HISTORY OF PRESENT ILLNESS
[de-identified] : Mr. Irizarry is a long term patient of ours. He was last seen in 2018.  he is status post cervical fusion, C7 corpectomy with C3-4 ACDF in 2016.  Prior to that he was fused from C4-C6. Patient presents today with continued lower back pain which he has been having ongoing for since his work related accident. He is s/p a lumbar caudal injection 08/11/22 with over 50% relief of his lower back and lower extremity pain. Unfortunately some pain still persists. Patient also has a primary complaint of lower extremity numbness and tingling. Patient has been having progressive urinary retention. \par \par He has a history of a right carpal tunnel release and right ulnar nerve transposition in 2014.

## 2022-09-06 NOTE — PHYSICAL EXAM
[de-identified] : Examination of the spine is as follows: \par Inspection: incisions clean and dry, skin intact, no atrophy, no erythema, no ecchymosis, no palpable masses, no swelling, no sign of infection, no induration. \par Palpation (Bilateral): lumbar paraspinal spasm, lumbar paraspinal tenderness, sciatic nerve tenderness, numbness/tingling of leg, numbness/tingling of foot, but no thoracic paraspinal spasm. \par ROM: diminished ROM in all planes. \par Strength: motor exam is non-focal throughout both lower extremities. \par Testing (Bilateral): positive sitting straight leg raise

## 2022-09-06 NOTE — DISCUSSION/SUMMARY
[de-identified] : Mr. Irizarry is a long term patient of ours. He was last seen in 2018.  he is status post cervical fusion, C7 corpectomy with C3-4 ACDF in 2016.  Prior to that he was fused from C4-C6. Patient presents today with continued lower back pain which he has been having ongoing for since his work related accident. We need a lumbar myelogram to determine where the lumbar stenosis is present at as the patient continues to have lower back pain and has had no recent studies of his lumbar spine. He is unable to undergo an MRI. I will see him back once completed. \par \par Instructions: Scribe / CW\par I, Bhanu Nation, attest that this documentation has been prepared under the direction and in the presence of Provider Chung Henderson MD. \par \par Thank you for allowing me to assist in the care of this patient. \par \par Chung Henderson MD

## 2022-09-22 ENCOUNTER — APPOINTMENT (OUTPATIENT)
Dept: PAIN MANAGEMENT | Facility: CLINIC | Age: 77
End: 2022-09-22

## 2022-09-22 PROCEDURE — 99213 OFFICE O/P EST LOW 20 MIN: CPT

## 2022-09-22 PROCEDURE — 99072 ADDL SUPL MATRL&STAF TM PHE: CPT

## 2022-09-22 PROCEDURE — 96102W: CUSTOM

## 2022-09-22 NOTE — DISCUSSION/SUMMARY
[de-identified] : Medication risks reviewed. This is a 77 year old man who sustained a work related accident as mentioned with injuries noted to the cervical and lumbosacral region. He is s/p a lumbar caudal injection 08/11/22 with over 50% relief of his lower back and lower extremity pain. Unfortunately some pain still persists. Patient also has a primary complaint of lower extremity numbness and tingling.  He continues to have acute on chronic lumbar radicular features at this time for which a repeat caudal epidural x1 MAC has been advised after review of imaging/testing. I am also referring him to Neurosurgery, Dr Henderson, at this time as patient complains of numbness and tingling as well as bladder urgency. Oxycodone, cyclobenzaprine, and hydroxyzine will be refilled.\par \par Caudal epidural injection (requested once again)\par Patient had a MRI that shows a radicular component along with pain referred into the lower extremity. Patient has trialed rehab (Home exercise, physical therapy or chiropractic care) and medications I will schedule a caudal 1-3 depending on effectiveness.\par \par MAC\par The patient has severe anxiety of procedures that necessitates monitored anesthesia care (MAC). The procedure performed will be close to major nerves, arteries, and spinal cord and/or joint structures. Due to the proximity of these structures, we need the patient to be still during the procedure. With the help of MAC, this will be safely achieved and decrease the risk of any complications.\par \par I, Betzaida Worrell, attest that this documentation has been prepared under the direction and in the presence of Provider Kathie Phillips MD.\par \par \par Thank you for allowing me to assist in the management of this patient. \par \par \par Best Regards, \par \par \par Kathie Phillips M.D., [facility]\par \par \par Diplomate, American Board of Physical Medicine and Rehabilitation\par Diplomate, American Board of Pain Medicine \par Diplomate, American Board of Pain Management\par

## 2022-09-22 NOTE — HISTORY OF PRESENT ILLNESS
[FreeTextEntry1] : ORIGINAL PRESENTATION: Mr. Irizarry is a 77-year-old male who continues to follow up with us for chronic cervical and lumbar pain radiating into the upper extremities related to a 1995 work injury. He has undergone cervical fusion without significant improvement. Medication management has continued as well as intermittent interventional procedures in the form of trigger point injections as well as cervical epidural procedures. He remains content with his response to medication management in the form of oxycodone IR which is consumed up to three times daily.\par \par He states his pain is of undetermined origin, moderate to severe, constant, in no typical pattern. He states it is pressure-like, dull aching, and throbbing. He denies any weakness in the upper lower extremities. He states that lying down standing sitting walking and exercise increases his pain. Denies any recent change in bowel or bladder habits.\par \par ACTIVITIES: He states he can walk 3 blocks before his pain begins. He can sit and stand for 30 minutes before his pain begins. In a day he sometimes has lie down because of pain. He currently uses no assistive walking device.\par \par PRIOR PAIN TREATMENTS: Surgery by Dr. Henderson in 1996 gave him moderate relief.\par \par PRIOR PAIN MEDICATIONS: Tylenol, cyclobenzaprine, oxycodone, Percocet.\par \par PATIENT PRESENTS FOR A FOLLOW UP: Patient was unable to get authorization for a repeat Caudal injection from his last visit. He was provided with moderate relief from previous caudal epidural steroid injection on 8/11/2022, and he wishes to continue with them at this time. Despite some improvement of his lower back pain he notes numbness of the lower extremities. Patient continues to treat with oxycodone 15 mg q 8 hr, cyclobenzaprine, and hydroxyzine with some relief.

## 2022-09-22 NOTE — DATA REVIEWED
[FreeTextEntry1] : 12/2020: CT L spine: moderate-severe lumbar stenosis, L4-5. Severe spinal stenosis along with foraminal narrowing as well at that site.\par \par SOAPP: moderate on 9/22/22\par Patient has a combination of moderate rise SOAP and no risk factors. UDS would be repeated randomly every quarter.\par \par UDS: No data obtained today\par \par [place] REGISTRY: Checked\par  Yes

## 2022-09-22 NOTE — PHYSICAL EXAM
[de-identified] : Examination of the neck is as follows: \par Inspection: no scars, no erythema, no ecchymosis, no palpable masses. \par Palpation (Bilateral): paracervical spasm, paracervical tenderness, but no trapezial spasm, no trapezial tenderness, no rhomboid spasm, no rhomboid tenderness. \par ROM: diminished ROM in all planes. Pain at extremes of: flexion, extension, rotation to left, rotation to right. Pain radiates to left arm with motion. Numbness radiates to left arm with motion. Pain radiates to right arm with motion. Numbness radiates to right arm with motion. \par Strength: motor exam is non-focal throughout both upper extremities. \par \par Examination of the spine is as follows: \par Inspection: incisions clean and dry, skin intact, no atrophy, no erythema, no ecchymosis, no palpable masses, no swelling, no sign of infection, no induration. \par Palpation (Bilateral): lumbar paraspinal spasm, lumbar paraspinal tenderness, sciatic nerve tenderness, numbness/tingling of leg, numbness/tingling of foot, but no thoracic paraspinal spasm. \par ROM: diminished ROM in all planes. \par Strength: motor exam is non-focal throughout both lower extremities. \par Testing (Bilateral): positive sitting straight leg raise. \par \par Gait: mildly antalgic,   Does not use an assistive device.

## 2022-09-22 NOTE — ASSESSMENT
[FreeTextEntry1] : Mr. Irizarry ia a  77-year-old gentleman who sustained a work related accident in 1995 affecting his neck and lower back. Authorization never went through so we will  order a repeat caudal epidural x1 with MAC. In addition referral to Dr. Henderson to discuss the numbness in his lower extremities and bladder issues.  Oxycodone 15 mg p.o. t.i.d., cyclobenzaprine, and hydroxyzine will be refilled.  He will follow up in 4 weeks.\par \par I explained the risk of addiction, tolerance and withdrawals. UDS will be done randomly and a drug agreement was signed.\par \par I advised the patient they must keep their medication under a lock and key, or in a safe place away from children or other individuals. This medication given is solely for the patient and under no circumstances to be shared. Patient verbalized this and is in agreement with the aforementioned. I explained the risk of addiction, tolerance, and withdrawals. UDS will be done randomly and a drug agreement was signed.\par \par Patient had a MRI that shows a radicular component along with pain referred into the lower extremity. Patient has trialed rehab (Home exercise, physical therapy or chiropractic care) and medications I will schedule a Caudal 1-3 depending on effectiveness.\par \par The patient has severe anxiety of procedures that necessitates monitored anesthesia care (MAC). The procedure performed will be close to major nerves, arteries, and spinal cord and/or joint structures. Due to the proximity of these structures, we need the patient to be still during the procedure. With the help of MAC, this will be safely achieved and decrease the risk of any complications.\par \par Risk, benefits, pros and cons of procedure were explained to the patient using models and diagrams and their questions were answered.\par \par Thank you for allowing me to assist in the management of this patient. \par \par \par  Best Regards, \par \par \par  Kathie Phillips M.D., FAAPMR\par \par \par  Diplomate, American Board of Physical Medicine and Rehabilitation\par  Diplomate, American Board of Pain Medicine \par  Diplomate, American Board of Pain Management\par \par \par

## 2022-09-23 ENCOUNTER — APPOINTMENT (OUTPATIENT)
Dept: UROLOGY | Facility: CLINIC | Age: 77
End: 2022-09-23

## 2022-09-23 PROCEDURE — 99213 OFFICE O/P EST LOW 20 MIN: CPT

## 2022-09-23 NOTE — ASSESSMENT
[FreeTextEntry1] : Jose Roberto is a 77-year-old male we been following for quite some time for testosterone deficiency by Dr apodaca. \par \par underwent IPP on May 13 2021\par 21cm ams cx no RTE \par brian\par 100ml reservoir\par \par Patient is currently experiencing Low testosterone, erectile dysfunction and fatigue. \par last visit he complained that he had lost some sensation to the penis during sex and was having trouble achieving orgasm\par  possibly related to newly diagnosed spinal issues

## 2022-09-28 NOTE — REVIEW OF SYSTEMS
[Feeling Tired] : feeling tired [see HPI] : see HPI [Erectile Dysfunction] : erectile dysfunction The pt will be able to perform all transfer training activities independently by time of discharge from acute care PT program. [Negative] : Heme/Lymph GOAL: Pt will perform sit to/from stand transfers with min AX1 with/without AD as needed within 4weeks.

## 2022-09-29 ENCOUNTER — FORM ENCOUNTER (OUTPATIENT)
Age: 77
End: 2022-09-29

## 2022-10-06 ENCOUNTER — APPOINTMENT (OUTPATIENT)
Dept: PAIN MANAGEMENT | Facility: CLINIC | Age: 77
End: 2022-10-06

## 2022-10-06 PROCEDURE — 93040 RHYTHM ECG WITH REPORT: CPT | Mod: 79

## 2022-10-06 PROCEDURE — 62323 NJX INTERLAMINAR LMBR/SAC: CPT

## 2022-10-06 PROCEDURE — 99152Z: CUSTOM

## 2022-10-06 PROCEDURE — 93770 DETERMINATION VENOUS PRESS: CPT

## 2022-10-06 PROCEDURE — 99072 ADDL SUPL MATRL&STAF TM PHE: CPT

## 2022-10-06 NOTE — PROCEDURE
[FreeTextEntry3] : \par CAUDAL EPIDURAL STEROID INJECTION\par \par  \par \par Date:  10/06/2022\par \par Patient: CHRISTINA MUSTAFA\par \par :  1945\par \par \par Preoperative Diagnosis: Lumbar Radiculopathy\par \par \par \par Postoperative Diagnosis: Lumbar Radiculopathy\par \par \par \par Procedure: Caudal epidural injection under fluoroscopic guidance\par \par \par \par Physician: Kathie Phillips MD, FAAPMR\par \par \par Anesthesia: See Nurses notes. MAC - 2 mg Versed and 100mcg Fentanyl\par \par \par \par Medical Necessity:  Failure of conservative management.\par \par \par \par Indication for Fluoroscopy:  This procedure requires the precise placement of the spinal needle into the epidural space.  It is the only way to accurately and safely perform the injection.\par \par \par \par CONSENT: The possible complications including infection, bleeding, nerve damage, hospital admission or failure of the procedure; though unusual, are theoretically possible. The patient was educated about the of the procedure and alternative therapies. All questions were answered and the patient freely gave consent to proceed.\par \par \par \par Monitoring:  Patient had continuous blood pressure, EKG, and pulse oximetry throughout the case. See nurse's notes.\par \par  \par \par PROCEDURE NOTE:\par \par After obtaining written consent, the patient was placed in the prone position with a pillow under the pelvis. Multiple fluoroscopic views of the sacrum-AP & Lateral- were obtained. The lower back and upper gluteal region were prepped with betadine and draped in the sterile fashion. A time out was performed.  The skin over the sacral hiatus was infiltrated with local anesthetic and a 18 gauge 3 ½ inch Tuohy needle was inserted. The angle of the needle was lowered until it was felt to penetrate the sacrococcygeal ligament at which time the needle was advanced without resistance. Fluoroscopy confirmed the position of the needle within the caudal space. Omnipaque 240, 3 cc was injected to confirm an appropriate epidural spread. A total of 9ml of preservative-free sterile saline, 1 ml of depomedrol (80mg/cc) was injected via the needle into the caudal space.  The needle was cleared with three ml preservative-free normal saline.  There was no evidence of CSF or heme. The needle was removed intact. A band aid was place on the site.\par \par  \par \par Epiduragram Report: A spinal needle is in place in the caudal epidural space.  Central epidural spread of dye is noted from the lower sacral segments to L4-5.    Dye is seen outlining the sacral nerve roots bilaterally as they emerge from their respective foramen without obstruction pointing away from adhesion/fibrosis\par \par \par Complications: None. The patient tolerated the procedure well. \par \par  \par \par Disposition: I have examined the patient and there are no new physical findings since the original presentation.  Sensory and motor function were intact. The patient met discharge criteria see nurses notes. The discharge instruction sheet was reviewed and given to the patient. The patient was discharged home with a . If patient gets sustained relief will have patient do modified planks 3 times a day on carpet or yoga mat starting at 5 seconds building up to 1 minute without grimacing/Valsalva and walking.\par \par  \par \par Comments: 1st caudal ADAL today, depending on effectiveness would schedule a 2nd caudal ADAL in 1-2 weeks or follow up in office depending on insurance. Call if any problems.\par \par \par \par \par This document was electronically signed by: \par \par \par Kathie Phillips MD, FAAPMR\par \par Diplomate, American Board of Physical Medicine and Rehabilitation\par Diplomate, American Board of Pain Medicine Statement Selected

## 2022-10-06 NOTE — H&P PST ADULT - TEACHING/LEARNING FACTORS IMPACT ABILITY TO LEARN
I have given you a prescription of reglan  Take 1 pill every 6 hours as needed for nausea 
Opt out
none

## 2022-10-07 LAB
ANION GAP SERPL CALC-SCNC: 14 MMOL/L
APTT BLD: 28.5 SEC
BASOPHILS # BLD AUTO: 0.07 K/UL
BASOPHILS NFR BLD AUTO: 0.7 %
BUN SERPL-MCNC: 13 MG/DL
CALCIUM SERPL-MCNC: 9.6 MG/DL
CHLORIDE SERPL-SCNC: 101 MMOL/L
CO2 SERPL-SCNC: 22 MMOL/L
CREAT SERPL-MCNC: 0.9 MG/DL
EGFR: 89 ML/MIN/1.73M2
EOSINOPHIL # BLD AUTO: 0.01 K/UL
EOSINOPHIL NFR BLD AUTO: 0.1 %
GLUCOSE SERPL-MCNC: 125 MG/DL
HCT VFR BLD CALC: 43.1 %
HGB BLD-MCNC: 14.4 G/DL
IMM GRANULOCYTES NFR BLD AUTO: 0.6 %
INR PPP: 1.05 RATIO
LYMPHOCYTES # BLD AUTO: 0.88 K/UL
LYMPHOCYTES NFR BLD AUTO: 9.1 %
MAN DIFF?: NORMAL
MCHC RBC-ENTMCNC: 31.1 PG
MCHC RBC-ENTMCNC: 33.4 G/DL
MCV RBC AUTO: 93.1 FL
MONOCYTES # BLD AUTO: 0.57 K/UL
MONOCYTES NFR BLD AUTO: 5.9 %
NEUTROPHILS # BLD AUTO: 8.1 K/UL
NEUTROPHILS NFR BLD AUTO: 83.6 %
PLATELET # BLD AUTO: 205 K/UL
POTASSIUM SERPL-SCNC: 4.2 MMOL/L
PT BLD: 12 SEC
RBC # BLD: 4.63 M/UL
RBC # FLD: 14.5 %
SODIUM SERPL-SCNC: 137 MMOL/L
WBC # FLD AUTO: 9.69 K/UL

## 2022-10-20 ENCOUNTER — APPOINTMENT (OUTPATIENT)
Dept: PAIN MANAGEMENT | Facility: CLINIC | Age: 77
End: 2022-10-20

## 2022-10-20 ENCOUNTER — APPOINTMENT (OUTPATIENT)
Dept: UROLOGY | Facility: CLINIC | Age: 77
End: 2022-10-20

## 2022-10-20 LAB
ESTRADIOL SERPL-MCNC: 23 PG/ML
PSA FREE FLD-MCNC: 24 %
PSA FREE SERPL-MCNC: 0.06 NG/ML
PSA SERPL-MCNC: 0.25 NG/ML
SHBG SERPL-SCNC: 45.6 NMOL/L

## 2022-10-24 LAB
TESTOST FREE SERPL-MCNC: 4.1 PG/ML
TESTOST SERPL-MCNC: 399 NG/DL

## 2022-10-25 LAB
TESTOSTERONE FREE/WEAKLY BND: 50.4 NG/DL
TESTOSTERONE TOTAL S: 410 NG/DL
TESTOSTERONE, % FREE/WEAKLY BND: 12.3 %

## 2022-10-26 ENCOUNTER — RESULT REVIEW (OUTPATIENT)
Age: 77
End: 2022-10-26

## 2022-10-26 ENCOUNTER — OUTPATIENT (OUTPATIENT)
Dept: OUTPATIENT SERVICES | Facility: HOSPITAL | Age: 77
LOS: 1 days | Discharge: HOME | End: 2022-10-26

## 2022-10-26 ENCOUNTER — TRANSCRIPTION ENCOUNTER (OUTPATIENT)
Age: 77
End: 2022-10-26

## 2022-10-26 ENCOUNTER — APPOINTMENT (OUTPATIENT)
Dept: PAIN MANAGEMENT | Facility: CLINIC | Age: 77
End: 2022-10-26

## 2022-10-26 VITALS
SYSTOLIC BLOOD PRESSURE: 165 MMHG | RESPIRATION RATE: 18 BRPM | OXYGEN SATURATION: 95 % | HEART RATE: 72 BPM | DIASTOLIC BLOOD PRESSURE: 74 MMHG

## 2022-10-26 VITALS
HEART RATE: 106 BPM | SYSTOLIC BLOOD PRESSURE: 153 MMHG | WEIGHT: 240 LBS | BODY MASS INDEX: 34.36 KG/M2 | HEIGHT: 70 IN | DIASTOLIC BLOOD PRESSURE: 90 MMHG

## 2022-10-26 VITALS
HEART RATE: 72 BPM | RESPIRATION RATE: 22 BRPM | TEMPERATURE: 99 F | OXYGEN SATURATION: 97 % | SYSTOLIC BLOOD PRESSURE: 140 MMHG | DIASTOLIC BLOOD PRESSURE: 72 MMHG

## 2022-10-26 DIAGNOSIS — Z90.89 ACQUIRED ABSENCE OF OTHER ORGANS: Chronic | ICD-10-CM

## 2022-10-26 DIAGNOSIS — I25.10 ATHEROSCLEROTIC HEART DISEASE OF NATIVE CORONARY ARTERY WITHOUT ANGINA PECTORIS: Chronic | ICD-10-CM

## 2022-10-26 DIAGNOSIS — M54.16 RADICULOPATHY, LUMBAR REGION: ICD-10-CM

## 2022-10-26 DIAGNOSIS — M48.061 SPINAL STENOSIS, LUMBAR REGION WITHOUT NEUROGENIC CLAUDICATION: ICD-10-CM

## 2022-10-26 DIAGNOSIS — Z95.810 PRESENCE OF AUTOMATIC (IMPLANTABLE) CARDIAC DEFIBRILLATOR: Chronic | ICD-10-CM

## 2022-10-26 LAB
APPEARANCE CSF: CLEAR — SIGNIFICANT CHANGE UP
COLOR CSF: SIGNIFICANT CHANGE UP
GLUCOSE CSF-MCNC: 53 MG/DL — SIGNIFICANT CHANGE UP (ref 45–75)
NEUTROPHILS # CSF: SIGNIFICANT CHANGE UP % (ref 0–6)
NRBC NFR CSF: 3 /UL — SIGNIFICANT CHANGE UP (ref 0–5)
PROT CSF-MCNC: 145 MG/DL — HIGH (ref 15–45)
RBC # CSF: 15 /UL — SIGNIFICANT CHANGE UP (ref 0–0)
TUBE TYPE: SIGNIFICANT CHANGE UP

## 2022-10-26 PROCEDURE — 72131 CT LUMBAR SPINE W/O DYE: CPT | Mod: 26,MH

## 2022-10-26 PROCEDURE — 99072 ADDL SUPL MATRL&STAF TM PHE: CPT

## 2022-10-26 PROCEDURE — 62304 MYELOGRAPHY LUMBAR INJECTION: CPT

## 2022-10-26 PROCEDURE — 99214 OFFICE O/P EST MOD 30 MIN: CPT

## 2022-10-26 NOTE — ASU DISCHARGE PLAN (ADULT/PEDIATRIC) - NS MD DC FALL RISK RISK
For information on Fall & Injury Prevention, visit: [email or web address] OR  [email or web address] OR  https://www.cdc.gov/steadi/patient.html

## 2022-10-26 NOTE — PROCEDURE
[FreeTextEntry1] : CAUDAL EPIDURAL STEROID INJECTION [FreeTextEntry3] : Date: 2022\par Patient: Jose Roberto Irizarry\par : 1945\par .\par Preoperative Diagnosis: Lumbar Radiculopathy\par Postoperative Diagnosis: Lumbar Radiculopathy\par Procedure: Caudal epidural injection under fluoroscopic guidance\par Physician: Kathie Phillips MD, FAAPMR\par Anesthesiologist/CRNA: Ms. Kay\par Anesthesia: See Nurses notes\par Medical Necessity: Failure of conservative management.\par Indication for Fluoroscopy: This procedure requires the precise placement of the spinal needle into the epidural space. It is the only way to accurately and safely perform the injection.\par \par CONSENT: The possible complications including infection, bleeding, nerve damage, hospital admission or failure of the procedure; though unusual, are theoretically possible. The patient was educated about the of the procedure and alternative therapies. All questions were answered and the patient freely gave consent to proceed.\par \par Monitoring: Patient had continuous blood pressure, EKG, and pulse oximetry throughout the case. See nurse's notes.\par \par PROCEDURE NOTE:\par After obtaining written consent, the patient was placed in the prone position with a pillow under the pelvis. Multiple fluoroscopic views of the sacrum-AP & Lateral- were obtained. The lower back and upper gluteal region were prepped with betadine and draped in the sterile fashion. A time out was performed. The skin over the sacral hiatus was infiltrated with local anesthetic and a 18 gauge Tuohy needle was inserted. The angle of the needle was lowered until it was felt to penetrate the sacrococcygeal ligament at which time the needle was advanced without resistance. Fluoroscopy confirmed the position of the needle within the caudal space. Omnipaque 240, 3 cc was injected to confirm an appropriate epidural spread. A total of 9ml of preservative-free sterile saline, 1 ml of depomedrol (80mg/cc) was injected via the needle into the caudal space. The needle was cleared with three ml preservative-free normal saline. There was no evidence of CSF or heme. The needle was \par removed intact. A band aid was place on the site. \par \par Epiduragram Report: A spinal needle is in place in the caudal epidural space. Central epidural spread of dye is noted from the lower sacral segments to L4-5. Dye is seen outlining the sacral nerve roots bilaterally as they emerge from their respective foramen without obstruction pointing away from adhesion/fibrosis\par \par Complications: None. The patient tolerated the procedure well.\par \par Disposition: I have examined the patient and there are no new physical findings since the original presentation. Sensory and motor function were intact. The patient met discharge criteria see nurses notes. The discharge instruction sheet was reviewed and given to the patient. The patient was discharged home with a . If patient gets sustained relief will have patient do modified planks 3 times a day on carpet or yoga mat starting at 5 seconds building up to 1 minute without gimacing/Valsalva and walking.\par \par Comments: 1st caudal ADAL today. Previous Caudal ADAL provided 50% relief. Patient will follow up in office. Depending on effectiveness would schedule another caudal ADAL. Call if any problems.\par \par This document was electronically signed by:\par \par Kathie Phillips MD, FAAPMR\par Diplomate, American Board of Physical Medicine and Rehabilitation\par Diplomate, American Board of Pain Medicine\par

## 2022-10-27 ENCOUNTER — APPOINTMENT (OUTPATIENT)
Dept: UROLOGY | Facility: CLINIC | Age: 77
End: 2022-10-27

## 2022-10-27 NOTE — PHYSICAL EXAM
[Normal Coordination] : normal coordination [Normal DTR UE/LE] : normal DTR UE/LE  [Normal Sensation] : normal sensation [Normal Mood and Affect] : normal mood and affect [Orientated] : orientated [Able to Communicate] : able to communicate [Well Developed] : well developed [Well Nourished] : well nourished [de-identified] : Examination of the neck is as follows: \par Inspection: no scars, no erythema, no ecchymosis, no palpable masses. \par Palpation (Bilateral): paracervical spasm, paracervical tenderness, but no trapezial spasm, no trapezial tenderness, no rhomboid spasm, no rhomboid tenderness. \par ROM: diminished ROM in all planes. Pain at extremes of: flexion, extension, rotation to left, rotation to right. Pain radiates to left arm with motion. Numbness radiates to left arm with motion. Pain radiates to right arm with motion. Numbness radiates to right arm with motion. \par Strength: motor exam is non-focal throughout both upper extremities. \par \par Examination of the spine is as follows: \par Inspection: incisions clean and dry, skin intact, no atrophy, no erythema, no ecchymosis, no palpable masses, no swelling, no sign of infection, no induration. \par Palpation (Bilateral): lumbar paraspinal spasm, lumbar paraspinal tenderness, sciatic nerve tenderness, numbness/tingling of leg, numbness/tingling of foot, but no thoracic paraspinal spasm. \par ROM: diminished ROM in all planes. \par Strength: motor exam is non-focal throughout both lower extremities. \par Testing (Bilateral): positive sitting straight leg raise. \par \par Gait: mildly antalgic,   Does not use an assistive device.

## 2022-10-27 NOTE — DISCUSSION/SUMMARY
[de-identified] : Medication risks reviewed. This is a 77 year old man who sustained a work related accident as mentioned with injuries noted to the cervical and lumbosacral region. He is s/p a lumbar caudal injection 08/11/22 with over 50% relief of his lower back and lower extremity pain. Unfortunately some pain still persists. Patient also has a primary complaint of lower extremity numbness and tingling.  He continues to have acute on chronic lumbar radicular features at this time for which a repeat caudal epidural x1 MAC has been advised after review of imaging/testing. I am also referring him to Neurosurgery, Dr Henderson, at this time as patient complains of numbness and tingling as well as bladder urgency. Oxycodone, cyclobenzaprine, and hydroxyzine will be refilled.\par \par Caudal epidural injection (requested once again)\par Patient had a MRI that shows a radicular component along with pain referred into the lower extremity. Patient has trialed rehab (Home exercise, physical therapy or chiropractic care) and medications I will schedule a caudal 1-3 depending on effectiveness.\par \par MAC\par The patient has severe anxiety of procedures that necessitates monitored anesthesia care (MAC). The procedure performed will be close to major nerves, arteries, and spinal cord and/or joint structures. Due to the proximity of these structures, we need the patient to be still during the procedure. With the help of MAC, this will be safely achieved and decrease the risk of any complications.\par \par I, Betzaida Worrell, attest that this documentation has been prepared under the direction and in the presence of Provider Kathie Phillips MD.\par \par \par Thank you for allowing me to assist in the management of this patient. \par \par \par Best Regards, \par \par \par Kathie Phillips M.D., [facility]\par \par \par Diplomate, American Board of Physical Medicine and Rehabilitation\par Diplomate, American Board of Pain Medicine \par Diplomate, American Board of Pain Management\par

## 2022-10-27 NOTE — DATA REVIEWED
[FreeTextEntry1] : 12/2020: CT L spine: moderate-severe lumbar stenosis, L4-5. Severe spinal stenosis along with foraminal narrowing as well at that site.\par \par SOAPP: moderate on 9/22/22\par Patient has a combination of moderate rise SOAP and no risk factors. UDS would be repeated randomly every quarter.\par \par UDS: No data obtained today\par \par [place] REGISTRY: Checked\par

## 2022-10-27 NOTE — ASSESSMENT
[FreeTextEntry1] : Mr. Irizarry is a 77-year-old gentleman who sustained a work related accident in 1995 affecting his neck and lower back. He is s/p caudal ADLA x 2 w/ MAC which overall provided him 50%. He is aware that the caudal can be repeated for a third time but would like to hold off until his pain returns to baseline. He will continue Oxycodone 15 mg p.o. t.i.d., cyclobenzaprine, and hydroxyzine.  He will follow up in 4 weeks. Medication was refilled today. \par \par Of note, he went for a monologram earlier today so we will repeat a UDS at his next visit. \par \par All this patients questions were answered and the conversation was understood well.\par \par I, Betzaida Worrell, attest that this documentation has been prepared under the direction and in the presence of Provider Kathie Phillips MD.\par \par \par Thank you for allowing me to assist in the management of this patient. \par \par \par Best Regards, \par \par \par Kathie Phillips M.D., [facility]\par \par \par Diplomate, American Board of Physical Medicine and Rehabilitation\par Diplomate, American Board of Pain Medicine \par Diplomate, American Board of Pain Management\par

## 2022-10-27 NOTE — HISTORY OF PRESENT ILLNESS
[FreeTextEntry1] : ORIGINAL PRESENTATION: Mr. Irizarry is a 77-year-old male who continues to follow up with us for chronic cervical and lumbar pain radiating into the upper extremities related to a 1995 work injury. He has undergone cervical fusion without significant improvement. Medication management has continued as well as intermittent interventional procedures in the form of trigger point injections as well as cervical epidural procedures. He remains content with his response to medication management in the form of oxycodone IR which is consumed up to three times daily.\par \par He states his pain is of undetermined origin, moderate to severe, constant, in no typical pattern. He states it is pressure-like, dull aching, and throbbing. He denies any weakness in the upper lower extremities. He states that lying down standing sitting walking and exercise increases his pain. Denies any recent change in bowel or bladder habits.\par \par ACTIVITIES: He states he can walk 3 blocks before his pain begins. He can sit and stand for 30 minutes before his pain begins. In a day he sometimes has lie down because of pain. He currently uses no assistive walking device.\par \par PRIOR PAIN TREATMENTS: Surgery by Dr. Henderson in 1996 gave him moderate relief.\par \par PRIOR PAIN MEDICATIONS: Tylenol, cyclobenzaprine, oxycodone, Percocet.\par \par PATIENT PRESENTS FOR A FOLLOW UP: He presents with ongoing chronic cervical and lumbar pain radiating into the upper extremities related to a 1995 work injury. He was provided with moderate relief from previous caudal epidural steroid injection on 8/11/2022. Today, he is s/p a 2nd caudal ADAL w/ MAC on 10/06/22 which provided him with 50% relief. The leg pain has decreased but he continues to experience numbness of the lower extremities. He is aware that we can repeat the injection for a 3rd. He is hesitant to move forward at this time and would like to hold off until his pain returns to baseline.\par \par Patient continues to treat with oxycodone 15 mg q 8 hr, cyclobenzaprine, and hydroxyzine with some relief. No adverse effects or side effects noted.

## 2022-11-01 ENCOUNTER — APPOINTMENT (OUTPATIENT)
Dept: NEUROSURGERY | Facility: CLINIC | Age: 77
End: 2022-11-01

## 2022-11-01 PROCEDURE — 99214 OFFICE O/P EST MOD 30 MIN: CPT

## 2022-11-01 PROCEDURE — 99072 ADDL SUPL MATRL&STAF TM PHE: CPT

## 2022-11-01 NOTE — DISCUSSION/SUMMARY
[de-identified] : Mr. Irizarry is a long term patient of ours. He was last seen in 2018. Patient presents today with continued lower back pain which he has been having ongoing for since his work related accident in 1995. Lumbar Myelogram was reviewed. Impression shows severe L4-5 spinal stenosis and moderate to severe L3-4 spinal stenosis. Based on this, combined with his worsening symptoms, patient is a candidate for L3-4 and L4-5 decompressive laminectomy with interlaminar stabilization. The risks of the surgery were discussed including but not limited to failure to improve, pseudoarthrosis, paralysis, instrumentation failure, suboptimal placement of the hardware requiring revision surgery, spinal fluid leak, wound infections, chronic neuropathy/pain, anesthesia complications, postoperative weakness, and the potential need for further surgical intervention. The patient understood our discussion well and wishes to proceed at this time.\par \par Instructions: Scribe / CW\par AUREA, Bhanu Nation, attest that this documentation has been prepared under the direction and in the presence of Provider Chung Henderson MD. \par \par Thank you for allowing me to assist in the care of this patient. \par \par Chung Henderson MD.

## 2022-11-01 NOTE — HISTORY OF PRESENT ILLNESS
[de-identified] : Mr. Irizarry is a long term patient of ours. He was last seen in 2018. He is status post cervical fusion, C7 corpectomy with C3-4 ACDF in 2016. Prior to that he was fused from C4-C6. Patient presents today with continued lower back pain which he has been having ongoing for since his work related accident 1995. He is s/p a lumbar caudal injection 08/11/22 with over 50% relief of his lower back and lower extremity pain. Unfortunately some lower back pain still persists. Patient also has a primary complaint of lower extremity numbness and tingling. Patient has been having progressive urinary retention. \par \par He has a history of a right carpal tunnel release and right ulnar nerve transposition in 2014. \par \par CT Myelogram 10/2022: Severe L4-5 spinal stenosis, Lack of contrast opacification of the thecal sac due to stenosis. Moderate to severe L3-4 spinal stenosis.

## 2022-11-01 NOTE — PHYSICAL EXAM
[de-identified] : Examination of the spine is as follows: \par Inspection: incisions clean and dry, skin intact, no atrophy, no erythema, no ecchymosis, no palpable masses, no swelling, no sign of infection, no induration. \par Palpation (Bilateral): lumbar paraspinal spasm, lumbar paraspinal tenderness, sciatic nerve tenderness, numbness/tingling of leg, numbness/tingling of foot, but no thoracic paraspinal spasm. \par ROM: diminished ROM in all planes. \par Strength: motor exam is non-focal throughout both lower extremities. \par Testing (Bilateral): positive sitting straight leg raise

## 2022-11-03 ENCOUNTER — APPOINTMENT (OUTPATIENT)
Dept: UROLOGY | Facility: CLINIC | Age: 77
End: 2022-11-03

## 2022-11-04 RX ORDER — TESTOSTERONE 75 MG/1
75 PELLET SUBCUTANEOUS
Qty: 1 | Refills: 0 | Status: ACTIVE | OUTPATIENT

## 2022-12-01 ENCOUNTER — OUTPATIENT (OUTPATIENT)
Dept: OUTPATIENT SERVICES | Facility: HOSPITAL | Age: 77
LOS: 1 days | Discharge: HOME | End: 2022-12-01

## 2022-12-01 ENCOUNTER — APPOINTMENT (OUTPATIENT)
Dept: PAIN MANAGEMENT | Facility: CLINIC | Age: 77
End: 2022-12-01

## 2022-12-01 ENCOUNTER — RESULT REVIEW (OUTPATIENT)
Age: 77
End: 2022-12-01

## 2022-12-01 VITALS
WEIGHT: 250 LBS | DIASTOLIC BLOOD PRESSURE: 88 MMHG | HEART RATE: 76 BPM | HEIGHT: 70 IN | OXYGEN SATURATION: 97 % | TEMPERATURE: 97 F | RESPIRATION RATE: 16 BRPM | SYSTOLIC BLOOD PRESSURE: 140 MMHG

## 2022-12-01 VITALS
SYSTOLIC BLOOD PRESSURE: 143 MMHG | WEIGHT: 240 LBS | BODY MASS INDEX: 34.36 KG/M2 | DIASTOLIC BLOOD PRESSURE: 82 MMHG | HEART RATE: 91 BPM | HEIGHT: 70 IN

## 2022-12-01 DIAGNOSIS — I25.10 ATHEROSCLEROTIC HEART DISEASE OF NATIVE CORONARY ARTERY WITHOUT ANGINA PECTORIS: Chronic | ICD-10-CM

## 2022-12-01 DIAGNOSIS — Z90.89 ACQUIRED ABSENCE OF OTHER ORGANS: Chronic | ICD-10-CM

## 2022-12-01 DIAGNOSIS — Z01.818 ENCOUNTER FOR OTHER PREPROCEDURAL EXAMINATION: ICD-10-CM

## 2022-12-01 DIAGNOSIS — M54.16 RADICULOPATHY, LUMBAR REGION: ICD-10-CM

## 2022-12-01 DIAGNOSIS — Z95.810 PRESENCE OF AUTOMATIC (IMPLANTABLE) CARDIAC DEFIBRILLATOR: Chronic | ICD-10-CM

## 2022-12-01 LAB
A1C WITH ESTIMATED AVERAGE GLUCOSE RESULT: 5.4 % — SIGNIFICANT CHANGE UP (ref 4–5.6)
ALBUMIN SERPL ELPH-MCNC: 4.3 G/DL — SIGNIFICANT CHANGE UP (ref 3.5–5.2)
ALP SERPL-CCNC: 103 U/L — SIGNIFICANT CHANGE UP (ref 30–115)
ALT FLD-CCNC: 42 U/L — HIGH (ref 0–41)
ANION GAP SERPL CALC-SCNC: 12 MMOL/L — SIGNIFICANT CHANGE UP (ref 7–14)
APTT BLD: 27.4 SEC — SIGNIFICANT CHANGE UP (ref 27–39.2)
AST SERPL-CCNC: 34 U/L — SIGNIFICANT CHANGE UP (ref 0–41)
BASOPHILS # BLD AUTO: 0.09 K/UL — SIGNIFICANT CHANGE UP (ref 0–0.2)
BASOPHILS NFR BLD AUTO: 1.3 % — HIGH (ref 0–1)
BILIRUB SERPL-MCNC: 0.7 MG/DL — SIGNIFICANT CHANGE UP (ref 0.2–1.2)
BLD GP AB SCN SERPL QL: SIGNIFICANT CHANGE UP
BUN SERPL-MCNC: 12 MG/DL — SIGNIFICANT CHANGE UP (ref 10–20)
CALCIUM SERPL-MCNC: 9.6 MG/DL — SIGNIFICANT CHANGE UP (ref 8.4–10.5)
CHLORIDE SERPL-SCNC: 103 MMOL/L — SIGNIFICANT CHANGE UP (ref 98–110)
CO2 SERPL-SCNC: 23 MMOL/L — SIGNIFICANT CHANGE UP (ref 17–32)
CREAT SERPL-MCNC: 0.8 MG/DL — SIGNIFICANT CHANGE UP (ref 0.7–1.5)
EGFR: 92 ML/MIN/1.73M2 — SIGNIFICANT CHANGE UP
EOSINOPHIL # BLD AUTO: 0.2 K/UL — SIGNIFICANT CHANGE UP (ref 0–0.7)
EOSINOPHIL NFR BLD AUTO: 2.9 % — SIGNIFICANT CHANGE UP (ref 0–8)
ESTIMATED AVERAGE GLUCOSE: 108 MG/DL — SIGNIFICANT CHANGE UP (ref 68–114)
GLUCOSE SERPL-MCNC: 84 MG/DL — SIGNIFICANT CHANGE UP (ref 70–99)
HCT VFR BLD CALC: 37.8 % — LOW (ref 42–52)
HGB BLD-MCNC: 13.1 G/DL — LOW (ref 14–18)
IMM GRANULOCYTES NFR BLD AUTO: 0.6 % — HIGH (ref 0.1–0.3)
INR BLD: 1.05 RATIO — SIGNIFICANT CHANGE UP (ref 0.65–1.3)
LYMPHOCYTES # BLD AUTO: 1.36 K/UL — SIGNIFICANT CHANGE UP (ref 1.2–3.4)
LYMPHOCYTES # BLD AUTO: 19.9 % — LOW (ref 20.5–51.1)
MCHC RBC-ENTMCNC: 32.3 PG — HIGH (ref 27–31)
MCHC RBC-ENTMCNC: 34.7 G/DL — SIGNIFICANT CHANGE UP (ref 32–37)
MCV RBC AUTO: 93.3 FL — SIGNIFICANT CHANGE UP (ref 80–94)
MONOCYTES # BLD AUTO: 0.79 K/UL — HIGH (ref 0.1–0.6)
MONOCYTES NFR BLD AUTO: 11.5 % — HIGH (ref 1.7–9.3)
NEUTROPHILS # BLD AUTO: 4.37 K/UL — SIGNIFICANT CHANGE UP (ref 1.4–6.5)
NEUTROPHILS NFR BLD AUTO: 63.8 % — SIGNIFICANT CHANGE UP (ref 42.2–75.2)
NRBC # BLD: 0 /100 WBCS — SIGNIFICANT CHANGE UP (ref 0–0)
PLATELET # BLD AUTO: 188 K/UL — SIGNIFICANT CHANGE UP (ref 130–400)
POTASSIUM SERPL-MCNC: 4 MMOL/L — SIGNIFICANT CHANGE UP (ref 3.5–5)
POTASSIUM SERPL-SCNC: 4 MMOL/L — SIGNIFICANT CHANGE UP (ref 3.5–5)
PROT SERPL-MCNC: 6.7 G/DL — SIGNIFICANT CHANGE UP (ref 6–8)
PROTHROM AB SERPL-ACNC: 12 SEC — SIGNIFICANT CHANGE UP (ref 9.95–12.87)
RBC # BLD: 4.05 M/UL — LOW (ref 4.7–6.1)
RBC # FLD: 13.8 % — SIGNIFICANT CHANGE UP (ref 11.5–14.5)
SODIUM SERPL-SCNC: 138 MMOL/L — SIGNIFICANT CHANGE UP (ref 135–146)
WBC # BLD: 6.85 K/UL — SIGNIFICANT CHANGE UP (ref 4.8–10.8)
WBC # FLD AUTO: 6.85 K/UL — SIGNIFICANT CHANGE UP (ref 4.8–10.8)

## 2022-12-01 PROCEDURE — 93010 ELECTROCARDIOGRAM REPORT: CPT

## 2022-12-01 PROCEDURE — 71046 X-RAY EXAM CHEST 2 VIEWS: CPT | Mod: 26

## 2022-12-01 PROCEDURE — 99072 ADDL SUPL MATRL&STAF TM PHE: CPT

## 2022-12-01 PROCEDURE — 99214 OFFICE O/P EST MOD 30 MIN: CPT

## 2022-12-01 RX ORDER — RABEPRAZOLE 20 MG/1
0 TABLET, DELAYED RELEASE ORAL
Qty: 0 | Refills: 0 | DISCHARGE

## 2022-12-01 RX ORDER — IVABRADINE 7.5 MG/1
0 TABLET, FILM COATED ORAL
Qty: 0 | Refills: 0 | DISCHARGE

## 2022-12-01 NOTE — H&P PST ADULT - NSICDXPASTMEDICALHX_GEN_ALL_CORE_FT
PAST MEDICAL HISTORY:  AICD (automatic cardioverter/defibrillator) present     Back pain     Erectile dysfunction     GERD (gastroesophageal reflux disease)     High cholesterol     HTN (hypertension)     Myocardial infarct 10 years ago

## 2022-12-01 NOTE — H&P PST ADULT - HISTORY OF PRESENT ILLNESS
76 Y/O MALE PRESENTS TO PAST WITH HX BACK PAIN, PT C/O                PT NOW FOR SCHEDULED PROCEDURE ( L4-5 LAMINECTOMY WITH STABILAZATION) . PT DENIES ANY CP SOB PALP COUGH DYSURIA FEVER URI. PT ABLE TO MARCOS 1-2 FOS W/O SOB  pt denies any covid s/s, or tested positive in the past  pt advised self quarantine till day of procedure  Anesthesia Alert  NO--Difficult Airway  NO--History of neck surgery or radiation  NO--Limited ROM of neck  NO--History of Malignant hyperthermia  NO--Personal or family history of Pseudocholinesterase deficiency.  NO--Prior Anesthesia Complication  NO--Latex Allergy  NO--Loose teeth  NO--History of Rheumatoid Arthritis  NO--CHICO  NO--Bleeding risk  NO--Other_____     78 Y/O MALE PRESENTS TO PAST WITH HX BACK PAIN, PT C/O INCREASED PAIN, SHARP, , NUMBNESS  B/L LE PAST 6 MO  PT NOW FOR SCHEDULED PROCEDURE ( L4-5 LAMINECTOMY WITH STABILAZATION) . PT DENIES ANY CP SOB PALP COUGH DYSURIA FEVER URI. PT ABLE TO MARCOS 1 BLOCK  W/O SOB, LIMITED SECONDARY TO PAIN   pt denies any covid s/s, or tested positive in the past  pt advised self quarantine till day of procedure  Anesthesia Alert  NO--Difficult Airway  NO--History of neck surgery or radiation  NO--Limited ROM of neck  NO--History of Malignant hyperthermia  NO--Personal or family history of Pseudocholinesterase deficiency.  NO--Prior Anesthesia Complication  NO--Latex Allergy  NO--Loose teeth  NO--History of Rheumatoid Arthritis  NO--CHICO  NO--Bleeding risk  NO--Other_____     78 Y/O MALE PRESENTS TO PAST WITH HX BACK PAIN, PT C/O INCREASED PAIN, SHARP, , NUMBNESS  B/L LE PAST 6 MO  PT NOW FOR SCHEDULED PROCEDURE ( L4-5 LAMINECTOMY WITH STABILAZATION) . PT DENIES ANY CP SOB PALP COUGH DYSURIA FEVER URI. PT ABLE TO MARCOS 1 BLOCK  W/O SOB, LIMITED SECONDARY TO PAIN   pt denies any covid s/s, or tested positive in the past  pt advised self quarantine till day of procedure  Anesthesia Alert  NO--Difficult Airway  NO--History of neck surgery or radiation  NO--Limited ROM of neck  NO--History of Malignant hyperthermia  NO--Personal or family history of Pseudocholinesterase deficiency.  NO--Prior Anesthesia Complication  NO--Latex Allergy  NO--Loose teeth  NO--History of Rheumatoid Arthritis  NO--CHICO  NO--Bleeding risk  NO--Other_____  Opioid Risk Assessment Tool (Male)       Family history of substance abuse            Alcohol (3)            Illegal Drugs (3)            Prescription drugs (4)       Personal history of substance abuse            Alcohol (3)            Illegal Drugs (4)            Prescription drugs (5)       Age between 16-45 (1)       History of preadolescent sexual abuse (0)       Psychological disease (ADD, ADHD, OCD, Bipolar Disorder, Schizophrenia, Depression) (1)    Scoring Totals:  Low Risk (0-3)  Moderate Risk (4-7)  High Risk (>/=8)  0

## 2022-12-01 NOTE — H&P PST ADULT - WEIGHT IN KG
Last OV was 10/28/20  Last filled on 10/22/19 # 180 with four refills.
Pt need a refill on this medication. Please send to [address] [place] [address] drug store.      carvedilol (COREG) 6.25 mg tablet    Thanks
113.4

## 2022-12-01 NOTE — HISTORY OF PRESENT ILLNESS
[FreeTextEntry1] : ORIGINAL PRESENTATION: Mr. Irizarry is a 77-year-old male who continues to follow up with us for chronic cervical and lumbar pain radiating into the upper extremities related to a 1995 work injury. He has undergone cervical fusion without significant improvement. Medication management has continued as well as intermittent interventional procedures in the form of trigger point injections as well as cervical epidural procedures. He remains content with his response to medication management in the form of oxycodone IR which is consumed up to three times daily.\par \par He states his pain is of undetermined origin, moderate to severe, constant, in no typical pattern. He states it is pressure-like, dull aching, and throbbing. He denies any weakness in the upper lower extremities. He states that lying down standing sitting walking and exercise increases his pain. Denies any recent change in bowel or bladder habits.\par \par ACTIVITIES: He states he can walk 3 blocks before his pain begins. He can sit and stand for 30 minutes before his pain begins. In a day he sometimes has lie down because of pain. He currently uses no assistive walking device.\par \par PRIOR PAIN TREATMENTS: Surgery by Dr. Henderson in 1996 gave him moderate relief.\par \par PRIOR PAIN MEDICATIONS: Tylenol, cyclobenzaprine, oxycodone, Percocet.\par \par PATIENT PRESENTS FOR A FOLLOW UP: He presents with ongoing chronic cervical and lumbar pain radiating into the upper extremities related to a 1995 work injury. He was provided with moderate relief from previous caudal epidural steroid injection on 8/11/2022. He is s/p a 2nd caudal ADAL w/ MAC on 10/06/22 which provided him with 50% relief. The leg pain has decreased but he continues to experience numbness of the lower extremities.Patient continues to treat with oxycodone 15 mg q 8 hr, cyclobenzaprine, and hydroxyzine with some relief. No adverse effects or side effects noted. Of note, he is scheduled for surgery for a lumbar decompressive laminectomy on 12/22/22 with Dr. Henderson.

## 2022-12-01 NOTE — PHYSICAL EXAM
[Normal Coordination] : normal coordination [Normal DTR UE/LE] : normal DTR UE/LE  [Normal Sensation] : normal sensation [Normal Mood and Affect] : normal mood and affect [Orientated] : orientated [Able to Communicate] : able to communicate [Well Developed] : well developed [Well Nourished] : well nourished [de-identified] : Examination of the neck is as follows: \par Inspection: no scars, no erythema, no ecchymosis, no palpable masses. \par Palpation (Bilateral): paracervical spasm, paracervical tenderness, but no trapezial spasm, no trapezial tenderness, no rhomboid spasm, no rhomboid tenderness. \par ROM: diminished ROM in all planes. Pain at extremes of: flexion, extension, rotation to left, rotation to right. Pain radiates to left arm with motion. Numbness radiates to left arm with motion. Pain radiates to right arm with motion. Numbness radiates to right arm with motion. \par Strength: motor exam is non-focal throughout both upper extremities. \par \par Examination of the spine is as follows: \par Inspection: incisions clean and dry, skin intact, no atrophy, no erythema, no ecchymosis, no palpable masses, no swelling, no sign of infection, no induration. \par Palpation (Bilateral): lumbar paraspinal spasm, lumbar paraspinal tenderness, sciatic nerve tenderness, numbness/tingling of leg, numbness/tingling of foot, but no thoracic paraspinal spasm. \par ROM: diminished ROM in all planes. \par Strength: motor exam is non-focal throughout both lower extremities. \par Testing (Bilateral): positive sitting straight leg raise. \par \par Gait: mildly antalgic,   Does not use an assistive device.

## 2022-12-01 NOTE — DATA REVIEWED
[FreeTextEntry1] : 12/2020: CT L spine: moderate-severe lumbar stenosis, L4-5. Severe spinal stenosis along with foraminal narrowing as well at that site.\par \par SOAPP: moderate on 9/22/22\par Patient has a combination of moderate rise SOAP and no risk factors. UDS would be repeated randomly every quarter.\par \par UDS: 5/16/22 + oxycodone, cotinine.  \par \par [place] REGISTRY: Checked\par

## 2022-12-02 LAB — MRSA PCR RESULT.: NEGATIVE — SIGNIFICANT CHANGE UP

## 2022-12-06 ENCOUNTER — APPOINTMENT (OUTPATIENT)
Dept: UROLOGY | Facility: CLINIC | Age: 77
End: 2022-12-06
Payer: MEDICARE

## 2022-12-06 VITALS
OXYGEN SATURATION: 99 % | BODY MASS INDEX: 34.36 KG/M2 | DIASTOLIC BLOOD PRESSURE: 79 MMHG | HEART RATE: 100 BPM | TEMPERATURE: 97.1 F | RESPIRATION RATE: 16 BRPM | HEIGHT: 70 IN | SYSTOLIC BLOOD PRESSURE: 131 MMHG | WEIGHT: 240 LBS

## 2022-12-06 DIAGNOSIS — N39.0 URINARY TRACT INFECTION, SITE NOT SPECIFIED: ICD-10-CM

## 2022-12-06 DIAGNOSIS — A49.9 URINARY TRACT INFECTION, SITE NOT SPECIFIED: ICD-10-CM

## 2022-12-06 PROCEDURE — 11980 IMPLANT HORMONE PELLET(S): CPT

## 2022-12-06 PROCEDURE — 99214 OFFICE O/P EST MOD 30 MIN: CPT | Mod: 25

## 2022-12-06 NOTE — LETTER BODY
[Dear  ___] : Dear  [unfilled], [Courtesy Letter:] : I had the pleasure of seeing your patient, [unfilled], in my office today. [Please see my note below.] : Please see my note below. [Sincerely,] : Sincerely, [FreeTextEntry2] : Aamir Woods MD\par [address] [place] [address]\[place] [address] \par

## 2022-12-06 NOTE — ASSESSMENT
[FreeTextEntry1] : His labs were borderline about 2 months ago he is due for his next dose we will give him 6 pellets and get blood in 3 and half months following up in 4

## 2022-12-06 NOTE — PHYSICAL EXAM
[Abdomen Soft] : soft [Abdomen Tenderness] : non-tender [Costovertebral Angle Tenderness] : no ~M costovertebral angle tenderness [Heart Rate And Rhythm] : Heart rate and rhythm were normal [] : no respiratory distress [Respiration, Rhythm And Depth] : normal respiratory rhythm and effort [Exaggerated Use Of Accessory Muscles For Inspiration] : no accessory muscle use [Auscultation Breath Sounds / Voice Sounds] : lungs were clear to auscultation bilaterally [Oriented To Time, Place, And Person] : oriented to person, place, and time [Affect] : the affect was normal [Mood] : the mood was normal [Not Anxious] : not anxious [FreeTextEntry1] : Progressive weakness of his right upper extremity

## 2022-12-06 NOTE — LETTER HEADER
[FreeTextEntry3] : Mohan Hernández M.D.\par Director Emeritus of Urology\par [facility]\par [address] [place] [address], [address]\[place] [address]

## 2022-12-06 NOTE — HISTORY OF PRESENT ILLNESS
[Currently Experiencing ___] :  [unfilled] [FreeTextEntry1] : Jose Roberto is a 77-year-old male born July 2, 1945 last seen by me on July 11, 2022 seen by Dr. Canales for post prosthesis general follow-up on September 23, 2022 he was supposed to come in sooner but his plan started having trouble covering it for Testopel we had to arrange to get it through his pharmaceutical plan.  It came in he came in today to have the dose.  The last blood test were done in October please see below.  He tells me he is feeling extremely fatigued with low libido.\par \par Please note he has chronic pain is being followed by pain management as well as neurosurgery and I am not sure what part that contributes to his decreased libido.  However he tells me that while he does have pain he still wants to persist in having sexual activity.  He is able to have sex but that is more per his partner's benefit as to him and feels like Strap on prosthesis.  When he has testosterone on board he tends to be able to reach orgasm though it can take up\par .\par note says no further dysuria

## 2022-12-12 ENCOUNTER — OUTPATIENT (OUTPATIENT)
Dept: OUTPATIENT SERVICES | Facility: HOSPITAL | Age: 77
LOS: 1 days | Discharge: HOME | End: 2022-12-12

## 2022-12-12 DIAGNOSIS — I25.10 ATHEROSCLEROTIC HEART DISEASE OF NATIVE CORONARY ARTERY WITHOUT ANGINA PECTORIS: Chronic | ICD-10-CM

## 2022-12-12 DIAGNOSIS — Z95.810 PRESENCE OF AUTOMATIC (IMPLANTABLE) CARDIAC DEFIBRILLATOR: Chronic | ICD-10-CM

## 2022-12-12 DIAGNOSIS — R07.9 CHEST PAIN, UNSPECIFIED: ICD-10-CM

## 2022-12-12 DIAGNOSIS — Z90.89 ACQUIRED ABSENCE OF OTHER ORGANS: Chronic | ICD-10-CM

## 2022-12-12 PROCEDURE — G1004: CPT

## 2022-12-12 PROCEDURE — 93018 CV STRESS TEST I&R ONLY: CPT

## 2022-12-12 PROCEDURE — 78452 HT MUSCLE IMAGE SPECT MULT: CPT | Mod: 26,ME

## 2022-12-12 PROCEDURE — 93016 CV STRESS TEST SUPVJ ONLY: CPT

## 2022-12-19 ENCOUNTER — LABORATORY RESULT (OUTPATIENT)
Age: 77
End: 2022-12-19

## 2022-12-20 ENCOUNTER — APPOINTMENT (OUTPATIENT)
Dept: PAIN MANAGEMENT | Facility: CLINIC | Age: 77
End: 2022-12-20

## 2022-12-20 DIAGNOSIS — Z98.61 CORONARY ANGIOPLASTY STATUS: ICD-10-CM

## 2022-12-20 DIAGNOSIS — Z86.79 PERSONAL HISTORY OF OTHER DISEASES OF THE CIRCULATORY SYSTEM: ICD-10-CM

## 2022-12-20 PROCEDURE — 99204 OFFICE O/P NEW MOD 45 MIN: CPT | Mod: 95

## 2022-12-20 NOTE — PHYSICAL EXAM
[de-identified] : Gen: NAD\par HEENT: NC/AT, no glasses\par CV: no JVD\par Resp: nonlabored breathing\par Abd: nondistended\par Ext: full ROM\par Neuro: CN intact\par Psych: normal affect\par

## 2022-12-20 NOTE — HISTORY OF PRESENT ILLNESS
[FreeTextEntry1] : Patient is a 78 y/o man with history of CAD s/p ENIO, AICD, HTN, HL, PUD/GI bleed, and chronic low back pain who is scheduled to undergo an L3-L4, L4-L5 decompression with interlaminar stabilization on 12/22/2022. The patient has a longstanding history of low back pain after a workplace accident in 1995. He has undergone multiple surgeries/fusions of his cervical spine and has been on chronic opioid therapy for the past 3-4 years. Currently, the patient takes oxycodone IR 15mg 3x/day prn. He denies any side effects with this medication. At this point, the patient endorses having an aching pain across the low lumbar region that is worse with standing and walking.

## 2022-12-20 NOTE — DISCUSSION/SUMMARY
[de-identified] : Patient is a 76 y/o man with history of CAD s/p ENIO, AICD, HTN, HL, PUD/GI bleed, and chronic low back pain who is scheduled to undergo an L3, L4, L4-L5 decompression with interlaminar stabilization on 12/22/2022.\par \par Patient is on oxycodone 15mg 3x/day prn (MED = 67.5), likely to have some opioid tolerance.\par \par No neuromonitoring planned.\par \par Anesthetic plan:\par 1) GA/ETT\par 2) Consider video laryngoscopy with in-line stabilization (previous cervical fusion x2)\par 3) Ketamine 0.5mg/kg x2 doses\par 4) Consider dexmedetomidine infusion\par 5) Hydromorphone IV ~3mg during the procedure\par \par Postoperative pain:\par 1) Consider hydromorphone PCA 0/0.4/10\par 2) When writing for oral medications, may give oxycodone 15mg Q4h prn or hydromorphone PO 6mg Q4h prn\par 3) Acetaminophen 1000mg Q8h standing\par 4) Gabapentin 300mg TID\par 5) Methocarbamol 750mg Q8h standing

## 2022-12-20 NOTE — REASON FOR VISIT
[Initial Consultation] : an initial pain management consultation [FreeTextEntry2] : perioperative pain planning

## 2022-12-21 NOTE — ASU PATIENT PROFILE, ADULT - FALL HARM RISK - UNIVERSAL INTERVENTIONS
Bed in lowest position, wheels locked, appropriate side rails in place/Call bell, personal items and telephone in reach/Instruct patient to call for assistance before getting out of bed or chair/Non-slip footwear when patient is out of bed/[place] to call system/Physically safe environment - no spills, clutter or unnecessary equipment/Purposeful Proactive Rounding/Room/bathroom lighting operational, light cord in reach

## 2022-12-22 ENCOUNTER — INPATIENT (INPATIENT)
Facility: HOSPITAL | Age: 77
LOS: 0 days | Discharge: HOME | End: 2022-12-23
Attending: NEUROLOGICAL SURGERY | Admitting: NEUROLOGICAL SURGERY
Payer: MEDICARE

## 2022-12-22 ENCOUNTER — TRANSCRIPTION ENCOUNTER (OUTPATIENT)
Age: 77
End: 2022-12-22

## 2022-12-22 VITALS
WEIGHT: 250 LBS | DIASTOLIC BLOOD PRESSURE: 68 MMHG | TEMPERATURE: 99 F | HEIGHT: 70 IN | HEART RATE: 95 BPM | SYSTOLIC BLOOD PRESSURE: 115 MMHG | OXYGEN SATURATION: 96 % | RESPIRATION RATE: 15 BRPM

## 2022-12-22 DIAGNOSIS — Z95.810 PRESENCE OF AUTOMATIC (IMPLANTABLE) CARDIAC DEFIBRILLATOR: Chronic | ICD-10-CM

## 2022-12-22 DIAGNOSIS — Z90.89 ACQUIRED ABSENCE OF OTHER ORGANS: Chronic | ICD-10-CM

## 2022-12-22 DIAGNOSIS — I25.10 ATHEROSCLEROTIC HEART DISEASE OF NATIVE CORONARY ARTERY WITHOUT ANGINA PECTORIS: Chronic | ICD-10-CM

## 2022-12-22 LAB
ALBUMIN SERPL ELPH-MCNC: 3.6 G/DL — SIGNIFICANT CHANGE UP (ref 3.5–5.2)
ALP SERPL-CCNC: 105 U/L — SIGNIFICANT CHANGE UP (ref 30–115)
ALT FLD-CCNC: 25 U/L — SIGNIFICANT CHANGE UP (ref 0–41)
ANION GAP SERPL CALC-SCNC: 13 MMOL/L — SIGNIFICANT CHANGE UP (ref 7–14)
AST SERPL-CCNC: 27 U/L — SIGNIFICANT CHANGE UP (ref 0–41)
BILIRUB SERPL-MCNC: 0.6 MG/DL — SIGNIFICANT CHANGE UP (ref 0.2–1.2)
BLD GP AB SCN SERPL QL: SIGNIFICANT CHANGE UP
BUN SERPL-MCNC: 12 MG/DL — SIGNIFICANT CHANGE UP (ref 10–20)
CALCIUM SERPL-MCNC: 8.2 MG/DL — LOW (ref 8.4–10.5)
CHLORIDE SERPL-SCNC: 107 MMOL/L — SIGNIFICANT CHANGE UP (ref 98–110)
CO2 SERPL-SCNC: 21 MMOL/L — SIGNIFICANT CHANGE UP (ref 17–32)
CREAT SERPL-MCNC: 0.9 MG/DL — SIGNIFICANT CHANGE UP (ref 0.7–1.5)
EGFR: 89 ML/MIN/1.73M2 — SIGNIFICANT CHANGE UP
GLUCOSE SERPL-MCNC: 169 MG/DL — HIGH (ref 70–99)
HCT VFR BLD CALC: 34.6 % — LOW (ref 42–52)
HGB BLD-MCNC: 11.5 G/DL — LOW (ref 14–18)
MCHC RBC-ENTMCNC: 31.3 PG — HIGH (ref 27–31)
MCHC RBC-ENTMCNC: 33.2 G/DL — SIGNIFICANT CHANGE UP (ref 32–37)
MCV RBC AUTO: 94.3 FL — HIGH (ref 80–94)
NRBC # BLD: 0 /100 WBCS — SIGNIFICANT CHANGE UP (ref 0–0)
PLATELET # BLD AUTO: 201 K/UL — SIGNIFICANT CHANGE UP (ref 130–400)
POTASSIUM SERPL-MCNC: 4.4 MMOL/L — SIGNIFICANT CHANGE UP (ref 3.5–5)
POTASSIUM SERPL-SCNC: 4.4 MMOL/L — SIGNIFICANT CHANGE UP (ref 3.5–5)
PROT SERPL-MCNC: 5.5 G/DL — LOW (ref 6–8)
RBC # BLD: 3.67 M/UL — LOW (ref 4.7–6.1)
RBC # FLD: 13.2 % — SIGNIFICANT CHANGE UP (ref 11.5–14.5)
SODIUM SERPL-SCNC: 141 MMOL/L — SIGNIFICANT CHANGE UP (ref 135–146)
WBC # BLD: 8.21 K/UL — SIGNIFICANT CHANGE UP (ref 4.8–10.8)
WBC # FLD AUTO: 8.21 K/UL — SIGNIFICANT CHANGE UP (ref 4.8–10.8)

## 2022-12-22 PROCEDURE — 22868 INSJ STABLJ DEV W/DCMPRN: CPT | Mod: 59

## 2022-12-22 PROCEDURE — 93010 ELECTROCARDIOGRAM REPORT: CPT

## 2022-12-22 PROCEDURE — 63005 REMOVE SPINE LAMINA 1/2 LMBR: CPT

## 2022-12-22 PROCEDURE — 99221 1ST HOSP IP/OBS SF/LOW 40: CPT

## 2022-12-22 PROCEDURE — 22867 INSJ STABLJ DEV W/DCMPRN: CPT | Mod: 59

## 2022-12-22 RX ORDER — METHOCARBAMOL 500 MG/1
750 TABLET, FILM COATED ORAL EVERY 8 HOURS
Refills: 0 | Status: DISCONTINUED | OUTPATIENT
Start: 2022-12-22 | End: 2022-12-23

## 2022-12-22 RX ORDER — HYDROMORPHONE HYDROCHLORIDE 2 MG/ML
1 INJECTION INTRAMUSCULAR; INTRAVENOUS; SUBCUTANEOUS
Refills: 0 | Status: DISCONTINUED | OUTPATIENT
Start: 2022-12-22 | End: 2022-12-22

## 2022-12-22 RX ORDER — SENNA PLUS 8.6 MG/1
2 TABLET ORAL AT BEDTIME
Refills: 0 | Status: DISCONTINUED | OUTPATIENT
Start: 2022-12-22 | End: 2022-12-23

## 2022-12-22 RX ORDER — HYDROMORPHONE HYDROCHLORIDE 2 MG/ML
1 INJECTION INTRAMUSCULAR; INTRAVENOUS; SUBCUTANEOUS EVERY 6 HOURS
Refills: 0 | Status: DISCONTINUED | OUTPATIENT
Start: 2022-12-22 | End: 2022-12-23

## 2022-12-22 RX ORDER — ACETAMINOPHEN 500 MG
1000 TABLET ORAL ONCE
Refills: 0 | Status: COMPLETED | OUTPATIENT
Start: 2022-12-22 | End: 2022-12-22

## 2022-12-22 RX ORDER — DIPHENHYDRAMINE HCL 50 MG
25 CAPSULE ORAL EVERY 4 HOURS
Refills: 0 | Status: DISCONTINUED | OUTPATIENT
Start: 2022-12-22 | End: 2022-12-23

## 2022-12-22 RX ORDER — HYDROMORPHONE HYDROCHLORIDE 2 MG/ML
0.5 INJECTION INTRAMUSCULAR; INTRAVENOUS; SUBCUTANEOUS
Refills: 0 | Status: DISCONTINUED | OUTPATIENT
Start: 2022-12-22 | End: 2022-12-22

## 2022-12-22 RX ORDER — HYDROXYZINE HCL 10 MG
25 TABLET ORAL DAILY
Refills: 0 | Status: DISCONTINUED | OUTPATIENT
Start: 2022-12-22 | End: 2022-12-23

## 2022-12-22 RX ORDER — ONDANSETRON 8 MG/1
4 TABLET, FILM COATED ORAL ONCE
Refills: 0 | Status: DISCONTINUED | OUTPATIENT
Start: 2022-12-22 | End: 2022-12-22

## 2022-12-22 RX ORDER — SODIUM CHLORIDE 9 MG/ML
1000 INJECTION INTRAMUSCULAR; INTRAVENOUS; SUBCUTANEOUS
Refills: 0 | Status: DISCONTINUED | OUTPATIENT
Start: 2022-12-22 | End: 2022-12-23

## 2022-12-22 RX ORDER — OXYCODONE HYDROCHLORIDE 5 MG/1
15 TABLET ORAL EVERY 6 HOURS
Refills: 0 | Status: DISCONTINUED | OUTPATIENT
Start: 2022-12-22 | End: 2022-12-23

## 2022-12-22 RX ORDER — ACETAMINOPHEN 500 MG
1000 TABLET ORAL ONCE
Refills: 0 | Status: DISCONTINUED | OUTPATIENT
Start: 2022-12-22 | End: 2022-12-22

## 2022-12-22 RX ORDER — PANTOPRAZOLE SODIUM 20 MG/1
40 TABLET, DELAYED RELEASE ORAL DAILY
Refills: 0 | Status: DISCONTINUED | OUTPATIENT
Start: 2022-12-22 | End: 2022-12-23

## 2022-12-22 RX ORDER — SODIUM CHLORIDE 9 MG/ML
1000 INJECTION, SOLUTION INTRAVENOUS
Refills: 0 | Status: DISCONTINUED | OUTPATIENT
Start: 2022-12-22 | End: 2022-12-22

## 2022-12-22 RX ORDER — ONDANSETRON 8 MG/1
4 TABLET, FILM COATED ORAL EVERY 6 HOURS
Refills: 0 | Status: DISCONTINUED | OUTPATIENT
Start: 2022-12-22 | End: 2022-12-23

## 2022-12-22 RX ORDER — ACETAMINOPHEN 500 MG
650 TABLET ORAL EVERY 6 HOURS
Refills: 0 | Status: DISCONTINUED | OUTPATIENT
Start: 2022-12-22 | End: 2022-12-23

## 2022-12-22 RX ORDER — ATORVASTATIN CALCIUM 80 MG/1
80 TABLET, FILM COATED ORAL AT BEDTIME
Refills: 0 | Status: DISCONTINUED | OUTPATIENT
Start: 2022-12-22 | End: 2022-12-23

## 2022-12-22 RX ADMIN — SODIUM CHLORIDE 75 MILLILITER(S): 9 INJECTION INTRAMUSCULAR; INTRAVENOUS; SUBCUTANEOUS at 14:17

## 2022-12-22 RX ADMIN — Medication 1000 MILLIGRAM(S): at 16:51

## 2022-12-22 RX ADMIN — ATORVASTATIN CALCIUM 80 MILLIGRAM(S): 80 TABLET, FILM COATED ORAL at 21:52

## 2022-12-22 RX ADMIN — SENNA PLUS 2 TABLET(S): 8.6 TABLET ORAL at 21:52

## 2022-12-22 RX ADMIN — Medication 400 MILLIGRAM(S): at 16:00

## 2022-12-22 RX ADMIN — METHOCARBAMOL 750 MILLIGRAM(S): 500 TABLET, FILM COATED ORAL at 21:52

## 2022-12-22 NOTE — CHART NOTE - NSCHARTNOTEFT_GEN_A_CORE
NEURO CRIT CONSULTATION NOTE - REQUESTED BY ANESTHESIA     Neuro crit team was called to evaluate patient s/p L1-L5 decompressive laminectomy with Dr. Bynum today. NCC team was called due to patient requiring low dose neosynephrine (max 0.3mcg) intraoperatively and extensive cardiac history as per anesthesia in which they felt patient may be good candidate for close monitoring in the NSICU. Neuro NP and Dr. Schwab went to assess patient, patient is off pressers at this time, VSS ( MAP > 65, HR 90s,), Neuro exam intact without any deficits. Post operative EKG was obtained which was unchanged from pre-operative. Patient also had cardiac work up prior to surgery and was cleared for surgery. Hypotension intraoperatively most likely due to sedation (propofol given in OR).     At this time, patient not deemed as candidate for NSICU. Discussed with Neurosurgery PA, recommended putting patient on tele monitored unit given patient's history.

## 2022-12-22 NOTE — CONSULT NOTE ADULT - SUBJECTIVE AND OBJECTIVE BOX
History of Present Illness  Patient is a 78 y/o man with history of CAD s/p ENIO, AICD, HTN, HL, PUD/GI bleed, and chronic low back pain who is scheduled to undergo an L3-L4, L4-L5 decompression with interlaminar stabilization on 12/22/2022. The patient has a longstanding history of low back pain after a workplace accident in 1995. He has undergone multiple surgeries/fusions of his cervical spine and has been on chronic opioid therapy for the past 3-4 years. Currently, the patient takes oxycodone IR 15mg 3x/day prn. He denies any side effects with this medication. At this point, the patient endorses having an aching pain across the low lumbar region that is worse with standing and walking.      Active Problems  Benign localized hyperplasia of prostate with urinary obstruction and lower urinary tract  symptoms (600.21,599.69) (N40.1,N13.9)  Cervicalgia (723.1) (M54.2)  Encounter for adjustment and management of other implanted devices (V53.99)  (Z45.89)  Erectile dysfunction (607.84) (N52.9)  Genital condyloma, male (078.11) (A63.0)  High blood pressure (401.9) (I10)  High cholesterol (272.0) (E78.00)  Injury of dorsal nerve roots, initial encounter (953.1) (S24.2XXA)  Low back pain (724.2) (M54.50)  Low libido (799.81) (R68.82)  Low testosterone level in male (790.99) (R79.89)  Lumbar radiculopathy (724.4) (M54.16)  Lumbar stenosis (724.02) (M48.061)  Orgasmic dysfunction  Other ejaculatory dysfunction (608.89) (N53.19)  Right arm weakness (729.89) (R29.898)  Spinal stenosis of lumbar region without neurogenic claudication (724.02) (M48.061)  Spinal stenosis of lumbar region without neurogenic claudication (724.02) (M48.061)  Thoracic back pain (724.1) (M54.6)    Past Medical History  History of Bacterial UTI (599.0,041.9) (N39.0,A49.9)  History of cardiac disorder (V12.50) (Z86.79)  History of coronary artery disease (V12.59) (Z86.79)  History of percutaneous coronary intervention (V15.1) (Z98.61)    Surgical History  History of Cardio-defib Pulse Generator Type  History of Carpal tunnel surgery  History of Complete Colonoscopy  History of Previous Stent Placement Total Number Performed 3  History of Spinal Arthrodesis  History of Surg Penis Inflatable Penile Prosthesis  History of Ulnar nerve transposition  History of Upper Gastrointestinal Endoscopy (Therapeutic)    Family History  Denied: Family history of malignant neoplasm    Social History  Alcohol use (V49.89) (Z78.9)  No illicit drug use  Smokes cigarettes (305.1) (F17.210)  Work history    Current Meds  Acetaminophen 325 MG Oral Tablet; TAKE 3 TABLET Every 6 hours  Aspirin 81 MG TABS  Celecoxib 200 MG Oral Capsule; TAKE 1 CAPSULE DAILY AS NEEDED  Cyclobenzaprine HCl - 10 MG Oral Tablet; TAKE 1 TABLET 3 TIMES DAILY AS NEEDED  Enalapril Maleate 10 MG Oral Tablet  hydrOXYzine HCl - 25 MG Oral Tablet; TAKE 1 TABLET BY MOUTH TWICE A DAY AS  NEEDED  Iron TABS  Lipitor 80 MG Oral Tablet  oxyCODONE HCl - 15 MG Oral Tablet; TAKE 1 TABLET Every 8 hours PRN immediate  release MDD:3tabs  oxyCODONE HCl - 5 MG Oral Tablet; TAKE 1 TABLET BY MOUTH 3 TIMES A DAY AS  NEEDED  Testopel 75 MG Implant Pellet; IMPLANT AS DIRECTED, with trochar MDD:10 pellets    Allergies  No Known Allergies    Review of Systems    Constitutional: no fever and no chills.   Cardiovascular:. no chest pain.   Musculoskeletal, Constitutional, Eyes, ENT, Cardiovascular, Respiratory, Gastrointestinal, Genitourinary, Integumentary, Neurological, Psychiatric, Endocrine and Heme/Lymph review of systems are otherwise negative except as noted in HPI.      Physical Exam    Gen: NAD  HEENT: NC/AT, no glasses  CV: no JVD  Resp: nonlabored breathing  Abd: nondistended  Ext: full ROM  Neuro: CN intact  Psych: normal affect        Discussion/Summary  Patient is a 78 y/o man with history of CAD s/p ENIO, AICD, HTN, HL, PUD/GI bleed, and chronic low back pain who is scheduled to undergo an L3, L4, L4-L5 decompression with interlaminar stabilization on 12/22/2022.    Patient is on oxycodone 15mg 3x/day prn (MED = 67.5), likely to have some opioid tolerance.    No neuromonitoring planned.    Anesthetic plan:  1) GA/ETT  2) Consider video laryngoscopy with in-line stabilization (previous cervical fusion x2)  3) Ketamine 0.5mg/kg x2 doses  4) Consider dexmedetomidine infusion  5) Hydromorphone IV ~3mg during the procedure    Postoperative pain:  1) Consider hydromorphone PCA 0/0.4/10  2) When writing for oral medications, may give oxycodone 15mg Q4h prn or hydromorphone PO 6mg Q4h prn  3) Acetaminophen 1000mg Q8h standing  4) Gabapentin 300mg TID  5) Methocarbamol 750mg Q8h standing.   
HPI: Patient is a 78 y/o man with history of CAD s/p ENIO, AICD, HTN, HL, PUD/GI bleed, and chronic low back pain who is admitted to [facility] on 12/22 for  L3-L4, L4-L5 decompression with interlaminar stabilization. The patient has a longstanding history of low back pain after a workplace accident in 1995. He has undergone multiple surgeries/fusions of his cervical spine and has been on chronic opioid therapy for the past 3-4 years. Currently, the patient takes oxycodone IR 15mg 3x/day prn. He denies any side effects with this medication. At this point, the patient endorses having an aching pain across the low lumbar region that is worse with standing and walking.       PAST MEDICAL & SURGICAL HISTORY:  HTN (hypertension)      High cholesterol      Myocardial infarct  10 years ago      GERD (gastroesophageal reflux disease)      AICD (automatic cardioverter/defibrillator) present      Erectile dysfunction      Back pain      AICD (automatic cardioverter/defibrillator) present      CAD (coronary artery disease)  3 stents      H/O uvulectomy      S/P T&amp;A (status post tonsillectomy and adenoidectomy)          Hospital Course:    TODAY'S SUBJECTIVE & REVIEW OF SYMPTOMS:     Constitutional WNL   Cardio WNL   Resp WNL   GI WNL  Heme WNL  Endo WNL  Skin WNL  MSK back pain  Neuro WNL  Cognitive WNL  Psych WNL      MEDICATIONS  (STANDING):  atorvastatin 80 milliGRAM(s) Oral at bedtime  enalapril 10 milliGRAM(s) Oral daily  hydrOXYzine hydrochloride 25 milliGRAM(s) Oral daily  lactated ringers. 1000 milliLiter(s) (100 mL/Hr) IV Continuous <Continuous>  methocarbamol 750 milliGRAM(s) Oral every 8 hours  multivitamin 1 Tablet(s) Oral daily  pantoprazole  Injectable 40 milliGRAM(s) IV Push daily  senna 2 Tablet(s) Oral at bedtime  sodium chloride 0.9%. 1000 milliLiter(s) (75 mL/Hr) IV Continuous <Continuous>    MEDICATIONS  (PRN):  acetaminophen     Tablet .. 650 milliGRAM(s) Oral every 6 hours PRN Temp greater or equal to 38C (100.4F), Mild Pain (1 - 3)  diphenhydrAMINE 25 milliGRAM(s) Oral every 4 hours PRN Rash and/or Itching  HYDROmorphone  Injectable 0.5 milliGRAM(s) IV Push every 10 minutes PRN Moderate Pain (4 - 6)  HYDROmorphone  Injectable 1 milliGRAM(s) IV Push every 30 minutes PRN Severe Pain (7 - 10)  HYDROmorphone  Injectable 1 milliGRAM(s) IV Push every 6 hours PRN Severe Pain (7 - 10)  ondansetron   Disintegrating Tablet 4 milliGRAM(s) Oral every 6 hours PRN Nausea  ondansetron Injectable 4 milliGRAM(s) IV Push once PRN Nausea and/or Vomiting  oxyCODONE    IR 15 milliGRAM(s) Oral every 6 hours PRN Moderate Pain (4 - 6)      FAMILY HISTORY:      Allergies    oxycodone (Pruritus)    Intolerances        SOCIAL HISTORY:    [  ] Etoh  [  ] Smoking  [  ] Substance abuse     Home Environment:  [   ] Home Alone  [ x  ] Lives with Family  [   ] Home Health Aid    Dwelling:  [   ] Apartment  [ x  ] Private House  [   ] Adult Home  [   ] Skilled Nursing Facility      [   ] Short Term  [   ] Long Term  [   ] Stairs       Elevator [   ]    FUNCTIONAL STATUS PTA: (Check all that apply)  Ambulation: [  x  ]Independent    [   ] Dependent     [   ] Non-Ambulatory  Assistive Device: [   ] SA Cane  [   ]  Q Cane  [   ] Walker  [   ]  Wheelchair  ADL : [x   ] Independent  [    ]  Dependent       Vital Signs Last 24 Hrs  T(C): 36.7 (22 Dec 2022 15:15), Max: 37 (22 Dec 2022 06:28)  T(F): 98 (22 Dec 2022 15:15), Max: 98.6 (22 Dec 2022 06:28)  HR: 91 (22 Dec 2022 16:15) (91 - 117)  BP: 109/68 (22 Dec 2022 16:15) (100/58 - 115/68)  BP(mean): 85 (22 Dec 2022 16:15) (72 - 85)  RR: 20 (22 Dec 2022 16:15) (15 - 20)  SpO2: 100% (22 Dec 2022 16:15) (96% - 100%)    Parameters below as of 22 Dec 2022 16:15  Patient On (Oxygen Delivery Method): room air          PHYSICAL EXAM: Awake & Alert  GENERAL: NAD  HEAD:  Normocephalic  CHEST/LUNG: Clear   HEART: S1S2+  ABDOMEN: Soft, Nontender  EXTREMITIES:  no calf tenderness    NERVOUS SYSTEM:  Cranial Nerves 2-12 intact [   ] Abnormal  [   ]  ROM: WFL all extremities [ x  ]  Abnormal [   ]  Motor Strength: WFL all extremities  [  x ]  Abnormal [   ]  Sensation: intact to light touch [ x  ] Abnormal [   ]    FUNCTIONAL STATUS:  Bed Mobility: Independent [   ]  Supervision [   ]  Needs Assistance [ x  ]  N/A [   ]  Transfers: Independent [   ]  Supervision [   ]  Needs Assistance [   ]  N/A [   ]   Ambulation: Independent [   ]  Supervision [   ]  Needs Assistance [   ]  N/A [   ]  ADL: Independent [   ] Requires Assistance [   ] N/A [   ]      LABS:                        11.5   8.21  )-----------( 201      ( 22 Dec 2022 14:50 )             34.6     12-22    141  |  107  |  12  ----------------------------<  169<H>  4.4   |  21  |  0.9    Ca    8.2<L>      22 Dec 2022 14:50    TPro  5.5<L>  /  Alb  3.6  /  TBili  0.6  /  DBili  x   /  AST  27  /  ALT  25  /  AlkPhos  105  12-22          RADIOLOGY & ADDITIONAL STUDIES:

## 2022-12-22 NOTE — BRIEF OPERATIVE NOTE - NSICDXBRIEFPROCEDURE_GEN_ALL_CORE_FT
PROCEDURES:  Laminectomy for decompression of nerve root with insertion of interlaminar stabilization device 22-Dec-2022 13:46:31  Chung Henderson  Laminectomy, decompressive, spine, lumbar, 1 level 22-Dec-2022 13:47:13  Chung Henderson

## 2022-12-22 NOTE — BRIEF OPERATIVE NOTE - TYPE OF ANESTHESIA
CHIEF COMPLAINT  Chief Complaint   Patient presents with   • Left Ankle - Pain, Swelling   • Pain     Patient rolled left ankle about 15 + yrs ago while wearing heels.  Ongoing pain since.       SUBJECTIVE    This patient presents today for evaluation of the left   ankle.  They sustained an inversion injury to the ankle over 15 years ago while wearing some high-heeled shoes.  She was diagnosed as having a sprain based on mechanism of injury and presence of bruising swelling and lateral ankle pain.  Treatment at the time consisted of rest, elevation, ice.  Over the years patient has complained of chronic aching in the ankle worsened by bouts of instability.  Uneven ground is very challenging for her.  She recently saw her PCP, Dr. Fregoso who sent her here for consultation.  MEDICATIONS  Medications were reviewed and updated today.    HISTORIES  I have personally reviewed and updated the following EMR sections: Current medications, Allergies, Problem list, Past Medical History, Past Surgical History, Social History and Family History.  Please reference intake form.    REVIEW OF SYSTEMS  All other systems are reviewed and are negative except as documented in the HPI (History of Present Illness)    PHYSICAL EXAM  Visit Vitals  LMP 01/30/2020      Constitutional:  Well developed, well nourished female in no acute distress.  Skin: Warm, dry, intact without rash or lesion.  Neurologic:  Alert & oriented x 3,  Normal gait.    Psychiatric:  Speech and behavior appropriate.  Musculoskeletal:Examination reveals a very pleasant individual with complaints of pain in the affected ankle, left.   Gait: No limp  Examination of the injured left ankle shows it's not swollen. The skin is intact.  They're tender on the lateral aspect of the ankle over the ATFL and CFL.  She has obvious inversion laxity on the left versus right.. No deltoid pain is present. They deny midfoot and forefoot pain.     Range of motion of this  injured ankle demonstrates motion from +5-30 °. Subtalar joint motion is not significantly restricted by pain. Examination of the opposite ankle demonstrates no pain, swelling and deformity. Skin is intact. They're neurovascularly intact with 5 over 5 strength in all groups.    IMAGING  I have reviewed the pertinent imaging study reports. These are the pertinent findings:  XR STRESS JOINT MANIPULATION BY PHYSICIAN  Stress AP view of both ankles demonstrate 16 degrees of talar tilt on the   left versus minimal talar tilt on the right.  No fracture or dislocation.  Feet x-rays performed yesterday at the request of Dr. Fregoso which reveal an intact ankle mortise and no fracture dislocation.      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  I think going forward she would benefit from stabilization in the form of Broström Leal procedure.  Benefits would be restoration of stability of the ankle which would optimize long-term function.  Risks include infection, bleeding, numbness, stiffness, recurrence, overtightening.  She would like to consider and will get back to me.  We discussed the concept of repeating conservative care rendered already including bracing and PT.  In all likelihood she is not likely see any benefit based on previous experience and did not recommend..  All questions answered.  Pt voiced understanding.    General

## 2022-12-22 NOTE — BRIEF OPERATIVE NOTE - NSICDXBRIEFPREOP_GEN_ALL_CORE_FT
PRE-OP DIAGNOSIS:  Lumbar stenosis with neurogenic claudication 22-Dec-2022 13:47:39  Chung Henderson

## 2022-12-22 NOTE — PACU DISCHARGE NOTE - COMMENTS
Pt now SP GETA requiring periods of phenylephrine infusion to maintain BP. Pt endorsed to Neuro ICU NP Ema@ [id number]. PACU vital signs: /59 P115 R20 O2 sat95% Temp98.3F

## 2022-12-22 NOTE — BRIEF OPERATIVE NOTE - NSICDXBRIEFPOSTOP_GEN_ALL_CORE_FT
POST-OP DIAGNOSIS:  Lumbar stenosis with neurogenic claudication 22-Dec-2022 13:48:08  Chung Henderson

## 2022-12-22 NOTE — CONSULT NOTE ADULT - ASSESSMENT
IMPRESSION: Rehab of lumbar stenosis     PRECAUTIONS: [   ] Cardiac  [   ] Respiratory  [   ] Seizures [   ] Contact Isolation  [   ] Droplet Isolation  [   ] Other    Weight Bearing Status:     RECOMMENDATION:    Out of Bed to Chair     DVT/Decubiti Prophylaxis    REHAB PLAN:     [  x  ] Bedside P/T 3-5 times a week   [    ]   Bedside O/T  2-3 times a week             [    ] Speech Therapy               [    ]  No Rehab Therapy Indicated   Conditioning/ROM                                    ADL  Bed Mobility                                               Conditioning/ROM  Transfers                                                     Bed Mobility  Sitting /Standing Balance                         Transfers                                        Gait Training                                               Sitting/Standing Balance  Stair Training [   ]Applicable                    Home equipment Eval                                                                        Splinting  [   ] Only      GOALS:   ADL   [    ]   Independent                    Transfers  [  x  ] Independent                          Ambulation  [ x   ] Independent     [ x    ] With device                            [    ]  CG                                                         [    ]  CG                                                                  [    ] CG                            [    ] Min A                                                   [    ] Min A                                                              [    ] Min  A          DISCHARGE PLAN:   [    ]  Good candidate for Intensive Rehabilitation/Hospital based                                             Will tolerate 3hrs Intensive Rehab Daily                                       [   x  ]  Short Term Rehab in Skilled Nursing Facility                           vs            [  x   ]  Home with Outpatient or VN services                                         [     ]  Possible Candidate for Intensive Hospital based Rehab

## 2022-12-22 NOTE — PROGRESS NOTE ADULT - SUBJECTIVE AND OBJECTIVE BOX
Neurosurgery Post Operative Note    POD#0  S/P L1-2 Decompressive Laminectomy, L3-5 Decompressive Laminectomy with Coflex  EBL: 120 cc | IVF 3L LR | UOP: 300cc        Subjective: 77yMale with a pmhx of M54.16 89283; 63698    M54.16 78120; 34684        NYDL/INS SHOWN    M54.16 76032; 90033    M54.16 85449; 53631    Anogenital (venereal) warts    Benign prostatic hyperplasia with lower urinary tract symptoms    Cervicalgia    Chest pain, unspecified    Coronary angioplasty status    Decreased libido    Encounter for adjustment and management of other implanted devices    Encounter for other preprocedural examination    History of Bacterial UTI    Injury of dorsal nerve roots, initial encounter    Low back pain, unspecified    Orgasmic dysfunction    Other ejaculatory dysfunction    Other specified abnormal findings of blood chemistry    Other symptoms and signs involving the musculoskeletal system    Pain in thoracic spine    Personal history of other diseases of the circulatory system    Radiculopathy, lumbar region    Spinal stenosis, lumbar region without neurogenic claudication    ^M54.16 40953; 14822        NYDL/INS SHOWN    Handoff    HTN (hypertension)    High cholesterol    Myocardial infarct    GERD (gastroesophageal reflux disease)    AICD (automatic cardioverter/defibrillator) present    Erectile dysfunction    Back pain    Lumbar stenosis with neurogenic claudication    Lumbar stenosis with neurogenic claudication    Laminectomy for decompression of nerve root with insertion of interlaminar stabilization device    Laminectomy, decompressive, spine, lumbar, 1 level    S/P CABG x 3    AICD (automatic cardioverter/defibrillator) present    CAD (coronary artery disease)    H/O uvulectomy    S/P T&amp;A (status post tonsillectomy and adenoidectomy)    SysAdmin_VstLnk      s/p     Allergies    oxycodone (Pruritus)    Intolerances        Vital Signs Last 24 Hrs  T(C): 36.7 (22 Dec 2022 15:15), Max: 37 (22 Dec 2022 06:28)  T(F): 98 (22 Dec 2022 15:15), Max: 98.6 (22 Dec 2022 06:28)  HR: 91 (22 Dec 2022 16:15) (91 - 117)  BP: 109/68 (22 Dec 2022 16:15) (100/58 - 115/68)  BP(mean): 85 (22 Dec 2022 16:15) (72 - 85)  RR: 20 (22 Dec 2022 16:15) (15 - 20)  SpO2: 100% (22 Dec 2022 16:15) (96% - 100%)      acetaminophen     Tablet .. 650 milliGRAM(s) Oral every 6 hours PRN  atorvastatin 80 milliGRAM(s) Oral at bedtime  diphenhydrAMINE 25 milliGRAM(s) Oral every 4 hours PRN  enalapril 10 milliGRAM(s) Oral daily  HYDROmorphone  Injectable 0.5 milliGRAM(s) IV Push every 10 minutes PRN  HYDROmorphone  Injectable 1 milliGRAM(s) IV Push every 30 minutes PRN  HYDROmorphone  Injectable 1 milliGRAM(s) IV Push every 6 hours PRN  hydrOXYzine hydrochloride 25 milliGRAM(s) Oral daily  lactated ringers. 1000 milliLiter(s) IV Continuous <Continuous>  methocarbamol 750 milliGRAM(s) Oral every 8 hours  multivitamin 1 Tablet(s) Oral daily  ondansetron   Disintegrating Tablet 4 milliGRAM(s) Oral every 6 hours PRN  ondansetron Injectable 4 milliGRAM(s) IV Push once PRN  oxyCODONE    IR 15 milliGRAM(s) Oral every 6 hours PRN  pantoprazole  Injectable 40 milliGRAM(s) IV Push daily  senna 2 Tablet(s) Oral at bedtime  sodium chloride 0.9%. 1000 milliLiter(s) IV Continuous <Continuous>        12-22-22 @ 07:01  -  12-22-22 @ 17:39  --------------------------------------------------------  IN: 75 mL / OUT: 50 mL / NET: 25 mL        REVIEW OF SYSTEMS    [ ] A ten-point review of systems was otherwise negative except as noted.  [ ] Due to altered mental status/intubation, subjective information were not able to be obtained from the patient. History was obtained, to the extent possible, from review of the chart and collateral sources of information.      Exam:  AAOX3. Verbal function intact  tongue midline, facial motions symmetric  PERRLA, EOMI  Pronator Drift:   Finger to Nose intact  Motor: MAEx4, 5/5 power in b/l UE and LE  Sensation: intact to touch in all extremities        CBC Full  -  ( 22 Dec 2022 14:50 )  WBC Count : 8.21 K/uL  RBC Count : 3.67 M/uL  Hemoglobin : 11.5 g/dL  Hematocrit : 34.6 %  Platelet Count - Automated : 201 K/uL  Mean Cell Volume : 94.3 fL  Mean Cell Hemoglobin : 31.3 pg  Mean Cell Hemoglobin Concentration : 33.2 g/dL  Auto Neutrophil # : x  Auto Lymphocyte # : x  Auto Monocyte # : x  Auto Eosinophil # : x  Auto Basophil # : x  Auto Neutrophil % : x  Auto Lymphocyte % : x  Auto Monocyte % : x  Auto Eosinophil % : x  Auto Basophil % : x    12-22    141  |  107  |  12  ----------------------------<  169<H>  4.4   |  21  |  0.9    Ca    8.2<L>      22 Dec 2022 14:50    TPro  5.5<L>  /  Alb  3.6  /  TBili  0.6  /  DBili  x   /  AST  27  /  ALT  25  /  AlkPhos  105  12-22            Wound:    Imaging:    Assessment/Plan:          Neurosurgery Post Operative Note    POD#0  S/P L1-2 Decompressive Laminectomy, L3-5 Decompressive Laminectomy with Coflex    EBL: 120 cc | IVF 3L LR | UOP: 300cc    This is a 77 year old male HTN, HLD, MI, CAD with stents (On ASA1, stopped 4 days ago), S/P CABG,  AICD placement, now s/p L1-2 Decompressive Laminectomy, L3-5 Decompressive Laminectomy with Coflex under GA with an uneventful OR course and was extubated intraoperatively.  Pt seen and examined at the bedside, At present time the pt is laying in bed in NAD and hemodynamically stable.  Pt endorses mild back sorness.  Pt is BOLDEN 5/5, + baseline paraesthesia to the plantar aspect of his b/l foot.  Surgical site CDI.        Subjective: 77yMale with a pmhx of M54.16 87059; 68562    M54.16 82909; 08305        NYDL/INS SHOWN    M54.16 82073; 25244    M54.16 49765; 04151    Anogenital (venereal) warts    Benign prostatic hyperplasia with lower urinary tract symptoms    Cervicalgia    Chest pain, unspecified    Coronary angioplasty status    Decreased libido    Encounter for adjustment and management of other implanted devices    Encounter for other preprocedural examination    History of Bacterial UTI    Injury of dorsal nerve roots, initial encounter    Low back pain, unspecified    Orgasmic dysfunction    Other ejaculatory dysfunction    Other specified abnormal findings of blood chemistry    Other symptoms and signs involving the musculoskeletal system    Pain in thoracic spine    Personal history of other diseases of the circulatory system    Radiculopathy, lumbar region    Spinal stenosis, lumbar region without neurogenic claudication    ^M54.16 38460; 55390        NYDL/INS SHOWN    Handoff    HTN (hypertension)    High cholesterol    Myocardial infarct    GERD (gastroesophageal reflux disease)    AICD (automatic cardioverter/defibrillator) present    Erectile dysfunction    Back pain    Lumbar stenosis with neurogenic claudication    Lumbar stenosis with neurogenic claudication    Laminectomy for decompression of nerve root with insertion of interlaminar stabilization device    Laminectomy, decompressive, spine, lumbar, 1 level    S/P CABG x 3    AICD (automatic cardioverter/defibrillator) present    CAD (coronary artery disease)    H/O uvulectomy    S/P T&amp;A (status post tonsillectomy and adenoidectomy)    SysAdmin_VstLnk      s/p     Allergies    oxycodone (Pruritus)    Intolerances        Vital Signs Last 24 Hrs  T(C): 36.7 (22 Dec 2022 15:15), Max: 37 (22 Dec 2022 06:28)  T(F): 98 (22 Dec 2022 15:15), Max: 98.6 (22 Dec 2022 06:28)  HR: 91 (22 Dec 2022 16:15) (91 - 117)  BP: 109/68 (22 Dec 2022 16:15) (100/58 - 115/68)  BP(mean): 85 (22 Dec 2022 16:15) (72 - 85)  RR: 20 (22 Dec 2022 16:15) (15 - 20)  SpO2: 100% (22 Dec 2022 16:15) (96% - 100%)      acetaminophen     Tablet .. 650 milliGRAM(s) Oral every 6 hours PRN  atorvastatin 80 milliGRAM(s) Oral at bedtime  diphenhydrAMINE 25 milliGRAM(s) Oral every 4 hours PRN  enalapril 10 milliGRAM(s) Oral daily  HYDROmorphone  Injectable 0.5 milliGRAM(s) IV Push every 10 minutes PRN  HYDROmorphone  Injectable 1 milliGRAM(s) IV Push every 30 minutes PRN  HYDROmorphone  Injectable 1 milliGRAM(s) IV Push every 6 hours PRN  hydrOXYzine hydrochloride 25 milliGRAM(s) Oral daily  lactated ringers. 1000 milliLiter(s) IV Continuous <Continuous>  methocarbamol 750 milliGRAM(s) Oral every 8 hours  multivitamin 1 Tablet(s) Oral daily  ondansetron   Disintegrating Tablet 4 milliGRAM(s) Oral every 6 hours PRN  ondansetron Injectable 4 milliGRAM(s) IV Push once PRN  oxyCODONE    IR 15 milliGRAM(s) Oral every 6 hours PRN  pantoprazole  Injectable 40 milliGRAM(s) IV Push daily  senna 2 Tablet(s) Oral at bedtime  sodium chloride 0.9%. 1000 milliLiter(s) IV Continuous <Continuous>        12-22-22 @ 07:01  -  12-22-22 @ 17:39  --------------------------------------------------------  IN: 75 mL / OUT: 50 mL / NET: 25 mL        REVIEW OF SYSTEMS    [x ] A ten-point review of systems was otherwise negative except as noted.  [ ] Due to altered mental status/intubation, subjective information were not able to be obtained from the patient. History was obtained, to the extent possible, from review of the chart and collateral sources of information.      Exam:  resting in bed in NAD  AAOX3. Verbal function intact  tongue midline, facial motions symmetric  PERRLA, EOMI  Motor: MAEx4, 5/5 power in b/l UE and LE  b/l  intact  5/5 b/l df/pf  Sensation: + baseline parasthenia to plantar aspect of b/l foot  wound: DCI  closed with nylon, dressed with Xeroform Telfa/Tegaderm         CBC Full  -  ( 22 Dec 2022 14:50 )  WBC Count : 8.21 K/uL  RBC Count : 3.67 M/uL  Hemoglobin : 11.5 g/dL  Hematocrit : 34.6 %  Platelet Count - Automated : 201 K/uL  Mean Cell Volume : 94.3 fL  Mean Cell Hemoglobin : 31.3 pg  Mean Cell Hemoglobin Concentration : 33.2 g/dL  Auto Neutrophil # : x  Auto Lymphocyte # : x  Auto Monocyte # : x  Auto Eosinophil # : x  Auto Basophil # : x  Auto Neutrophil % : x  Auto Lymphocyte % : x  Auto Monocyte % : x  Auto Eosinophil % : x  Auto Basophil % : x    12-22    141  |  107  |  12  ----------------------------<  169<H>  4.4   |  21  |  0.9    Ca    8.2<L>      22 Dec 2022 14:50    TPro  5.5<L>  /  Alb  3.6  /  TBili  0.6  /  DBili  x   /  AST  27  /  ALT  25  /  AlkPhos  105  12-22            Assessment/Plan:   This is a 77 year old male POD#0 S/P L1-2 Decompressive Laminectomy, L3-5 Decompressive Laminectomy with Coflex    Pain control  Cont home htn/hld meds  DASH diet, GI ppx  DVT ppx; b/l SCD  PT/OT rehab  Medicine comanagement     Plan and care discussed with Dr Henderson, pt updated       Neurosurgery Post Operative Note    POD#0  S/P L1-2 Decompressive Laminectomy, L3-5 Decompressive Laminectomy with Coflex    EBL: 120 cc | IVF 3L LR | UOP: 300cc    This is a 77 year old male HTN, HLD, MI, CAD with stents (On ASA1, stopped 4 days ago), S/P CABG,  AICD placement, now s/p L1-2 Decompressive Laminectomy, L3-5 Decompressive Laminectomy with Coflex under GA with an uneventful OR course and was extubated intraoperatively.  Pt seen and examined at the bedside, At present time the pt is laying in bed in NAD and hemodynamically stable.  Pt endorses mild back sorness.  Pt is BOLDEN 5/5, + baseline paraesthesia to the plantar aspect of his b/l foot.  Surgical site CDI.        Subjective: 77yMale with a pmhx of M54.16 16157; 88885    M54.16 35314; 06310        NYDL/INS SHOWN    M54.16 15234; 77485    M54.16 45689; 87760    Anogenital (venereal) warts    Benign prostatic hyperplasia with lower urinary tract symptoms    Cervicalgia    Chest pain, unspecified    Coronary angioplasty status    Decreased libido    Encounter for adjustment and management of other implanted devices    Encounter for other preprocedural examination    History of Bacterial UTI    Injury of dorsal nerve roots, initial encounter    Low back pain, unspecified    Orgasmic dysfunction    Other ejaculatory dysfunction    Other specified abnormal findings of blood chemistry    Other symptoms and signs involving the musculoskeletal system    Pain in thoracic spine    Personal history of other diseases of the circulatory system    Radiculopathy, lumbar region    Spinal stenosis, lumbar region without neurogenic claudication    ^M54.16 07980; 68273        NYDL/INS SHOWN    Handoff    HTN (hypertension)    High cholesterol    Myocardial infarct    GERD (gastroesophageal reflux disease)    AICD (automatic cardioverter/defibrillator) present    Erectile dysfunction    Back pain    Lumbar stenosis with neurogenic claudication    Lumbar stenosis with neurogenic claudication    Laminectomy for decompression of nerve root with insertion of interlaminar stabilization device    Laminectomy, decompressive, spine, lumbar, 1 level    S/P CABG x 3    AICD (automatic cardioverter/defibrillator) present    CAD (coronary artery disease)    H/O uvulectomy    S/P T&amp;A (status post tonsillectomy and adenoidectomy)    SysAdmin_VstLnk      s/p     Allergies    oxycodone (Pruritus)    Intolerances        Vital Signs Last 24 Hrs  T(C): 36.7 (22 Dec 2022 15:15), Max: 37 (22 Dec 2022 06:28)  T(F): 98 (22 Dec 2022 15:15), Max: 98.6 (22 Dec 2022 06:28)  HR: 91 (22 Dec 2022 16:15) (91 - 117)  BP: 109/68 (22 Dec 2022 16:15) (100/58 - 115/68)  BP(mean): 85 (22 Dec 2022 16:15) (72 - 85)  RR: 20 (22 Dec 2022 16:15) (15 - 20)  SpO2: 100% (22 Dec 2022 16:15) (96% - 100%)      acetaminophen     Tablet .. 650 milliGRAM(s) Oral every 6 hours PRN  atorvastatin 80 milliGRAM(s) Oral at bedtime  diphenhydrAMINE 25 milliGRAM(s) Oral every 4 hours PRN  enalapril 10 milliGRAM(s) Oral daily  HYDROmorphone  Injectable 0.5 milliGRAM(s) IV Push every 10 minutes PRN  HYDROmorphone  Injectable 1 milliGRAM(s) IV Push every 30 minutes PRN  HYDROmorphone  Injectable 1 milliGRAM(s) IV Push every 6 hours PRN  hydrOXYzine hydrochloride 25 milliGRAM(s) Oral daily  lactated ringers. 1000 milliLiter(s) IV Continuous <Continuous>  methocarbamol 750 milliGRAM(s) Oral every 8 hours  multivitamin 1 Tablet(s) Oral daily  ondansetron   Disintegrating Tablet 4 milliGRAM(s) Oral every 6 hours PRN  ondansetron Injectable 4 milliGRAM(s) IV Push once PRN  oxyCODONE    IR 15 milliGRAM(s) Oral every 6 hours PRN  pantoprazole  Injectable 40 milliGRAM(s) IV Push daily  senna 2 Tablet(s) Oral at bedtime  sodium chloride 0.9%. 1000 milliLiter(s) IV Continuous <Continuous>        12-22-22 @ 07:01  -  12-22-22 @ 17:39  --------------------------------------------------------  IN: 75 mL / OUT: 50 mL / NET: 25 mL        REVIEW OF SYSTEMS    [x ] A ten-point review of systems was otherwise negative except as noted.  [ ] Due to altered mental status/intubation, subjective information were not able to be obtained from the patient. History was obtained, to the extent possible, from review of the chart and collateral sources of information.      Exam:  resting in bed in NAD  AAOX3. Verbal function intact  tongue midline, facial motions symmetric  PERRLA, EOMI  Motor: MAEx4, 5/5 power in b/l UE and LE  b/l  intact  5/5 b/l df/pf  Sensation: + baseline parasthenia to plantar aspect of b/l foot  wound: DCI  closed with nylon, dressed with Xeroform Telfa/Tegaderm         CBC Full  -  ( 22 Dec 2022 14:50 )  WBC Count : 8.21 K/uL  RBC Count : 3.67 M/uL  Hemoglobin : 11.5 g/dL  Hematocrit : 34.6 %  Platelet Count - Automated : 201 K/uL  Mean Cell Volume : 94.3 fL  Mean Cell Hemoglobin : 31.3 pg  Mean Cell Hemoglobin Concentration : 33.2 g/dL  Auto Neutrophil # : x  Auto Lymphocyte # : x  Auto Monocyte # : x  Auto Eosinophil # : x  Auto Basophil # : x  Auto Neutrophil % : x  Auto Lymphocyte % : x  Auto Monocyte % : x  Auto Eosinophil % : x  Auto Basophil % : x    12-22    141  |  107  |  12  ----------------------------<  169<H>  4.4   |  21  |  0.9    Ca    8.2<L>      22 Dec 2022 14:50    TPro  5.5<L>  /  Alb  3.6  /  TBili  0.6  /  DBili  x   /  AST  27  /  ALT  25  /  AlkPhos  105  12-22            Assessment/Plan:   This is a 77 year old male POD#0 S/P L1-2 Decompressive Laminectomy, L3-5 Decompressive Laminectomy with Coflex    Pain control  Cont home htn/hld meds, holding ASA  DASH diet, GI ppx  DVT ppx; b/l SCD  PT/OT rehab  Medicine comanagement     Plan and care discussed with Dr Henderson, pt updated

## 2022-12-23 ENCOUNTER — TRANSCRIPTION ENCOUNTER (OUTPATIENT)
Age: 77
End: 2022-12-23

## 2022-12-23 VITALS
TEMPERATURE: 99 F | OXYGEN SATURATION: 96 % | RESPIRATION RATE: 18 BRPM | SYSTOLIC BLOOD PRESSURE: 103 MMHG | HEART RATE: 92 BPM | DIASTOLIC BLOOD PRESSURE: 53 MMHG

## 2022-12-23 PROCEDURE — 99238 HOSP IP/OBS DSCHRG MGMT 30/<: CPT

## 2022-12-23 RX ORDER — SODIUM CHLORIDE 9 MG/ML
500 INJECTION INTRAMUSCULAR; INTRAVENOUS; SUBCUTANEOUS ONCE
Refills: 0 | Status: COMPLETED | OUTPATIENT
Start: 2022-12-23 | End: 2022-12-23

## 2022-12-23 RX ORDER — CYCLOBENZAPRINE HYDROCHLORIDE 10 MG/1
10 TABLET, FILM COATED ORAL
Qty: 0 | Refills: 0 | DISCHARGE

## 2022-12-23 RX ORDER — ACETAMINOPHEN 500 MG
1000 TABLET ORAL ONCE
Refills: 0 | Status: COMPLETED | OUTPATIENT
Start: 2022-12-23 | End: 2022-12-23

## 2022-12-23 RX ORDER — CEPHALEXIN 500 MG
1 CAPSULE ORAL
Qty: 15 | Refills: 0
Start: 2022-12-23 | End: 2022-12-27

## 2022-12-23 RX ORDER — SENNA PLUS 8.6 MG/1
2 TABLET ORAL
Qty: 0 | Refills: 0 | DISCHARGE
Start: 2022-12-23

## 2022-12-23 RX ADMIN — Medication 1000 MILLIGRAM(S): at 06:42

## 2022-12-23 RX ADMIN — SODIUM CHLORIDE 500 MILLILITER(S): 9 INJECTION INTRAMUSCULAR; INTRAVENOUS; SUBCUTANEOUS at 06:01

## 2022-12-23 RX ADMIN — Medication 400 MILLIGRAM(S): at 05:59

## 2022-12-23 RX ADMIN — SODIUM CHLORIDE 500 MILLILITER(S): 9 INJECTION INTRAMUSCULAR; INTRAVENOUS; SUBCUTANEOUS at 00:11

## 2022-12-23 NOTE — DISCHARGE NOTE PROVIDER - NSDCFUADDINST_GEN_ALL_CORE_FT
may shower do not scrub incision site , may remove dressing in 2 days then leave open to the air   call Dr Henderson if you experience worsening of symptoms , fever over 101 or drainage from the incision site   follow up within 2 weeks in the office   do not bend over or  heavy objects   Do not drink alcohol or drive while taking pain medications

## 2022-12-23 NOTE — PROVIDER CONTACT NOTE (OTHER) - SITUATION
patient refusing lab draw this morning
patient's vital signs this morning are 99/53, heart rate of 95, and saturating 95% on 2L nc. patient is now due for 0600 dose of enalapril
patient's blood pressure is 90/55, heart rate of 103, and saturating 93% on room air

## 2022-12-23 NOTE — PROGRESS NOTE ADULT - SUBJECTIVE AND OBJECTIVE BOX
POD # 1    S/P L1-2 laminectomy L3-5 decompression with Coflex     pt seen and examined at bedside sitting in the chair pt without complaints at this time         Vital Signs Last 24 Hrs  T(C): 37.1 (23 Dec 2022 06:58), Max: 37.1 (23 Dec 2022 06:58)  T(F): 98.8 (23 Dec 2022 06:58), Max: 98.8 (23 Dec 2022 06:58)  HR: 92 (23 Dec 2022 06:58) (88 - 117)  BP: 103/53 (23 Dec 2022 06:58) (90/55 - 125/63)  BP(mean): 78 (22 Dec 2022 17:15) (72 - 85)  RR: 18 (23 Dec 2022 06:58) (18 - 20)  SpO2: 96% (23 Dec 2022 06:58) (92% - 100%)    Parameters below as of 23 Dec 2022 06:58  Patient On (Oxygen Delivery Method): room air        PHYSICAL EXAM:    ALert, Follows commands   MAEX4   MS bilateral UE's 5/5         bilateral LE's 5/5   Back incision clean dry intact         MEDICATIONS:  Antibiotics:    Neuro:  acetaminophen     Tablet .. 650 milliGRAM(s) Oral every 6 hours PRN  diphenhydrAMINE 25 milliGRAM(s) Oral every 4 hours PRN  HYDROmorphone  Injectable 1 milliGRAM(s) IV Push every 6 hours PRN  hydrOXYzine hydrochloride 25 milliGRAM(s) Oral daily  methocarbamol 750 milliGRAM(s) Oral every 8 hours  ondansetron   Disintegrating Tablet 4 milliGRAM(s) Oral every 6 hours PRN  oxyCODONE    IR 15 milliGRAM(s) Oral every 6 hours PRN    Anticoagulation:    OTHER:  atorvastatin 80 milliGRAM(s) Oral at bedtime  enalapril 10 milliGRAM(s) Oral daily  pantoprazole  Injectable 40 milliGRAM(s) IV Push daily  senna 2 Tablet(s) Oral at bedtime    IVF:  multivitamin 1 Tablet(s) Oral daily  sodium chloride 0.9%. 1000 milliLiter(s) IV Continuous <Continuous>        LABS:                        11.5   8.21  )-----------( 201      ( 22 Dec 2022 14:50 )             34.6     12-22    141  |  107  |  12  ----------------------------<  169<H>  4.4   |  21  |  0.9    Ca    8.2<L>      22 Dec 2022 14:50    TPro  5.5<L>  /  Alb  3.6  /  TBili  0.6  /  DBili  x   /  AST  27  /  ALT  25  /  AlkPhos  105  12-22          A/p          S/P L1-2 laminectomy L3-5 decompression with Coflex                DC calderon                Neuro stable                DC home

## 2022-12-23 NOTE — PHYSICAL THERAPY INITIAL EVALUATION ADULT - GENERAL OBSERVATIONS, REHAB EVAL
Pt encountered in the b/s chair, c/o pain at the surgical site, agreeable for b/s PT. Pt left seated in b/s chair post tx all needs within reach. DANIEL Corey made aware.

## 2022-12-23 NOTE — PHYSICAL THERAPY INITIAL EVALUATION ADULT - PERTINENT HX OF CURRENT PROBLEM, REHAB EVAL
77 year old male HTN, HLD, MI, CAD with stents (On ASA1, stopped 4 days ago), S/P CABG,  AICD placement, now s/p L1-2 Decompressive Laminectomy, L3-5 Decompressive Laminectomy with Coflex under GA with an uneventful OR course and was extubated intraoperatively.  Pt seen and examined at the bedside, At present time the pt is laying in bed in NAD and hemodynamically stable.  Pt endorses mild back soreness.  Pt is BOLDEN 5/5, + baseline paraesthesia to the plantar aspect of his b/l foot.  Surgical site CDI.

## 2022-12-23 NOTE — DISCHARGE NOTE NURSING/CASE MANAGEMENT/SOCIAL WORK - PATIENT PORTAL LINK FT
You can access the FollowMyHealth Patient Portal offered by [facility] by registering at the following website: [email or web address]. By joining [facility]’s FollowMyHealth portal, you will also be able to view your health information using other applications (apps) compatible with our system.

## 2022-12-23 NOTE — OCCUPATIONAL THERAPY INITIAL EVALUATION ADULT - LEVEL OF INDEPENDENCE: EATING, OT EVAL
independent Griseofulvin Counseling:  I discussed with the patient the risks of griseofulvin including but not limited to photosensitivity, cytopenia, liver damage, nausea/vomiting and severe allergy.  The patient understands that this medication is best absorbed when taken with a fatty meal (e.g., ice cream or french fries).

## 2022-12-23 NOTE — PHYSICAL THERAPY INITIAL EVALUATION ADULT - IMPAIRMENTS CONTRIBUTING TO GAIT DEVIATIONS, PT EVAL
CC: tongue tie    HPI: Female infant born at 40.4wks via  to a 33 y/o  blood type A+ mother. No significant maternal or prenatal history reported. ENT was consulted for tongue tie. Baby was seen by the pediatrician and found to have a tongue tie. Pt is having difficulty latching leading to poor feeding. No fevers        PAST MEDICAL & SURGICAL HISTORY:    Allergies    No Known Allergies    Intolerances      MEDICATIONS  (STANDING):  dextrose 40% Oral Gel - Peds 0.6 Gram(s) Buccal once    MEDICATIONS  (PRN):      Social History: NA    Family history: no pertinent FHx    ROS:   ENT: all negative except as noted in HPI   CV: denies palpitations  Pulm: denies SOB, cough, hemoptysis  GI: denies change in apetite, indigestion, n/v  : denies pertinent urinary symptoms, urgency  Neuro: denies numbness/tingling, loss of sensation  Psych: denies anxiety  MS: denies muscle weakness, instability  Heme: denies easy bruising or bleeding  Endo: denies heat/cold intolerance, excessive sweating  Vascular: denies LE edema    Vital Signs Last 24 Hrs  T(C): 36.6 (2021 09:43), Max: 36.7 (2021 20:10)  T(F): 97.8 (2021 09:43), Max: 98 (2021 20:10)  HR: 138 (2021 09:43) (138 - 148)  BP: 74/46 (2021 16:26) (65/33 - 74/46)  BP(mean): 55 (2021 16:26) (44 - 55)  RR: 40 (2021 09:43) (40 - 42)  SpO2: --              PHYSICAL EXAM:  Gen: NAD  Skin: No rashes, bruises, or lesions  Head: Normocephalic, Atraumatic  Face: no edema, erythema, or fluctuance. Parotid glands soft without mass  Eyes: no scleral injection  Nose: Nares bilaterally patent, no discharge  Mouth: Prominent lingual frenulum, No cleft palate, No Stridor / Drooling / Trismus.  Mucosa moist, tongue/uvula midline, oropharynx clear  Neck: Flat, supple, no lymphadenopathy, trachea midline, no masses  Lymphatic: No lymphadenopathy  Resp: breathing easily, no stridor  CV: no peripheral edema/cyanosis  GI: nondistended   Peripheral vascular: no JVD or edema  Neuro: facial nerve intact, no facial droop        Preop Diagnosis: congenital ankyloglossia, breastfeeding difficulty    Postop Diagnosis: congenital ankyloglossia, breastfeeding difficulty    Procedure: Lingual frenulectomy    Surgeon: Ninaf Brown MD    Assistant:     Indications for procedure: Infant with difficulty feeding at the breast. Noted to have lingual ankyloglossia. Discussed r/b/a of frenulectomy with mother and she gave informed written consent.    Procedure:  Patient was identified with the nurse and time out performed. Lingual frenulum clamped with hemostat near the root of the tongue for 10 seconds. Care was taken to avoid the Shayna's duct orifices. Sharp iris scissors used to snip the frenulum and release the tongue. Pressure with a sponge used for hemostasis. Patient with good mobility of tongue after procedure. Patient tolerated the procedure well.
decreased flexibility/pain/decreased strength

## 2022-12-23 NOTE — DISCHARGE NOTE PROVIDER - CARE PROVIDER_API CALL
Chung Henderson)  Neurosurgery  [address] [place] [id number]  Phone: [phone]  Fax: [phone]  Follow Up Time:

## 2022-12-23 NOTE — DISCHARGE NOTE PROVIDER - HOSPITAL COURSE
this is a 77 yr old male with hx of back pain who presented for the above procedure pt underwent the procedure without an complications post op his BP was a little low and he was given two fluid boluses , pt  did well with PT and was discharged home on POD #1

## 2022-12-23 NOTE — DISCHARGE NOTE PROVIDER - NSDCCPTREATMENT_GEN_ALL_CORE_FT
PRINCIPAL PROCEDURE  Procedure: Laminectomy for decompression of nerve root with insertion of interlaminar stabilization device  Findings and Treatment:       SECONDARY PROCEDURE  Procedure: Laminectomy, decompressive, spine, lumbar, 1 level  Findings and Treatment:

## 2022-12-23 NOTE — DISCHARGE NOTE PROVIDER - NSDCMRMEDTOKEN_GEN_ALL_CORE_FT
acetaminophen 500 mg/15 mL oral liquid: 2 milliliter(s) orally 4 times a day MDD:8  aspirin 81 mg oral tablet: 81  orally once a day  cyclobenzaprine 10 mg oral tablet: orally once a day (at bedtime), As Needed  enalapril 10 mg oral tablet: 1 tab(s) orally once a day  hydrOXYzine hydrochloride 25 mg oral tablet:   Lipitor 80 mg oral tablet: 1 tab(s) orally once a day (at bedtime)  multivitamin:   oxyCODONE 15 mg oral tablet: 1 tab(s) orally every 6 hours -for moderate pain MDD:4   senna leaf extract oral tablet: 2 tab(s) orally once a day (at bedtime)

## 2022-12-23 NOTE — PROVIDER CONTACT NOTE (OTHER) - ACTION/TREATMENT ORDERED:
hold Enalapril as per BOZENA Marvin and 500 cc normal saline bolus will be ordered
BOEZNA Marvin made aware
place patient on 2L nc, and administer 500cc bolus of normal saline

## 2022-12-23 NOTE — OCCUPATIONAL THERAPY INITIAL EVALUATION ADULT - NSOTDISCHREC_GEN_A_CORE
Patient requires supervision with activities of daily living. OT recommends D/C to home with assistance. Refer to evaluation/treatment for details.

## 2022-12-23 NOTE — OCCUPATIONAL THERAPY INITIAL EVALUATION ADULT - LIVES WITH, PROFILE
Pt reports living in pvt home with 2 steps to enter. Pt reports living with others but does not want to disclose who.

## 2022-12-23 NOTE — DISCHARGE NOTE PROVIDER - NSDCFUSCHEDAPPT_GEN_ALL_CORE_FT
Kathie Phillips  [facility] Physician [facility]  ONCPAINMGT [address]  Scheduled Appointment: 01/04/2023    Elias Canales  [facility] Physician [facility]  UROLOGY [address] [facility]  Scheduled Appointment: 03/10/2023

## 2022-12-23 NOTE — DISCHARGE NOTE NURSING/CASE MANAGEMENT/SOCIAL WORK - NSDCPEFALRISK_GEN_ALL_CORE
For information on Fall & Injury Prevention, visit: [email or web address] OR  [email or web address] OR  https://www.cdc.gov/steadi/patient.html

## 2022-12-28 DIAGNOSIS — E78.00 PURE HYPERCHOLESTEROLEMIA, UNSPECIFIED: ICD-10-CM

## 2022-12-28 DIAGNOSIS — R33.8 OTHER RETENTION OF URINE: ICD-10-CM

## 2022-12-28 DIAGNOSIS — Z79.891 LONG TERM (CURRENT) USE OF OPIATE ANALGESIC: ICD-10-CM

## 2022-12-28 DIAGNOSIS — I25.10 ATHEROSCLEROTIC HEART DISEASE OF NATIVE CORONARY ARTERY WITHOUT ANGINA PECTORIS: ICD-10-CM

## 2022-12-28 DIAGNOSIS — G89.29 OTHER CHRONIC PAIN: ICD-10-CM

## 2022-12-28 DIAGNOSIS — I25.2 OLD MYOCARDIAL INFARCTION: ICD-10-CM

## 2022-12-28 DIAGNOSIS — T41.295A ADVERSE EFFECT OF OTHER GENERAL ANESTHETICS, INITIAL ENCOUNTER: ICD-10-CM

## 2022-12-28 DIAGNOSIS — I10 ESSENTIAL (PRIMARY) HYPERTENSION: ICD-10-CM

## 2022-12-28 DIAGNOSIS — M48.062 SPINAL STENOSIS, LUMBAR REGION WITH NEUROGENIC CLAUDICATION: ICD-10-CM

## 2022-12-28 DIAGNOSIS — Z79.82 LONG TERM (CURRENT) USE OF ASPIRIN: ICD-10-CM

## 2022-12-28 DIAGNOSIS — Z95.1 PRESENCE OF AORTOCORONARY BYPASS GRAFT: ICD-10-CM

## 2022-12-28 DIAGNOSIS — K21.9 GASTRO-ESOPHAGEAL REFLUX DISEASE WITHOUT ESOPHAGITIS: ICD-10-CM

## 2022-12-28 DIAGNOSIS — Z95.810 PRESENCE OF AUTOMATIC (IMPLANTABLE) CARDIAC DEFIBRILLATOR: ICD-10-CM

## 2022-12-28 DIAGNOSIS — I95.81 POSTPROCEDURAL HYPOTENSION: ICD-10-CM

## 2022-12-28 DIAGNOSIS — Z95.5 PRESENCE OF CORONARY ANGIOPLASTY IMPLANT AND GRAFT: ICD-10-CM

## 2022-12-28 DIAGNOSIS — Z98.1 ARTHRODESIS STATUS: ICD-10-CM

## 2022-12-28 DIAGNOSIS — Z88.5 ALLERGY STATUS TO NARCOTIC AGENT: ICD-10-CM

## 2023-01-04 ENCOUNTER — APPOINTMENT (OUTPATIENT)
Dept: PAIN MANAGEMENT | Facility: CLINIC | Age: 78
End: 2023-01-04

## 2023-01-10 ENCOUNTER — APPOINTMENT (OUTPATIENT)
Dept: NEUROSURGERY | Facility: CLINIC | Age: 78
End: 2023-01-10
Payer: OTHER MISCELLANEOUS

## 2023-01-10 PROCEDURE — 99024 POSTOP FOLLOW-UP VISIT: CPT

## 2023-01-10 NOTE — ASSESSMENT
[FreeTextEntry1] : 76 yo M presents for an initial post-operative encounter s/p L1-2 laminectomy, L3-5 decompression w coflex. Patient is doing well post-operatively; tolerated suture removal well today. He is to continue with the recommended bowel regimen to avoid constipation. He is to ambulate daily as tolerated; avoid heavy lifting/pushing/pulling/carrying.\par \par All questions and concerns have been answered at this time. He is to return in 4 weeks for his second post-operative encounter and can contact me with any questions or concerns in the interim.\par \par Maritza Coiln PA-C\par Chung Henderson MD

## 2023-01-10 NOTE — HISTORY OF PRESENT ILLNESS
[FreeTextEntry1] : Mr. MUSTAFA presents for an initial post-operative encounter. He developed a fever 1-3 days after surgery and was issued antibiotic treatment by the hospital discharge staff for a presumed pulmonary illness. He has since recovered and notes that he is doing well overall. With regards to his surgical recovery, he notes a component mild isolated lumbago which is to be expected two weeks post-op. Radicular features along the posterior aspect of the bilateral lower extremities has since resolved. Muscle spasms noted into the anterior thighs which is mild and alleviated with the use of muscle relaxants as needed. He has been able to ambulate without an assistive device. \par \par Overall, he is quite pleased with his improvements thus far. We will continue to monitor his post-operative recovery over the coming weeks and he will contact me with any issues if they were to arise.\par \par WOUND: Area of the lumbar region undressed. Midline incision inspected; clean/dry/no evidence of eythema, edema, warmth or tenderness appreciated. Sutures moved without incident, patient tolerated the procedure quite well. Patient ambulating independently; no assistive device used at this time.

## 2023-01-10 NOTE — REVIEW OF SYSTEMS
[Constipation] : constipation [Fever] : no fever [Chills] : no chills [Confused or Disoriented] : no confusion [Facial Weakness] : no facial weakness [Seizures] : no convulsions [Cluster Headache] : no cluster headache [Difficulty Walking] : no difficulty walking [Eye Pain] : no eye pain [Earache] : no earache [Shortness Of Breath] : no shortness of breath [Abdominal Pain] : no abdominal pain [FreeTextEntry7] : constipation alleviated with stool softners

## 2023-02-08 ENCOUNTER — APPOINTMENT (OUTPATIENT)
Dept: PAIN MANAGEMENT | Facility: CLINIC | Age: 78
End: 2023-02-08
Payer: OTHER MISCELLANEOUS

## 2023-02-08 VITALS
WEIGHT: 240 LBS | HEART RATE: 89 BPM | DIASTOLIC BLOOD PRESSURE: 83 MMHG | HEIGHT: 70 IN | SYSTOLIC BLOOD PRESSURE: 131 MMHG | BODY MASS INDEX: 34.36 KG/M2

## 2023-02-08 PROCEDURE — 99072 ADDL SUPL MATRL&STAF TM PHE: CPT

## 2023-02-08 PROCEDURE — 99214 OFFICE O/P EST MOD 30 MIN: CPT

## 2023-02-08 RX ORDER — OXYCODONE 5 MG/1
5 TABLET ORAL
Qty: 90 | Refills: 0 | Status: DISCONTINUED | COMMUNITY
Start: 2018-10-24 | End: 2023-02-08

## 2023-02-08 NOTE — ASSESSMENT
[FreeTextEntry1] : Mr. Irizarry is a 77-year-old gentleman who sustained a work related accident in 1995 affecting his neck and lower back.. He will continue Oxycodone 15 mg p.o. t.i.d., cyclobenzaprine, and hydroxyzine. At his next visit we will discuss decreasing his medication.  He will follow up in 4 weeks. Medication was refilled today.  All this patients questions were answered and the conversation was understood well.\par \par AUREA, Betzaida Worrell, attest that this documentation has been prepared under the direction and in the presence of Provider Kathie Phillips MD.\par \par \par Thank you for allowing me to assist in the management of this patient. \par \par \par Best Regards, \par \par \par Kathie Phillips M.D., FAAPMR\par \par \par Diplomate, American Board of Physical Medicine and Rehabilitation\par Diplomate, American Board of Pain Medicine \par Diplomate, American Board of Pain Management\par

## 2023-02-08 NOTE — PHYSICAL EXAM
[Normal Coordination] : normal coordination [Normal DTR UE/LE] : normal DTR UE/LE  [Normal Sensation] : normal sensation [Normal Mood and Affect] : normal mood and affect [Orientated] : orientated [Able to Communicate] : able to communicate [Well Developed] : well developed [Well Nourished] : well nourished [de-identified] : Examination of the neck is as follows: \par Inspection: no scars, no erythema, no ecchymosis, no palpable masses. \par Palpation (Bilateral): paracervical spasm, paracervical tenderness, but no trapezial spasm, no trapezial tenderness, no rhomboid spasm, no rhomboid tenderness. \par ROM: diminished ROM in all planes. Pain at extremes of: flexion, extension, rotation to left, rotation to right. Pain radiates to left arm with motion. Numbness radiates to left arm with motion. Pain radiates to right arm with motion. Numbness radiates to right arm with motion. \par Strength: motor exam is non-focal throughout both upper extremities. \par \par Examination of the spine is as follows: \par Inspection: incisions clean and dry, skin intact, no atrophy, no erythema, no ecchymosis, no palpable masses, no swelling, no sign of infection, no induration. \par Palpation (Bilateral): lumbar paraspinal spasm, lumbar paraspinal tenderness, sciatic nerve tenderness, numbness/tingling of leg, numbness/tingling of foot, but no thoracic paraspinal spasm. \par ROM: diminished ROM in all planes. \par Strength: motor exam is non-focal throughout both lower extremities. \par Testing (Bilateral): positive sitting straight leg raise. \par \par Gait: mildly antalgic,   Does not use an assistive device.

## 2023-02-08 NOTE — HISTORY OF PRESENT ILLNESS
[FreeTextEntry1] : ORIGINAL PRESENTATION: Mr. Irizarry is a 77-year-old male who continues to follow up with us for chronic cervical and lumbar pain radiating into the upper extremities related to a 1995 work injury. He has undergone cervical fusion without significant improvement. Medication management has continued as well as intermittent interventional procedures in the form of trigger point injections as well as cervical epidural procedures. He remains content with his response to medication management in the form of oxycodone IR which is consumed up to three times daily.\par \par He states his pain is of undetermined origin, moderate to severe, constant, in no typical pattern. He states it is pressure-like, dull aching, and throbbing. He denies any weakness in the upper lower extremities. He states that lying down standing sitting walking and exercise increases his pain. Denies any recent change in bowel or bladder habits.\par \par ACTIVITIES: He states he can walk 3 blocks before his pain begins. He can sit and stand for 30 minutes before his pain begins. In a day he sometimes has lie down because of pain. He currently uses no assistive walking device.\par \par PRIOR PAIN TREATMENTS: Surgery by Dr. Henderson in 1996 gave him moderate relief.\par \par PRIOR PAIN MEDICATIONS: Tylenol, cyclobenzaprine, oxycodone, Percocet.\par \par PATIENT PRESENTS FOR FOLLOW UP: For chronic cervical and lumbar pain radiating into the upper and lower extremities related to a 1995 work injury. He recently underwent an L1-2 laminectomy and L3-5 decompression w/ conflex with Dr. Henderson on 12/22/22. He is still in pain following the surgery but states there has been a slight decrease in his pain. He is still sore and healing from the surgery and is unable to assess the amount of relief received post surgery. \par \par He is medically managed in our office on Oxycodone 15 mgTID. Patient denies any adverse reactions or side effects. He will continue unchanged since he is still healing from surgery. We will discuss decreasing his medication upon his next visit. The goal is to wean him off completely if possible.

## 2023-02-14 ENCOUNTER — APPOINTMENT (OUTPATIENT)
Dept: NEUROSURGERY | Facility: CLINIC | Age: 78
End: 2023-02-14
Payer: OTHER MISCELLANEOUS

## 2023-02-14 PROCEDURE — 99024 POSTOP FOLLOW-UP VISIT: CPT

## 2023-02-14 NOTE — HISTORY OF PRESENT ILLNESS
[FreeTextEntry1] : Mr. MUSTAFA presents for second post-operative encounter. He developed a fever 1-3 days after surgery and was issued antibiotic treatment by the hospital discharge staff for a presumed pulmonary illness. He has since recovered and notes that he is doing well overall. With regards to his surgical recovery, he notes a component mild isolated lumbago which he is in the process of recovering. Radicular features along the posterior aspect of the bilateral lower extremities has since resolved. He has been able to ambulate without an assistive device. \par \par Overall, he is quite pleased with his improvements thus far. We will continue to monitor his post-operative recovery over the coming weeks and he will contact me with any issues if they were to arise.

## 2023-02-14 NOTE — PHYSICAL EXAM
[FreeTextEntry1] : Incision site appears to be clean and well healed, no signs of infection\par Gait steady

## 2023-02-14 NOTE — ASSESSMENT
[FreeTextEntry1] : 76 yo M presents for second post-operative encounter s/p L1-2 laminectomy, L3-5 decompression w coflex. Patient is doing well post-operatively. He is to ambulate daily as tolerated; avoid heavy lifting/pushing/pulling/carrying.\par \par All questions and concerns have been answered at this time. He is to return in 8-10 weeks for his third post-operative encounter and can contact me with any questions or concerns in the interim.\par \par I, Bhanu Nation, attest that this documentation has been prepared under the direction and in the presence of Provider Chung Henderson MD\par  \par Thank you for allowing me to assist in the care of this patient. \par  \par Chung Henderson MD\par \par Board Certified , Neurosurgeon\par \par

## 2023-03-09 ENCOUNTER — APPOINTMENT (OUTPATIENT)
Dept: PAIN MANAGEMENT | Facility: CLINIC | Age: 78
End: 2023-03-09
Payer: OTHER MISCELLANEOUS

## 2023-03-09 VITALS
HEIGHT: 70 IN | BODY MASS INDEX: 34.36 KG/M2 | WEIGHT: 240 LBS | DIASTOLIC BLOOD PRESSURE: 89 MMHG | SYSTOLIC BLOOD PRESSURE: 152 MMHG | HEART RATE: 109 BPM

## 2023-03-09 PROCEDURE — 99214 OFFICE O/P EST MOD 30 MIN: CPT

## 2023-03-09 PROCEDURE — 99072 ADDL SUPL MATRL&STAF TM PHE: CPT

## 2023-03-09 NOTE — ASSESSMENT
[FreeTextEntry1] : Mr. Irizarry is a 77-year-old gentleman who sustained a work related accident in 1995 affecting his neck and lower back. I will decrease his Oxycodone 15 mg from TID to BID dosing.  He will continue with cyclobenzaprine and hydroxyzine unchanged. He will follow up in 4 weeks for medication refill.  All this patients questions were answered and the conversation was understood well.\par \par I explained the risk of addiction, tolerance and withdrawals. UDS will be done randomly and a drug agreement was signed.\par \par I advised the patient they must keep their medication under a lock and key, or in a safe place away from children or other individuals. This medication given is solely for the patient and under no circumstances to be shared. Patient verbalized this and is in agreement with the aforementioned. I explained the risk of addiction, tolerance, and withdrawals. UDS will be done randomly and a drug agreement was signed.\par \par I, Betzaida Worrell, attest that this documentation has been prepared under the direction and in the presence of Provider Kathie Phillips MD.\par \par \par Thank you for allowing me to assist in the management of this patient. \par \par \par Best Regards, \par \par \par Kathie Phillips M.D., [facility]\par \par \par Diplomate, American Board of Physical Medicine and Rehabilitation\par Diplomate, American Board of Pain Medicine \par Diplomate, American Board of Pain Management\par

## 2023-03-09 NOTE — HISTORY OF PRESENT ILLNESS
[FreeTextEntry1] : ORIGINAL PRESENTATION: Mr. Irizarry is a 77-year-old male who continues to follow up with us for chronic cervical and lumbar pain radiating into the upper extremities related to a 1995 work injury. He has undergone cervical fusion without significant improvement. Medication management has continued as well as intermittent interventional procedures in the form of trigger point injections as well as cervical epidural procedures. He remains content with his response to medication management in the form of oxycodone IR which is consumed up to three times daily.\par \par He states his pain is of undetermined origin, moderate to severe, constant, in no typical pattern. He states it is pressure-like, dull aching, and throbbing. He denies any weakness in the upper lower extremities. He states that lying down standing sitting walking and exercise increases his pain. Denies any recent change in bowel or bladder habits.\par \par ACTIVITIES: He states he can walk 3 blocks before his pain begins. He can sit and stand for 30 minutes before his pain begins. In a day he sometimes has lie down because of pain. He currently uses no assistive walking device.\par \par PRIOR PAIN TREATMENTS: Surgery by Dr. Henderson in 1996 gave him moderate relief.\par \par PRIOR PAIN MEDICATIONS: Tylenol, cyclobenzaprine, oxycodone, Percocet.\par \par PATIENT PRESENTS FOR FOLLOW UP: He is under our care for chronic cervical and lumbar pain radiating into the upper and lower extremities related to a 1995 work injury. He previously underwent  an L1-2 laminectomy and L3-5 decompression w/ conflex with Dr. Henderson on 12/22/22. He was provided relief following the surgery but is frustrated because he still has difficulty ambulating. \par \par He is medically managed in our office on Oxycodone 15 mg TID. Patient denies any adverse reactions or side effects. Decreasing the dosage from TID to BID was discussed today and while he is hesitant, he is agreeable.   \par

## 2023-03-09 NOTE — PHYSICAL EXAM
[Normal Coordination] : normal coordination [Normal DTR UE/LE] : normal DTR UE/LE  [Normal Sensation] : normal sensation [Normal Mood and Affect] : normal mood and affect [Orientated] : orientated [Able to Communicate] : able to communicate [Well Developed] : well developed [Well Nourished] : well nourished [de-identified] : Examination of the neck is as follows: \par Inspection: no scars, no erythema, no ecchymosis, no palpable masses. \par Palpation (Bilateral): paracervical spasm, paracervical tenderness, but no trapezial spasm, no trapezial tenderness, no rhomboid spasm, no rhomboid tenderness. \par ROM: diminished ROM in all planes. Pain at extremes of: flexion, extension, rotation to left, rotation to right. Pain radiates to left arm with motion. Numbness radiates to left arm with motion. Pain radiates to right arm with motion. Numbness radiates to right arm with motion. \par Strength: motor exam is non-focal throughout both upper extremities. \par \par Examination of the spine is as follows: \par Inspection: incisions clean and dry, skin intact, no atrophy, no erythema, no ecchymosis, no palpable masses, no swelling, no sign of infection, no induration. \par Palpation (Bilateral): lumbar paraspinal spasm, lumbar paraspinal tenderness, sciatic nerve tenderness, numbness/tingling of leg, numbness/tingling of foot, but no thoracic paraspinal spasm. \par ROM: diminished ROM in all planes. \par Strength: motor exam is non-focal throughout both lower extremities. \par Testing (Bilateral): positive sitting straight leg raise. \par \par Gait: mildly antalgic,   Does not use an assistive device.

## 2023-03-10 ENCOUNTER — APPOINTMENT (OUTPATIENT)
Dept: UROLOGY | Facility: CLINIC | Age: 78
End: 2023-03-10

## 2023-03-23 ENCOUNTER — LABORATORY RESULT (OUTPATIENT)
Age: 78
End: 2023-03-23

## 2023-03-28 ENCOUNTER — NON-APPOINTMENT (OUTPATIENT)
Age: 78
End: 2023-03-28

## 2023-03-30 LAB
TESTOST FREE SERPL-MCNC: 0.7 PG/ML
TESTOST SERPL-MCNC: 413 NG/DL

## 2023-04-06 ENCOUNTER — APPOINTMENT (OUTPATIENT)
Dept: PAIN MANAGEMENT | Facility: CLINIC | Age: 78
End: 2023-04-06

## 2023-04-07 ENCOUNTER — APPOINTMENT (OUTPATIENT)
Dept: UROLOGY | Facility: CLINIC | Age: 78
End: 2023-04-07

## 2023-04-07 DIAGNOSIS — S24.2XXA INJURY OF NERVE ROOT OF THORACIC SPINE, INITIAL ENCOUNTER: ICD-10-CM

## 2023-04-17 ENCOUNTER — RX RENEWAL (OUTPATIENT)
Age: 78
End: 2023-04-17

## 2023-04-18 ENCOUNTER — APPOINTMENT (OUTPATIENT)
Dept: NEUROSURGERY | Facility: CLINIC | Age: 78
End: 2023-04-18
Payer: OTHER MISCELLANEOUS

## 2023-04-18 DIAGNOSIS — Z09 ENCOUNTER FOR FOLLOW-UP EXAMINATION AFTER COMPLETED TREATMENT FOR CONDITIONS OTHER THAN MALIGNANT NEOPLASM: ICD-10-CM

## 2023-04-18 PROCEDURE — 99214 OFFICE O/P EST MOD 30 MIN: CPT

## 2023-04-18 RX ORDER — CELECOXIB 200 MG/1
200 CAPSULE ORAL
Qty: 30 | Refills: 0 | Status: ACTIVE | COMMUNITY
Start: 2023-04-18 | End: 1900-01-01

## 2023-04-18 NOTE — HISTORY OF PRESENT ILLNESS
[FreeTextEntry1] : 76 yo M presents for a post-operative encounter, s/p L1-2, L3-5 decompressive laminectomy with Coflex. He notes significant improvements with regards to his neurogenic claudication complaints into the lower extremities. He no longer experiences numbness, tingling, or shooting pain complaints into the legs. A report of isolated lumbago noted with ambulating long distances. \par \par (-) bladder/bowel dysfunction, radicular complaints, LE swelling, chest pain, shortness of breath noted.\par \par PHYSICAL EXAM: \par \par Constitutional: Well appearing, no distress\par HEENT: Normocephalic Atraumatic\par Psychiatric: Alert and oriented to all spheres, normal mood\par Pulmonary: No respiratory distress\par \par Neurologic: \par CN II-XII grossly intact\par Palpation: no pain to palpation \par Strength: Full strength in all major muscle groups\par Sensation: Full sensation to light touch in all extremities\par Reflexes: \par  2+ patellar\par  2+ ankle jerk\par \par Signs:\par SLR negative\par \par Gait: steady, walking w/o assistance.\par \par \par

## 2023-04-19 ENCOUNTER — APPOINTMENT (OUTPATIENT)
Dept: PAIN MANAGEMENT | Facility: CLINIC | Age: 78
End: 2023-04-19
Payer: OTHER MISCELLANEOUS

## 2023-04-19 VITALS
SYSTOLIC BLOOD PRESSURE: 155 MMHG | WEIGHT: 240 LBS | DIASTOLIC BLOOD PRESSURE: 89 MMHG | HEART RATE: 99 BPM | BODY MASS INDEX: 34.36 KG/M2 | HEIGHT: 70 IN

## 2023-04-19 PROCEDURE — 99214 OFFICE O/P EST MOD 30 MIN: CPT

## 2023-04-19 NOTE — PHYSICAL EXAM
[Normal Coordination] : normal coordination [Normal DTR UE/LE] : normal DTR UE/LE  [Normal Sensation] : normal sensation [Normal Mood and Affect] : normal mood and affect [Orientated] : orientated [Able to Communicate] : able to communicate [Well Developed] : well developed [Well Nourished] : well nourished [de-identified] : Examination of the neck is as follows: \par Inspection: no scars, no erythema, no ecchymosis, no palpable masses. \par Palpation (Bilateral): paracervical spasm, paracervical tenderness, but no trapezial spasm, no trapezial tenderness, no rhomboid spasm, no rhomboid tenderness. \par ROM: diminished ROM in all planes. Pain at extremes of: flexion, extension, rotation to left, rotation to right. Pain radiates to left arm with motion. Numbness radiates to left arm with motion. Pain radiates to right arm with motion. Numbness radiates to right arm with motion. \par Strength: motor exam is non-focal throughout both upper extremities. \par \par Examination of the spine is as follows: \par Inspection: incisions clean and dry, skin intact, no atrophy, no erythema, no ecchymosis, no palpable masses, no swelling, no sign of infection, no induration. \par Palpation (Bilateral): lumbar paraspinal spasm, lumbar paraspinal tenderness, sciatic nerve tenderness, numbness/tingling of leg, numbness/tingling of foot, but no thoracic paraspinal spasm. \par ROM: diminished ROM in all planes. \par Strength: motor exam is non-focal throughout both lower extremities. \par Testing (Bilateral): positive sitting straight leg raise. \par \par Gait: mildly antalgic,   Does not use an assistive device.

## 2023-04-19 NOTE — HISTORY OF PRESENT ILLNESS
[FreeTextEntry1] : ORIGINAL PRESENTATION: Mr. Irizarry is a 77-year-old male who continues to follow up with us for chronic cervical and lumbar pain radiating into the upper extremities related to a 1995 work injury. He has undergone cervical fusion without significant improvement. Medication management has continued as well as intermittent interventional procedures in the form of trigger point injections as well as cervical epidural procedures. He remains content with his response to medication management in the form of oxycodone IR which is consumed up to three times daily.\par \par He states his pain is of undetermined origin, moderate to severe, constant, in no typical pattern. He states it is pressure-like, dull aching, and throbbing. He denies any weakness in the upper lower extremities. He states that lying down standing sitting walking and exercise increases his pain. Denies any recent change in bowel or bladder habits.\par \par ACTIVITIES: He states he can walk 3 blocks before his pain begins. He can sit and stand for 30 minutes before his pain begins. In a day he sometimes has lie down because of pain. He currently uses no assistive walking device.\par \par PRIOR PAIN TREATMENTS: Surgery by Dr. Henderson in 1996 gave him moderate relief.\par \par PRIOR PAIN MEDICATIONS: Tylenol, cyclobenzaprine, oxycodone, Percocet.\par \par PATIENT PRESENTS FOR FOLLOW UP: He is under our care for chronic cervical and lumbar pain radiating into the upper and lower extremities related to a 1995 work injury. He previously underwent  an L1-2 laminectomy and L3-5 decompression w/ conflex with Dr. Henderson on 12/22/22. He states that his back pain is slowly improving. There is still soreness with prolonged walking.  He is also complaining of some stiffness and pain in his neck specially when playing checkers with his granddaughter he states this is tolerable.\par \par He is medically managed in our office on Oxycodone 15 mg BID. Decreasing the medication further was discussed today but he would like to remain unchanged at this time. We will revisit the decrease upon his next appointment. Patient denies any adverse reactions or side effects.  \par

## 2023-04-19 NOTE — DATA REVIEWED
[FreeTextEntry1] : 12/2020: CT L spine: moderate-severe lumbar stenosis, L4-5. Severe spinal stenosis along with foraminal narrowing as well at that site.\par \par SOAPP: moderate on 4/19/23\par Patient has a combination of moderate rise SOAP and no risk factors. UDS would be repeated randomly every quarter.\par \par UDS: 5/16/22 + oxycodone, cotinine.\par \par [place] REGISTRY: Checked\par

## 2023-04-19 NOTE — ASSESSMENT
[FreeTextEntry1] : Mr. Irizarry is a 77-year-old gentleman who sustained a work related accident in 1995 affecting his neck and lower back. He will continue with Oxycodone 15 mg BID, cyclobenzaprine and hydroxyzine unchanged. Patient will follow up in 4 weeks for medication refill.  All this patients questions were answered and the conversation was understood well.\par \par I explained the risk of addiction, tolerance and withdrawals. UDS will be done randomly and a drug agreement was signed.\par \par I advised the patient they must keep their medication under a lock and key, or in a safe place away from children or other individuals. This medication given is solely for the patient and under no circumstances to be shared. Patient verbalized this and is in agreement with the aforementioned. I explained the risk of addiction, tolerance, and withdrawals. UDS will be done randomly and a drug agreement was signed.\par \par I, Betzaida Worrell, attest that this documentation has been prepared under the direction and in the presence of Provider Kathie Phillips MD.\par \par \par Thank you for allowing me to assist in the management of this patient. \par \par \par Best Regards, \par \par \par Kathie Phillips M.D., FAAPMR\par \par \par Diplomate, American Board of Physical Medicine and Rehabilitation\par Diplomate, American Board of Pain Medicine \par Diplomate, American Board of Pain Management\par

## 2023-05-09 NOTE — ED ADULT NURSE NOTE - NS ED NURSE LEVEL OF CONSCIOUSNESS SPEECH
Speaking Coherently Skin Substitute Injection Text: The defect edges were debeveled with a #15 scalpel blade.  Given the location of the defect, shape of the defect and the proximity to free margins a skin substitute micronized graft was deemed most appropriate.  The entire vial contents were admixed with 3.0ccs of sterile saline and then injected subcutaneously throughout the entire wound bed.

## 2023-05-17 ENCOUNTER — APPOINTMENT (OUTPATIENT)
Dept: PAIN MANAGEMENT | Facility: CLINIC | Age: 78
End: 2023-05-17
Payer: OTHER MISCELLANEOUS

## 2023-05-17 VITALS
WEIGHT: 240 LBS | DIASTOLIC BLOOD PRESSURE: 86 MMHG | HEART RATE: 89 BPM | SYSTOLIC BLOOD PRESSURE: 142 MMHG | BODY MASS INDEX: 34.36 KG/M2 | HEIGHT: 70 IN

## 2023-05-17 PROCEDURE — 99214 OFFICE O/P EST MOD 30 MIN: CPT

## 2023-05-17 NOTE — PHYSICAL EXAM
[Normal Coordination] : normal coordination [Normal DTR UE/LE] : normal DTR UE/LE  [Normal Sensation] : normal sensation [Normal Mood and Affect] : normal mood and affect [Orientated] : orientated [Able to Communicate] : able to communicate [Well Developed] : well developed [Well Nourished] : well nourished [de-identified] : Examination of the neck is as follows: \par Inspection: no scars, no erythema, no ecchymosis, no palpable masses. \par Palpation (Bilateral): paracervical spasm, paracervical tenderness, but no trapezial spasm, no trapezial tenderness, no rhomboid spasm, no rhomboid tenderness. \par ROM: diminished ROM in all planes. Pain at extremes of: flexion, extension, rotation to left, rotation to right. Pain radiates to left arm with motion. Numbness radiates to left arm with motion. Pain radiates to right arm with motion. Numbness radiates to right arm with motion. \par Strength: motor exam is non-focal throughout both upper extremities. \par \par Examination of the spine is as follows: \par Inspection: incisions clean and dry, skin intact, no atrophy, no erythema, no ecchymosis, no palpable masses, no swelling, no sign of infection, no induration. \par Palpation (Bilateral): lumbar paraspinal spasm, lumbar paraspinal tenderness, sciatic nerve tenderness, numbness/tingling of leg, numbness/tingling of foot, but no thoracic paraspinal spasm. \par ROM: diminished ROM in all planes. \par Strength: motor exam is non-focal throughout both lower extremities. \par Testing (Bilateral): positive sitting straight leg raise. \par \par Gait: mildly antalgic,   Does not use an assistive device.

## 2023-05-17 NOTE — ASSESSMENT
[FreeTextEntry1] : Mr. Irizarry is a 77-year-old gentleman who sustained a work related accident in 1995 affecting his neck and lower back. He will continue with Oxycodone 15 mg BID, cyclobenzaprine and hydroxyzine unchanged. Patient will follow up in 4 weeks for medication refill.  We will repeat a UDS next visit.  All this patients questions were answered and the conversation was understood well.\par \par I explained the risk of addiction, tolerance and withdrawals. UDS will be done randomly and a drug agreement was signed.\par \par I advised the patient they must keep their medication under a lock and key, or in a safe place away from children or other individuals. This medication given is solely for the patient and under no circumstances to be shared. Patient verbalized this and is in agreement with the aforementioned. I explained the risk of addiction, tolerance, and withdrawals. UDS will be done randomly and a drug agreement was signed.\par \par I, Betzaida Worrell, attest that this documentation has been prepared under the direction and in the presence of Provider Kathie Phillips MD.\par \par \par Thank you for allowing me to assist in the management of this patient. \par \par \par Best Regards, \par \par \par Kathie Phillips M.D., [facility]\par \par \par Diplomate, American Board of Physical Medicine and Rehabilitation\par Diplomate, American Board of Pain Medicine \par Diplomate, American Board of Pain Management\par

## 2023-05-17 NOTE — HISTORY OF PRESENT ILLNESS
[FreeTextEntry1] : ORIGINAL PRESENTATION: Mr. Irizarry is a 77-year-old male who continues to follow up with us for chronic cervical and lumbar pain radiating into the upper extremities related to a 1995 work injury. He has undergone cervical fusion without significant improvement. Medication management has continued as well as intermittent interventional procedures in the form of trigger point injections as well as cervical epidural procedures. He remains content with his response to medication management in the form of oxycodone IR which is consumed up to three times daily.\par \par He states his pain is of undetermined origin, moderate to severe, constant, in no typical pattern. He states it is pressure-like, dull aching, and throbbing. He denies any weakness in the upper lower extremities. He states that lying down standing sitting walking and exercise increases his pain. Denies any recent change in bowel or bladder habits.\par \par ACTIVITIES: He states he can walk 3 blocks before his pain begins. He can sit and stand for 30 minutes before his pain begins. In a day he sometimes has lie down because of pain. He currently uses no assistive walking device.\par \par PRIOR PAIN TREATMENTS: Surgery by Dr. Henderson in 1996 gave him moderate relief.\par \par PRIOR PAIN MEDICATIONS: Tylenol, cyclobenzaprine, oxycodone, Percocet.\par \par PATIENT PRESENTS FOR FOLLOW UP: He is under our care for chronic cervical and lumbar pain radiating into the upper and lower extremities related to a 1995 work injury. He previously underwent  an L1-2 laminectomy and L3-5 decompression w/ conflex with Dr. Henderson on 12/22/22. He states that his back pain is slowly improving. There is still soreness with prolonged walking. He is also complaining of some stiffness and pain in his neck specially when playing checkers with his granddaughter he states this is tolerable.\par \par He is medically managed in our office on Oxycodone 15 mg BID. Patient denies any adverse reactions or side effects. \par

## 2023-05-18 ENCOUNTER — TRANSCRIPTION ENCOUNTER (OUTPATIENT)
Age: 78
End: 2023-05-18

## 2023-05-18 ENCOUNTER — OUTPATIENT (OUTPATIENT)
Dept: INPATIENT UNIT | Facility: HOSPITAL | Age: 78
LOS: 1 days | Discharge: HOME | End: 2023-05-18
Payer: MEDICARE

## 2023-05-18 VITALS
SYSTOLIC BLOOD PRESSURE: 125 MMHG | HEART RATE: 80 BPM | TEMPERATURE: 98 F | WEIGHT: 244.93 LBS | DIASTOLIC BLOOD PRESSURE: 73 MMHG | HEIGHT: 70 IN | RESPIRATION RATE: 18 BRPM

## 2023-05-18 VITALS
HEART RATE: 74 BPM | SYSTOLIC BLOOD PRESSURE: 135 MMHG | RESPIRATION RATE: 20 BRPM | DIASTOLIC BLOOD PRESSURE: 77 MMHG | OXYGEN SATURATION: 96 %

## 2023-05-18 DIAGNOSIS — Z90.89 ACQUIRED ABSENCE OF OTHER ORGANS: Chronic | ICD-10-CM

## 2023-05-18 DIAGNOSIS — I25.10 ATHEROSCLEROTIC HEART DISEASE OF NATIVE CORONARY ARTERY WITHOUT ANGINA PECTORIS: Chronic | ICD-10-CM

## 2023-05-18 DIAGNOSIS — Z95.810 PRESENCE OF AUTOMATIC (IMPLANTABLE) CARDIAC DEFIBRILLATOR: Chronic | ICD-10-CM

## 2023-05-18 DIAGNOSIS — Z12.11 ENCOUNTER FOR SCREENING FOR MALIGNANT NEOPLASM OF COLON: ICD-10-CM

## 2023-05-18 PROCEDURE — C1889: CPT

## 2023-05-18 PROCEDURE — 88305 TISSUE EXAM BY PATHOLOGIST: CPT

## 2023-05-18 PROCEDURE — 88305 TISSUE EXAM BY PATHOLOGIST: CPT | Mod: 26

## 2023-05-18 NOTE — CHART NOTE - NSCHARTNOTEFT_GEN_A_CORE
PACU ANESTHESIA ADMISSION NOTE      Procedure: Colonoscopy, biopsy, polypectomy, placement of clips  Post op diagnosis:  colon polyp        __x__  Patent Airway    _x___  Full return of protective reflexes    _x___  Full recovery from anesthesia / back to baseline     Vitals:   T:  97.8         R:   14               BP:     110/70             Sat:     98%              P: 70      Mental Status:  __x__ Awake   _____ Alert   _____ Drowsy   _____ Sedated    Nausea/Vomiting:  ____ NO  ______Yes,   See Post - Op Orders          Pain Scale (0-10):  _____    Treatment: ____ None    ___x_ See Post - Op/PCA Orders    Post - Operative Fluids:   ____ Oral   __x__ See Post - Op Orders    Plan: Discharge:   __x__Home       _____Floor     _____Critical Care    _____  Other:_________________    Comments: Pt tolerated procedure well, no anesthesia related complications. Care of pt endorsed to PACU, report given to PACU RN. Discharge when criteria are met.

## 2023-05-18 NOTE — ASU PATIENT PROFILE, ADULT - AS SC BRADEN FRICTION
Next appt NONE  Last appt 8/14/19    Refill request for  alendronate (FOSAMAX) 70 MG tablet 12 tablet 0 4/9/2020     Sig: TAKE 1 TABLET EVERY 7 DAYS IN THE MORNING 30 MINUTES BEFORE EATING / DRINKING, WITH 8 OUNCES OF WATER. STAY UPRIGHT FOR 30 MINUTES    Sent to pharmacy as: Alendronate Sodium 70 MG Oral Tablet (FOSAMAX)    Class: Eprescribe    Cosign for Ordering: Accepted by DENNY Marrufo on 4/9/2020 12:44 PM    E-Prescribing Status: Receipt confirmed by pharmacy (4/9/2020 12:04 PM CDT)      LAST DEXA 12/20/19    Refill unable to be completed per standing protocol due to; OKAY FOR RENEWAL?  Orders pended, and routed to provider for approval.   Please route any notes back to your nursing pool via patient call NOT Rx Auth.  Thank you, Refill Center Staff    
(3) no apparent problem

## 2023-05-18 NOTE — ASU PATIENT PROFILE, ADULT - FALL HARM RISK - UNIVERSAL INTERVENTIONS
Bed in lowest position, wheels locked, appropriate side rails in place/Call bell, personal items and telephone in reach/Instruct patient to call for assistance before getting out of bed or chair/Non-slip footwear when patient is out of bed/Causey to call system/Physically safe environment - no spills, clutter or unnecessary equipment/Purposeful Proactive Rounding/Room/bathroom lighting operational, light cord in reach

## 2023-05-18 NOTE — ASU DISCHARGE PLAN (ADULT/PEDIATRIC) - NS MD DC FALL RISK RISK
For information on Fall & Injury Prevention, visit: [email or web address] OR  [email or web address] OR  https://www.cdc.gov/steadi/patient.html

## 2023-05-19 LAB — SURGICAL PATHOLOGY STUDY: SIGNIFICANT CHANGE UP

## 2023-05-22 DIAGNOSIS — K64.8 OTHER HEMORRHOIDS: ICD-10-CM

## 2023-05-22 DIAGNOSIS — K64.4 RESIDUAL HEMORRHOIDAL SKIN TAGS: ICD-10-CM

## 2023-05-22 DIAGNOSIS — D12.3 BENIGN NEOPLASM OF TRANSVERSE COLON: ICD-10-CM

## 2023-05-22 DIAGNOSIS — K57.30 DIVERTICULOSIS OF LARGE INTESTINE WITHOUT PERFORATION OR ABSCESS WITHOUT BLEEDING: ICD-10-CM

## 2023-05-22 DIAGNOSIS — Z88.5 ALLERGY STATUS TO NARCOTIC AGENT: ICD-10-CM

## 2023-05-22 DIAGNOSIS — D64.9 ANEMIA, UNSPECIFIED: ICD-10-CM

## 2023-06-20 ENCOUNTER — OUTPATIENT (OUTPATIENT)
Dept: OUTPATIENT SERVICES | Facility: HOSPITAL | Age: 78
LOS: 1 days | End: 2023-06-20
Payer: MEDICARE

## 2023-06-20 DIAGNOSIS — Z90.89 ACQUIRED ABSENCE OF OTHER ORGANS: Chronic | ICD-10-CM

## 2023-06-20 DIAGNOSIS — I25.10 ATHEROSCLEROTIC HEART DISEASE OF NATIVE CORONARY ARTERY WITHOUT ANGINA PECTORIS: Chronic | ICD-10-CM

## 2023-06-20 DIAGNOSIS — Z95.810 PRESENCE OF AUTOMATIC (IMPLANTABLE) CARDIAC DEFIBRILLATOR: Chronic | ICD-10-CM

## 2023-06-20 DIAGNOSIS — R05.9 COUGH, UNSPECIFIED: ICD-10-CM

## 2023-06-20 PROCEDURE — 71046 X-RAY EXAM CHEST 2 VIEWS: CPT

## 2023-06-20 PROCEDURE — 71046 X-RAY EXAM CHEST 2 VIEWS: CPT | Mod: 26

## 2023-06-21 DIAGNOSIS — R05.9 COUGH, UNSPECIFIED: ICD-10-CM

## 2023-06-23 ENCOUNTER — LABORATORY RESULT (OUTPATIENT)
Age: 78
End: 2023-06-23

## 2023-06-23 ENCOUNTER — APPOINTMENT (OUTPATIENT)
Dept: PAIN MANAGEMENT | Facility: CLINIC | Age: 78
End: 2023-06-23
Payer: OTHER MISCELLANEOUS

## 2023-06-23 VITALS
HEART RATE: 87 BPM | BODY MASS INDEX: 34.36 KG/M2 | DIASTOLIC BLOOD PRESSURE: 84 MMHG | HEIGHT: 70 IN | WEIGHT: 240 LBS | SYSTOLIC BLOOD PRESSURE: 152 MMHG

## 2023-06-23 LAB
AMP / AMPHETAMINE: NEGATIVE
BAR / SECOBARBITAL: NEGATIVE
BUP / BUPRENORPHINE: NEGATIVE
BZO / OXAZEPAM: NEGATIVE
COC / COCAINE: NEGATIVE
CREATININE: 50 MG/DL
MDMA / METHYLENEDIOXYMETHAMPHETAMINE: NEGATIVE
MET / METHAMPHETAMINES: NEGATIVE
MOP / MORPHINE: NEGATIVE
MTD / METHADONE: NEGATIVE
OXY / OXYCODONE: POSITIVE
PCP / PHENCYCLIDINE: NEGATIVE
PH: 5
SPECIFIC GRAVITY: 1.02
TEMPERATURE: 92 F
THC / MARIJUANA: NEGATIVE

## 2023-06-23 PROCEDURE — 80305 DRUG TEST PRSMV DIR OPT OBS: CPT | Mod: QW

## 2023-06-23 PROCEDURE — 99214 OFFICE O/P EST MOD 30 MIN: CPT | Mod: ACP

## 2023-06-23 NOTE — PHYSICAL EXAM
[Normal Coordination] : normal coordination [Normal DTR UE/LE] : normal DTR UE/LE  [Normal Sensation] : normal sensation [Normal Mood and Affect] : normal mood and affect [Orientated] : orientated [Able to Communicate] : able to communicate [Well Developed] : well developed [Well Nourished] : well nourished [de-identified] : Examination of the neck is as follows: \par Inspection: no scars, no erythema, no ecchymosis, no palpable masses. \par Palpation (Bilateral): paracervical spasm, paracervical tenderness, but no trapezial spasm, no trapezial tenderness, no rhomboid spasm, no rhomboid tenderness. \par ROM: diminished ROM in all planes. Pain at extremes of: flexion, extension, rotation to left, rotation to right. Pain radiates to left arm with motion. Numbness radiates to left arm with motion. Pain radiates to right arm with motion. Numbness radiates to right arm with motion. \par Strength: motor exam is non-focal throughout both upper extremities. \par \par Examination of the spine is as follows: \par Inspection: incisions clean and dry, skin intact, no atrophy, no erythema, no ecchymosis, no palpable masses, no swelling, no sign of infection, no induration. \par Palpation (Bilateral): lumbar paraspinal spasm, lumbar paraspinal tenderness, sciatic nerve tenderness, numbness/tingling of leg, numbness/tingling of foot, but no thoracic paraspinal spasm. \par ROM: diminished ROM in all planes. \par Strength: motor exam is non-focal throughout both lower extremities. \par Testing (Bilateral): positive sitting straight leg raise. \par \par Gait: mildly antalgic,   Does not use an assistive device.

## 2023-06-23 NOTE — HISTORY OF PRESENT ILLNESS
[FreeTextEntry1] : ORIGINAL PRESENTATION: Mr. Irizarry is a 77-year-old male who continues to follow up with us for chronic cervical and lumbar pain radiating into the upper extremities related to a 1995 work injury. He has undergone cervical fusion without significant improvement. Medication management has continued as well as intermittent interventional procedures in the form of trigger point injections as well as cervical epidural procedures. He remains content with his response to medication management in the form of oxycodone IR which is consumed up to three times daily.\par \par He states his pain is of undetermined origin, moderate to severe, constant, in no typical pattern. He states it is pressure-like, dull aching, and throbbing. He denies any weakness in the upper lower extremities. He states that lying down standing sitting walking and exercise increases his pain. Denies any recent change in bowel or bladder habits.\par \par ACTIVITIES: He states he can walk 3 blocks before his pain begins. He can sit and stand for 30 minutes before his pain begins. In a day he sometimes has lie down because of pain. He currently uses no assistive walking device.\par \par PRIOR PAIN TREATMENTS: Surgery by Dr. Henderson in 1996 gave him moderate relief.\par \par PRIOR PAIN MEDICATIONS: Tylenol, cyclobenzaprine, oxycodone, Percocet.\par \par PATIENT PRESENTS FOR FOLLOW UP: He is under our care for chronic cervical and lumbar pain radiating into the upper and lower extremities related to a 1995 work injury. He previously underwent an L1-2 laminectomy and L3-5 decompression w/ conflex with Dr. Henderson on 12/22/22. Overall , he states he is making steady improvements. He does note stiffness and spasms. He feels as though his low back is very tight.  There is still soreness with prolonged walking. We discussed trialing a series of lumbar TPIs and he would like to think about it. He is medically managed in our office on Oxycodone 15 mg BID. He also utilizes cyclobenzaprine and hydroxyzine. Patient denies any adverse reactions or side effects. \par

## 2023-06-23 NOTE — ASSESSMENT
[FreeTextEntry1] : Mr. Irizarry is a 77-year-old gentleman who sustained a work related accident in 1995 affecting his neck and lower back. We will continue to prescribe Oxycodone 15 mg BID, cyclobenzaprine and hydroxyzine unchanged. He would like to consider Lumbar TPIs on a future visit. Patient will f/u in 4 weeks for further evaluation.  All this patients questions were answered and the conversation was understood well.\par \par I explained the risk of addiction, tolerance and withdrawals. UDS will be done randomly and a drug agreement was signed.\par \par I advised the patient they must keep their medication under a lock and key, or in a safe place away from children or other individuals. This medication given is solely for the patient and under no circumstances to be shared. Patient verbalized this and is in agreement with the aforementioned. I explained the risk of addiction, tolerance, and withdrawals. UDS will be done randomly and a drug agreement was signed.\par \par Overall there is at least a 30-50% reduction in pain with the prescribed analgesics. The patient denies any adverse side effects due to the medication (sleeping disturbance, constipation, sleepiness, hallucinations and/or urination problems).\par \par I have consulted the  registry for the purpose of reviewing the patient's controlled substance.\par \par Deon Downing PA-C\par Kathie Phillips MD\par \par

## 2023-06-28 ENCOUNTER — APPOINTMENT (OUTPATIENT)
Dept: UROLOGY | Facility: CLINIC | Age: 78
End: 2023-06-28
Payer: MEDICARE

## 2023-06-28 VITALS
SYSTOLIC BLOOD PRESSURE: 124 MMHG | WEIGHT: 245 LBS | DIASTOLIC BLOOD PRESSURE: 77 MMHG | HEART RATE: 86 BPM | HEIGHT: 70 IN | TEMPERATURE: 99 F | OXYGEN SATURATION: 94 % | BODY MASS INDEX: 35.07 KG/M2 | RESPIRATION RATE: 14 BRPM

## 2023-06-28 PROBLEM — S24.2XXA: Status: ACTIVE | Noted: 2022-07-11

## 2023-06-28 LAB
PM 6 MAM: NEGATIVE NG/ML
PM 7-AMINO-CLONAZ: NEGATIVE NG/ML
PM ALPHA-HYDROXY-ALPRAZOLAM: NEGATIVE NG/ML
PM ALPHA-HYDROXY-MIDAZOLAM: NEGATIVE NG/ML
PM ALPRAZOLAM: NEGATIVE NG/ML
PM AMOBARBITAL: NEGATIVE NG/ML
PM AMPHETAMINE INTERP: NEGATIVE
PM AMPHETAMINE: NEGATIVE NG/ML
PM BARBURATES INTERP: NEGATIVE
PM BEG: NEGATIVE NG/ML
PM BENZODIAZEPINES INTERP: NEGATIVE
PM BUPRENORPHINE INTERP: NEGATIVE
PM BUPRENORPHINE: NEGATIVE NG/ML
PM BUTALBITAL: NEGATIVE NG/ML
PM CLONAZEPAM: NEGATIVE NG/ML
PM COCAINE INTERP: NEGATIVE
PM COCAINE: NEGATIVE NG/ML
PM CODIENE: NEGATIVE NG/ML
PM COTININE: 957 NG/ML
PM DIAZEPAM: NEGATIVE NG/ML
PM DIHYROCODEINE: NEGATIVE NG/ML
PM EDDP: NEGATIVE NG/ML
PM FENTANYL INTERP: NEGATIVE NG/ML
PM FENTANYL: NEGATIVE NG/ML
PM FLUNITRAZEPAM: NEGATIVE NG/ML
PM FLURAZEPAM: NEGATIVE NG/ML
PM HYDROCODONE: NEGATIVE NG/ML
PM HYDROMORPHONE: NEGATIVE NG/ML
PM LORAZEPAM: NEGATIVE NG/ML
PM MARIJUANA (DELTA-9-THC): NEGATIVE NG/ML
PM MARIJUANA INTERP: NEGATIVE
PM MDA: NEGATIVE NG/ML
PM MDEA: NEGATIVE NG/ML
PM MDMA: NEGATIVE NG/ML
PM MEPERIDINE: NEGATIVE NG/ML
PM METHADONE INTERP: NEGATIVE
PM METHADONE: NEGATIVE NG/ML
PM METHAMPHETAMINE: NEGATIVE NG/ML
PM MIDAZOLAM: NEGATIVE NG/ML
PM MORPHINE: NEGATIVE NG/ML
PM NALOXONE: NEGATIVE NG/ML
PM NALTREXONE: NEGATIVE NG/ML
PM NICOTINE INTERP: POSITIVE
PM NORBUPRENORPHINE: NEGATIVE NG/ML
PM NORDIAZEPAM: NEGATIVE NG/ML
PM NORMEPERIDINE: NEGATIVE NG/ML
PM NOROXYCODONE: 694 NG/ML
PM OPIOID INTERP: NEGATIVE
PM OXAZEPAM: NEGATIVE NG/ML
PM OXXYCODONE INTERP: POSITIVE
PM OXYCODONE: >1000 NG/ML
PM OXYMORPHONE: NEGATIVE NG/ML
PM PCP: NEGATIVE NG/ML
PM PHENCYCLIDINE INTERP: NEGATIVE
PM PHENOBARBITAL: NEGATIVE NG/ML
PM PPX: NEGATIVE NG/ML
PM PROPOXYPHENE INTERP: NEGATIVE
PM SECOBARBITAL: NEGATIVE NG/ML
PM SUFENTANIL: NEGATIVE NG/ML
PM TAPENTADOL: NEGATIVE NG/ML
PM TEMAZEPAM: NEGATIVE NG/ML
PM TRAMADOL INTERP: NEGATIVE
PM TRAMADOL: NEGATIVE NG/ML

## 2023-06-28 PROCEDURE — 99213 OFFICE O/P EST LOW 20 MIN: CPT | Mod: 25

## 2023-06-28 PROCEDURE — S0189: CPT

## 2023-06-28 PROCEDURE — 11980 IMPLANT HORMONE PELLET(S): CPT

## 2023-06-28 PROCEDURE — 99214 OFFICE O/P EST MOD 30 MIN: CPT | Mod: 25

## 2023-06-28 NOTE — ASSESSMENT
[FreeTextEntry1] : His numbers were low 3 months ago he is due for the next dose and he will get 6 pellets into the right buttock.

## 2023-06-28 NOTE — LETTER BODY
[FreeTextEntry2] : Aamir Woods MD\par [address] [place] [address]\[place] [address] \par

## 2023-06-28 NOTE — HISTORY OF PRESENT ILLNESS
[FreeTextEntry1] : Jose Roberto is a 77-year-old male born July 2, 1945 last seen on December 6, 2022.  Physicians assistant spoke to him on March 28, 2023, telling him that the testosterone is low and that he is due for the next dose but that there was a significant drop in his hemoglobin and he should speak to his PCP possibly go to the emergency room.  Jose Roberto thought that meant he should not get the Testopel comes in now as he is really dragging and wants to know if he can get his next dose.  He tells me they found that the hemoglobin was low because of the recent surgery I have him on iron he is having lower endoscopy and is scheduled for upper endoscopy but his hemoglobin is back up.\par \par Has not had sex though he has an implant because he has no energy and no libido but he is hoping once the pellets go back and he will be back in action.

## 2023-06-28 NOTE — LETTER HEADER
[FreeTextEntry3] : Mohan Hernández M.D.\par Director Emeritus of Urology\par [facility]\par [address] [place] [address], [address]\[place] [address]

## 2023-07-24 ENCOUNTER — FORM ENCOUNTER (OUTPATIENT)
Age: 78
End: 2023-07-24

## 2023-07-24 ENCOUNTER — APPOINTMENT (OUTPATIENT)
Dept: PAIN MANAGEMENT | Facility: CLINIC | Age: 78
End: 2023-07-24
Payer: OTHER MISCELLANEOUS

## 2023-07-24 VITALS
HEART RATE: 89 BPM | SYSTOLIC BLOOD PRESSURE: 132 MMHG | DIASTOLIC BLOOD PRESSURE: 89 MMHG | HEIGHT: 70 IN | WEIGHT: 245 LBS | BODY MASS INDEX: 35.07 KG/M2

## 2023-07-24 PROCEDURE — 99213 OFFICE O/P EST LOW 20 MIN: CPT | Mod: ACP

## 2023-07-24 NOTE — ASSESSMENT
[FreeTextEntry1] : Mr. Irizarry is a 78-year-old gentleman who sustained a work related accident in 1995 affecting his neck and lower back. Patient's symptomology and physical exam findings were reviewed and point to possible SI joint pathology. We will submit for a right SI joint injection for further relief. We will continue to prescribe Oxycodone 15 mg BID, cyclobenzaprine and hydroxyzine unchanged. Patient will f/u in 4 weeks for further evaluation.\par \par Patient had pain on PSIS area, Osiel’s positive and pelvic rocking positive. Patient has trialed rehab (Home exercise, physical therapy or chiropractic care) and medications. Schedule a right diagnostic and therapeutic sacroiliac joint injection. If greater than 50% relief for greater than 30 minutes, would do a second right sacroiliac joint injection in 1-2 weeks. With greater than 50% relief for 6-8 weeks, a right sacroiliac joint injection could be repeated in 3 months vs RFA or referral to neurosurgeon.\par \par The patient has severe anxiety of procedures that necessitates monitored anesthesia care (MAC). The procedure performed will be close to major nerves, arteries, and spinal cord and/or joint structures. Due to the proximity of these structures, we need the patient to be still during the procedure.  With the help of MAC, this will be safely achieved and decrease the risk of any complications.\par \par Overall there is at least a 30-50% reduction in pain with the prescribed analgesics. The patient denies any adverse side effects due to the medication (sleeping disturbance, constipation, sleepiness, hallucinations and/or urination problems).\par \par I have consulted the  registry for the purpose of reviewing the patient's controlled substance.\par \par Deon Downing PA-C\par Kathie Phillips MD\par \par

## 2023-07-24 NOTE — PHYSICAL EXAM
[Normal Coordination] : normal coordination [Normal DTR UE/LE] : normal DTR UE/LE  [Normal Sensation] : normal sensation [Normal Mood and Affect] : normal mood and affect [Orientated] : orientated [Able to Communicate] : able to communicate [Well Developed] : well developed [Well Nourished] : well nourished [de-identified] : Examination of the neck is as follows: \par Inspection: no scars, no erythema, no ecchymosis, no palpable masses. \par Palpation (Bilateral): paracervical spasm, paracervical tenderness, but no trapezial spasm, no trapezial tenderness, no rhomboid spasm, no rhomboid tenderness. \par ROM: diminished ROM in all planes. Pain at extremes of: flexion, extension, rotation to left, rotation to right. Pain radiates to left arm with motion. Numbness radiates to left arm with motion. Pain radiates to right arm with motion. Numbness radiates to right arm with motion. \par Strength: motor exam is non-focal throughout both upper extremities. \par \par Examination of the spine is as follows: \par Inspection: incisions clean and dry, skin intact, no atrophy, no erythema, no ecchymosis, no palpable masses, no swelling, no sign of infection, no induration. \par Palpation (Bilateral): lumbar paraspinal spasm, lumbar paraspinal tenderness, sciatic nerve tenderness, numbness/tingling of leg, numbness/tingling of foot, but no thoracic paraspinal spasm. \par ROM: diminished ROM in all planes. \par Strength: motor exam is non-focal throughout both lower extremities. \par Testing (Bilateral): positive sitting straight leg raise. \par \par Gait: mildly antalgic,   Does not use an assistive device. [] : positive Boby test reproduction of pain into groin

## 2023-07-24 NOTE — HISTORY OF PRESENT ILLNESS
[FreeTextEntry1] : ORIGINAL PRESENTATION: Mr. Irizarry is a 77-year-old male who continues to follow up with us for chronic cervical and lumbar pain radiating into the upper extremities related to a 1995 work injury. He has undergone cervical fusion without significant improvement. Medication management has continued as well as intermittent interventional procedures in the form of trigger point injections as well as cervical epidural procedures. He remains content with his response to medication management in the form of oxycodone IR which is consumed up to three times daily.\par \par He states his pain is of undetermined origin, moderate to severe, constant, in no typical pattern. He states it is pressure-like, dull aching, and throbbing. He denies any weakness in the upper lower extremities. He states that lying down standing sitting walking and exercise increases his pain. Denies any recent change in bowel or bladder habits.\par \par ACTIVITIES: He states he can walk 3 blocks before his pain begins. He can sit and stand for 30 minutes before his pain begins. In a day he sometimes has lie down because of pain. He currently uses no assistive walking device.\par \par PRIOR PAIN TREATMENTS: Surgery by Dr. Henderson in 1996 gave him moderate relief.\par \par PRIOR PAIN MEDICATIONS: Tylenol, cyclobenzaprine, oxycodone, Percocet.\par \par PATIENT PRESENTS FOR FOLLOW UP: He is under our care for chronic cervical and lumbar pain radiating into the upper and lower extremities related to a 1995 work injury. He previously underwent an L1-2 laminectomy and L3-5 decompression w/ conflex with Dr. Henderson on 12/22/22. His cervical pains have been stable with medication management. His chief complaint continues to stem from his lumbar spine. He notes severe pain in the right buttock with radiation into the right hamstring region. The pain is constant and shooting in nature. He is having difficulty with ambulation. He cannot sit for long periods of time. We discussed trialing a right SI joint injection to target this pain and he is agreeable. He is medically managed in our office on Oxycodone 15 mg BID. He also utilizes cyclobenzaprine and hydroxyzine. Patient denies any adverse reactions or side effects. \par \par UDS- 6/23/23- consistent

## 2023-07-31 NOTE — ASU PATIENT PROFILE, ADULT - PMH
147.9
AICD (automatic cardioverter/defibrillator) present    GERD (gastroesophageal reflux disease)    High cholesterol    HTN (hypertension)    Myocardial infarct  10 years ago

## 2023-07-31 NOTE — PRE-ANESTHESIA EVALUATION ADULT - NSANTHALCOHOLSD_GEN_ALL_CORE
No

## 2023-08-03 ENCOUNTER — APPOINTMENT (OUTPATIENT)
Dept: PAIN MANAGEMENT | Facility: CLINIC | Age: 78
End: 2023-08-03
Payer: OTHER MISCELLANEOUS

## 2023-08-03 PROCEDURE — 93770 DETERMINATION VENOUS PRESS: CPT

## 2023-08-03 PROCEDURE — 27096 INJECT SACROILIAC JOINT: CPT | Mod: RT

## 2023-08-03 PROCEDURE — 94761 N-INVAS EAR/PLS OXIMETRY MLT: CPT

## 2023-08-03 PROCEDURE — 93040 RHYTHM ECG WITH REPORT: CPT | Mod: 79

## 2023-08-03 NOTE — PROCEDURE
[FreeTextEntry1] : SACROILIAC JOINT INJECTION UNDER FLUOROSCOPY [FreeTextEntry3] : SACROILIAC JOINT INJECTION UNDER FLUOROSCOPY  Preoperative Diagnosis: Right  SIJ Arthropathy  Procedure: Right SIJ Injection under Fluoroscopy  Physician: Kathie Phillips MD, FAAPMR  Anesthesia: See nurses note/Lidocaine/ Cold spray.     Medical Necessity:  Failure of conservative management.     Indications:  The patient was admitted for a median branch injection for diagnostic purposes.  The patient was explained the technique, complications and rationale for the procedure and elected to proceed.     Indication for Fluoroscopy:  This procedure requires the precise placement of the spinal needle.  It is the only way to accurately and safely perform the injection.     CONSENT: The possible complications including infection, bleeding, nerve damage, Hospital admission, death or failure of the procedure; though unusual, are theoretically possible. The patient was educated about the of the procedure and alternative therapies. All questions were answered and the patient freely gave consent to proceed.     Monitoring:  Patient had continuous blood pressure, EKG, and pulse oximetry throughout the case. See nurse's notes     PROCEDURE NOTE:  After obtaining written consent, the patient was placed on the fluoroscopic table in the prone position  A time out was performed.  Fluoroscopic guidance was used to isolate and identify the Right sacroiliac joint.  A medial to lateral oblique projection allowed separation of the anterior (lateral) and posterior (medial) joint space.  The sacrum and posterior iliac crest was prepped with chloraprep and draped in usual sterile fashion. Cold spray was used to localize the area. A 22-gauge 3.5 inch spinal needle was directed into the inferior aspect of the sacroiliac joint using a posterior approach in bull's eye fashion. The interosseous ligament was engaged which provoked responses consisting of her typical painful symptoms. A 2 cc total volume of lidocaine 2% ( 1  cc) and depomedrol 40mg (1/2 cc) was injected. Volumes were kept small-commensurate with the joint's capacity as determined by end-injection back pressure on the syringe. The needle was removed.     Sacro-iliac Joint Radiography:  Omnipaque 240mg/cc - 1/2 cc was injected in the inferior pole of the SIJ and the entire sacroiliac joint was outlined under fluoroscopy.   Complications: None. The patient tolerated the procedure well.     Disposition: I have examined the patient and there are no new physical findings since the original presentation.  Sensory and motor function were intact. The patient met discharge criteria see nurses notes. The discharge instruction sheet was reviewed and given to the patient. The patient was discharged home with a . If patient gets sustained relief will have patient do modified planks 3 times a day on carpet or yoga mat starting at 5 seconds building up to 1 minute without grimacing/Valsalva and walking.  If patient gets sustained relief will have patient do modified planks 3 times a day on carpet or yoga mat starting at 5 seconds building up to 1 minute without grimacing/Valsalva and walking.     Comment: If 70% relief greater than 2 hours or 50% greater than 24 hours would proceed to RFA. If 50% less than 24 hours would repeat to confirm for RFA. Follow in office. Call if any problems.        This document was electronically signed by:    Kathie Phillips MD, FAAPMR Diplomate, American Board of Physical Medicine and Rehabilitation Diplomate, American Board of Pain Medicine

## 2023-08-23 ENCOUNTER — APPOINTMENT (OUTPATIENT)
Dept: CARDIOTHORACIC SURGERY | Facility: CLINIC | Age: 78
End: 2023-08-23
Payer: MEDICARE

## 2023-08-23 VITALS
HEART RATE: 99 BPM | OXYGEN SATURATION: 90 % | HEIGHT: 70 IN | BODY MASS INDEX: 35.07 KG/M2 | RESPIRATION RATE: 12 BRPM | WEIGHT: 245 LBS | TEMPERATURE: 98 F

## 2023-08-23 VITALS — SYSTOLIC BLOOD PRESSURE: 129 MMHG | DIASTOLIC BLOOD PRESSURE: 83 MMHG

## 2023-08-23 DIAGNOSIS — Z95.810 PRESENCE OF AUTOMATIC (IMPLANTABLE) CARDIAC DEFIBRILLATOR: ICD-10-CM

## 2023-08-23 PROCEDURE — 99204 OFFICE O/P NEW MOD 45 MIN: CPT

## 2023-08-23 NOTE — PHYSICAL EXAM
[General Appearance - Alert] : alert [General Appearance - In No Acute Distress] : in no acute distress [General Appearance - Well Nourished] : well nourished [Sclera] : the sclera and conjunctiva were normal [Outer Ear] : the ears and nose were normal in appearance [Hearing Threshold Finger Rub Not Grimes] : hearing was normal [Both Tympanic Membranes Were Examined] : both tympanic membranes were normal [Neck Appearance] : the appearance of the neck was normal [Neck Cervical Mass (___cm)] : no neck mass was observed [] : no respiratory distress [Respiration, Rhythm And Depth] : normal respiratory rhythm and effort [Exaggerated Use Of Accessory Muscles For Inspiration] : no accessory muscle use [Apical Impulse] : the apical impulse was normal [Heart Rate And Rhythm] : heart rate was normal and rhythm regular [Heart Sounds] : normal S1 and S2 [Bowel Sounds] : normal bowel sounds [Abdomen Soft] : soft [Abdomen Tenderness] : non-tender [Abnormal Walk] : normal gait [Skin Color & Pigmentation] : normal skin color and pigmentation [Cranial Nerves] : cranial nerves 2-12 were intact [Oriented To Time, Place, And Person] : oriented to person, place, and time [Impaired Insight] : insight and judgment were intact

## 2023-08-24 NOTE — ASSESSMENT
[FreeTextEntry1] : Mr. CHRISTINA MUSTAFA is a 78 year M, former smoker, that arrives today for a consultation for a lead fracture.  PMH CAD S/P PCI only on ASA Stent X3, HTN, HLD, Spinal Pain.  Here today for surgical discussion.    Plan: Reviewed testing from Dr. Chew office Patient needs possible laser lead revision, alarming Dr Chew turned off device, no longer alarming- [employer] Will plan for Lead Revision possible laser lead on 9/5 Preop- PAST  I, Jennifer CHANG-BC, am acting as scribe for  I personally performed the services described in the documentation, reviewed the documentation recorded by the scribe in my presence and it accurately and completely records my words and actions.  Pt has two ICD leads since 2014, now the second one is fractured. Dr. Howe referred pt for lead replacement, but access may require laser extraction.   Also removal of abandine lead may allow for an MRI compatibe system  Guidelines also support extraction when multiple electrodes are present at themtime of lead replacement  I explained the procedure in detail, including risks, benefits and alternatives, and the patient agreed to proceed with surgery by signing informed consent forms. Surgery will be scheduled and will proceed as soon as presurgical testing is completed.  Pt will need ICD lead replcaement with laserlead extractions, possible sternotomy or thoracotomy.  -FMR

## 2023-08-24 NOTE — HISTORY OF PRESENT ILLNESS
[FreeTextEntry1] : Mr. CHRISTINA MUSTAFA is a 78 year M, former smoker, that arrives today for a consultation for a lead fracture.  PMH CAD S/P PCI only on ASA Stent X3, HTN, HLD, Spinal Pain. Patient was seen by their EP for complaints of AICD Alarming for the past 3 days, and has been alarming q 4 hours, was shut off yesterday by Dr. Chew.  He denies syncope, AICD shock or dizziness, in normal state of health besides his sciatic pains.   Here today for surgical discussion.    CCS I Lives with wife no aide Former Smoker  Poor Historian Denies major psychiatric history  Their Healthcare team is as follows:  PMD: Dr. Houston Cardiologist: Dr. Chew

## 2023-08-25 ENCOUNTER — APPOINTMENT (OUTPATIENT)
Dept: PAIN MANAGEMENT | Facility: CLINIC | Age: 78
End: 2023-08-25
Payer: OTHER MISCELLANEOUS

## 2023-08-25 VITALS
DIASTOLIC BLOOD PRESSURE: 81 MMHG | HEART RATE: 103 BPM | HEIGHT: 70 IN | WEIGHT: 245 LBS | SYSTOLIC BLOOD PRESSURE: 127 MMHG | BODY MASS INDEX: 35.07 KG/M2

## 2023-08-25 PROCEDURE — 99213 OFFICE O/P EST LOW 20 MIN: CPT | Mod: ACP

## 2023-08-25 NOTE — ASSESSMENT
[FreeTextEntry1] : Mr. Irizarry is a 78-year-old gentleman who sustained a work-related accident in 1995 affecting his neck and lower back. He is s/p a right SI joint injection on 8/03/23 with no relief. I will have him see our neurosurgery department for further evaluation. We will continue to prescribe Oxycodone 15 mg BID, cyclobenzaprine and hydroxyzine unchanged. Patient will f/u in 4 weeks for further evaluation.  Overall there is at least a 30-50% reduction in pain with the prescribed analgesics. The patient denies any adverse side effects due to the medication (sleeping disturbance, constipation, sleepiness, hallucinations and/or urination problems).  I have consulted the  registry for the purpose of reviewing the patient's controlled substance.  LUZ Mehta MD

## 2023-08-25 NOTE — HISTORY OF PRESENT ILLNESS
[FreeTextEntry1] : ORIGINAL PRESENTATION: Mr. Irizarry is a 77-year-old male who continues to follow up with us for chronic cervical and lumbar pain radiating into the upper extremities related to a 1995 work injury. He has undergone cervical fusion without significant improvement. Medication management has continued as well as intermittent interventional procedures in the form of trigger point injections as well as cervical epidural procedures. He remains content with his response to medication management in the form of oxycodone IR which is consumed up to three times daily.  He states his pain is of undetermined origin, moderate to severe, constant, in no typical pattern. He states it is pressure-like, dull aching, and throbbing. He denies any weakness in the upper lower extremities. He states that lying down standing sitting walking and exercise increases his pain. Denies any recent change in bowel or bladder habits.  ACTIVITIES: He states he can walk 3 blocks before his pain begins. He can sit and stand for 30 minutes before his pain begins. In a day he sometimes has lie down because of pain. He currently uses no assistive walking device.  PRIOR PAIN TREATMENTS: Surgery by Dr. Henderson in 1996 gave him moderate relief.  PRIOR PAIN MEDICATIONS: Tylenol, cyclobenzaprine, oxycodone, Percocet.  PATIENT PRESENTS FOR FOLLOW UP: He is under our care for chronic cervical and lumbar pain radiating into the upper and lower extremities related to a 1995 work injury. He previously underwent an L1-2 laminectomy and L3-5 decompression w/ conflex with Dr. Henderson on 12/22/22. His cervical pains have been stable with medication management. He is s/p a right SI joint injection on 8/03/23 with no relief. He states that the pain has gotten worse and now he is struggling to put pressure on the leg as it exacerbates the pain in his right buttock region. He continues with severe pain in the right buttock that radiates into the right hamstring region. He appears very aggravated in office. He is medically managed in our office on Oxycodone 15 mg BID. He also utilizes cyclobenzaprine and hydroxyzine. Patient denies any adverse reactions or side effects.   UDS- 6/23/23- consistent

## 2023-08-25 NOTE — PHYSICAL EXAM
[Normal Coordination] : normal coordination [Normal DTR UE/LE] : normal DTR UE/LE  [Normal Sensation] : normal sensation [Normal Mood and Affect] : normal mood and affect [Orientated] : orientated [Able to Communicate] : able to communicate [Well Developed] : well developed [Well Nourished] : well nourished [de-identified] : Examination of the neck is as follows: \par  Inspection: no scars, no erythema, no ecchymosis, no palpable masses. \par  Palpation (Bilateral): paracervical spasm, paracervical tenderness, but no trapezial spasm, no trapezial tenderness, no rhomboid spasm, no rhomboid tenderness. \par  ROM: diminished ROM in all planes. Pain at extremes of: flexion, extension, rotation to left, rotation to right. Pain radiates to left arm with motion. Numbness radiates to left arm with motion. Pain radiates to right arm with motion. Numbness radiates to right arm with motion. \par  Strength: motor exam is non-focal throughout both upper extremities. \par  \par  Examination of the spine is as follows: \par  Inspection: incisions clean and dry, skin intact, no atrophy, no erythema, no ecchymosis, no palpable masses, no swelling, no sign of infection, no induration. \par  Palpation (Bilateral): lumbar paraspinal spasm, lumbar paraspinal tenderness, sciatic nerve tenderness, numbness/tingling of leg, numbness/tingling of foot, but no thoracic paraspinal spasm. \par  ROM: diminished ROM in all planes. \par  Strength: motor exam is non-focal throughout both lower extremities. \par  Testing (Bilateral): positive sitting straight leg raise. \par  \par  Gait: mildly antalgic,   Does not use an assistive device. [] : positive Boby test reproduction of pain into groin

## 2023-08-28 ENCOUNTER — RESULT REVIEW (OUTPATIENT)
Age: 78
End: 2023-08-28

## 2023-08-28 ENCOUNTER — OUTPATIENT (OUTPATIENT)
Dept: OUTPATIENT SERVICES | Facility: HOSPITAL | Age: 78
LOS: 1 days | End: 2023-08-28
Payer: COMMERCIAL

## 2023-08-28 ENCOUNTER — APPOINTMENT (OUTPATIENT)
Dept: NEUROSURGERY | Facility: CLINIC | Age: 78
End: 2023-08-28
Payer: MEDICARE

## 2023-08-28 VITALS — BODY MASS INDEX: 35.07 KG/M2 | WEIGHT: 245 LBS | HEIGHT: 70 IN

## 2023-08-28 DIAGNOSIS — Z90.89 ACQUIRED ABSENCE OF OTHER ORGANS: Chronic | ICD-10-CM

## 2023-08-28 DIAGNOSIS — M25.551 PAIN IN RIGHT HIP: ICD-10-CM

## 2023-08-28 DIAGNOSIS — Z95.810 PRESENCE OF AUTOMATIC (IMPLANTABLE) CARDIAC DEFIBRILLATOR: Chronic | ICD-10-CM

## 2023-08-28 DIAGNOSIS — I25.10 ATHEROSCLEROTIC HEART DISEASE OF NATIVE CORONARY ARTERY WITHOUT ANGINA PECTORIS: Chronic | ICD-10-CM

## 2023-08-28 PROCEDURE — 73521 X-RAY EXAM HIPS BI 2 VIEWS: CPT | Mod: 26

## 2023-08-28 PROCEDURE — 73521 X-RAY EXAM HIPS BI 2 VIEWS: CPT

## 2023-08-28 PROCEDURE — 99214 OFFICE O/P EST MOD 30 MIN: CPT

## 2023-08-28 NOTE — HISTORY OF PRESENT ILLNESS
[FreeTextEntry1] : CHIEF COMPLAINT: Mr. Irizarry presents for a revisit encounter. He notes an increase in right hip pain with associated radiating pain into the right knee. Symptoms present for 2 months time, no causative event identified such as an accident or trauma. He notes limitation with ambulation due to pain and is tearful during our encounter at this time. He remains under the care of Pain Management and has undergone a right SI joint block in early August 2023 which failed to provide relief. He is s/p L1-2, L3-5 decompressive laminectomy with coflex which was completed in December 2022 by Dr. Henderson. 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  CONSERVATIVE MANAGEMENT TRIALED: Physical therapy, Pain Management injections (most recently right SI joint block by Dr. Phillips with no benefit noted)  DIAGNOSTIC TESTING: No updated imaging/testing available for my review.  PHYSICAL EXAM:  Constitutional: Well appearing, no distress HEENT: Normocephalic Atraumatic Psychiatric: Alert and oriented to all spheres, normal mood Pulmonary: No respiratory distress  Neurologic:  CN II-XII grossly intact Palpation: no tenderness to palpation of midline lumbar or paravertebral regions. (+) marked tenderness to palpation over right lateral hip. (-) groin tenderness to palpation Strength: Full strength in all major muscle groups Sensation: Full sensation to light touch in all extremities Reflexes:   2+ patellar  2+ ankle jerk  ROM of lumbar spine fully intact. Right hip- with passive internal/external rotation severe pain noted.  Signs: SLR negative  Gait: antalgic gait

## 2023-08-28 NOTE — ASSESSMENT
[FreeTextEntry1] : This is a 79 yo M who presents for neurosurgical revisit with regards to right hip pain. Patient with physical exam concerning for bursitits to the right hip. X-ray bilateral hips/pelvis ordered for my review and pending result we may suggest that he undergo Pain Management injections geared towards the right hip (bursa injection v. intraarticular steroid injection).  Patient will contact me once X-ray completed and we will discuss the result and future treatment goals.  Maritza Colin PA-C

## 2023-08-29 DIAGNOSIS — M25.551 PAIN IN RIGHT HIP: ICD-10-CM

## 2023-08-30 ENCOUNTER — OUTPATIENT (OUTPATIENT)
Dept: OUTPATIENT SERVICES | Facility: HOSPITAL | Age: 78
LOS: 1 days | End: 2023-08-30
Payer: MEDICARE

## 2023-08-30 ENCOUNTER — RESULT REVIEW (OUTPATIENT)
Age: 78
End: 2023-08-30

## 2023-08-30 VITALS
SYSTOLIC BLOOD PRESSURE: 148 MMHG | TEMPERATURE: 98 F | RESPIRATION RATE: 20 BRPM | DIASTOLIC BLOOD PRESSURE: 88 MMHG | WEIGHT: 250 LBS | HEART RATE: 78 BPM | HEIGHT: 70 IN | OXYGEN SATURATION: 99 %

## 2023-08-30 DIAGNOSIS — Z90.89 ACQUIRED ABSENCE OF OTHER ORGANS: Chronic | ICD-10-CM

## 2023-08-30 DIAGNOSIS — I25.10 ATHEROSCLEROTIC HEART DISEASE OF NATIVE CORONARY ARTERY WITHOUT ANGINA PECTORIS: Chronic | ICD-10-CM

## 2023-08-30 DIAGNOSIS — T82.118A BREAKDOWN (MECHANICAL) OF OTHER CARDIAC ELECTRONIC DEVICE, INITIAL ENCOUNTER: ICD-10-CM

## 2023-08-30 DIAGNOSIS — Z01.818 ENCOUNTER FOR OTHER PREPROCEDURAL EXAMINATION: ICD-10-CM

## 2023-08-30 DIAGNOSIS — Z95.810 PRESENCE OF AUTOMATIC (IMPLANTABLE) CARDIAC DEFIBRILLATOR: Chronic | ICD-10-CM

## 2023-08-30 LAB
ALBUMIN SERPL ELPH-MCNC: 4.3 G/DL — SIGNIFICANT CHANGE UP (ref 3.5–5.2)
ALP SERPL-CCNC: 119 U/L — HIGH (ref 30–115)
ALT FLD-CCNC: 28 U/L — SIGNIFICANT CHANGE UP (ref 0–41)
ANION GAP SERPL CALC-SCNC: 16 MMOL/L — HIGH (ref 7–14)
APPEARANCE UR: ABNORMAL
APTT BLD: 28.8 SEC — SIGNIFICANT CHANGE UP (ref 27–39.2)
AST SERPL-CCNC: 26 U/L — SIGNIFICANT CHANGE UP (ref 0–41)
BACTERIA # UR AUTO: NEGATIVE /HPF — SIGNIFICANT CHANGE UP
BASOPHILS # BLD AUTO: 0.09 K/UL — SIGNIFICANT CHANGE UP (ref 0–0.2)
BASOPHILS NFR BLD AUTO: 1.2 % — HIGH (ref 0–1)
BILIRUB SERPL-MCNC: 0.8 MG/DL — SIGNIFICANT CHANGE UP (ref 0.2–1.2)
BILIRUB UR-MCNC: NEGATIVE — SIGNIFICANT CHANGE UP
BLD GP AB SCN SERPL QL: SIGNIFICANT CHANGE UP
BUN SERPL-MCNC: 12 MG/DL — SIGNIFICANT CHANGE UP (ref 10–20)
CALCIUM SERPL-MCNC: 9.2 MG/DL — SIGNIFICANT CHANGE UP (ref 8.4–10.5)
CAST: 1 /LPF — SIGNIFICANT CHANGE UP (ref 0–4)
CHLORIDE SERPL-SCNC: 101 MMOL/L — SIGNIFICANT CHANGE UP (ref 98–110)
CO2 SERPL-SCNC: 21 MMOL/L — SIGNIFICANT CHANGE UP (ref 17–32)
COLOR SPEC: SIGNIFICANT CHANGE UP
CREAT SERPL-MCNC: 0.8 MG/DL — SIGNIFICANT CHANGE UP (ref 0.7–1.5)
DIFF PNL FLD: NEGATIVE — SIGNIFICANT CHANGE UP
EGFR: 91 ML/MIN/1.73M2 — SIGNIFICANT CHANGE UP
EOSINOPHIL # BLD AUTO: 0.29 K/UL — SIGNIFICANT CHANGE UP (ref 0–0.7)
EOSINOPHIL NFR BLD AUTO: 3.8 % — SIGNIFICANT CHANGE UP (ref 0–8)
GLUCOSE SERPL-MCNC: 88 MG/DL — SIGNIFICANT CHANGE UP (ref 70–99)
GLUCOSE UR QL: NEGATIVE MG/DL — SIGNIFICANT CHANGE UP
HCT VFR BLD CALC: 47.9 % — SIGNIFICANT CHANGE UP (ref 42–52)
HGB BLD-MCNC: 15.8 G/DL — SIGNIFICANT CHANGE UP (ref 14–18)
IMM GRANULOCYTES NFR BLD AUTO: 0.6 % — HIGH (ref 0.1–0.3)
INR BLD: 1.08 RATIO — SIGNIFICANT CHANGE UP (ref 0.65–1.3)
KETONES UR-MCNC: NEGATIVE MG/DL — SIGNIFICANT CHANGE UP
LEUKOCYTE ESTERASE UR-ACNC: ABNORMAL
LYMPHOCYTES # BLD AUTO: 1.09 K/UL — LOW (ref 1.2–3.4)
LYMPHOCYTES # BLD AUTO: 14.1 % — LOW (ref 20.5–51.1)
MCHC RBC-ENTMCNC: 30.9 PG — SIGNIFICANT CHANGE UP (ref 27–31)
MCHC RBC-ENTMCNC: 33 G/DL — SIGNIFICANT CHANGE UP (ref 32–37)
MCV RBC AUTO: 93.6 FL — SIGNIFICANT CHANGE UP (ref 80–94)
MONOCYTES # BLD AUTO: 0.77 K/UL — HIGH (ref 0.1–0.6)
MONOCYTES NFR BLD AUTO: 10 % — HIGH (ref 1.7–9.3)
MRSA PCR RESULT.: NEGATIVE — SIGNIFICANT CHANGE UP
NEUTROPHILS # BLD AUTO: 5.42 K/UL — SIGNIFICANT CHANGE UP (ref 1.4–6.5)
NEUTROPHILS NFR BLD AUTO: 70.3 % — SIGNIFICANT CHANGE UP (ref 42.2–75.2)
NITRITE UR-MCNC: NEGATIVE — SIGNIFICANT CHANGE UP
NRBC # BLD: 0 /100 WBCS — SIGNIFICANT CHANGE UP (ref 0–0)
PH UR: 5.5 — SIGNIFICANT CHANGE UP (ref 5–8)
PLATELET # BLD AUTO: 193 K/UL — SIGNIFICANT CHANGE UP (ref 130–400)
PMV BLD: 11.7 FL — HIGH (ref 7.4–10.4)
POTASSIUM SERPL-MCNC: 4.3 MMOL/L — SIGNIFICANT CHANGE UP (ref 3.5–5)
POTASSIUM SERPL-SCNC: 4.3 MMOL/L — SIGNIFICANT CHANGE UP (ref 3.5–5)
PROT SERPL-MCNC: 6.6 G/DL — SIGNIFICANT CHANGE UP (ref 6–8)
PROT UR-MCNC: SIGNIFICANT CHANGE UP MG/DL
PROTHROM AB SERPL-ACNC: 12.4 SEC — SIGNIFICANT CHANGE UP (ref 9.95–12.87)
RBC # BLD: 5.12 M/UL — SIGNIFICANT CHANGE UP (ref 4.7–6.1)
RBC # FLD: 14.1 % — SIGNIFICANT CHANGE UP (ref 11.5–14.5)
RBC CASTS # UR COMP ASSIST: 6 /HPF — HIGH (ref 0–4)
SODIUM SERPL-SCNC: 138 MMOL/L — SIGNIFICANT CHANGE UP (ref 135–146)
SP GR SPEC: 1.03 — SIGNIFICANT CHANGE UP (ref 1–1.03)
SQUAMOUS # UR AUTO: 2 /HPF — SIGNIFICANT CHANGE UP (ref 0–5)
UROBILINOGEN FLD QL: 1 MG/DL — SIGNIFICANT CHANGE UP (ref 0.2–1)
WBC # BLD: 7.71 K/UL — SIGNIFICANT CHANGE UP (ref 4.8–10.8)
WBC # FLD AUTO: 7.71 K/UL — SIGNIFICANT CHANGE UP (ref 4.8–10.8)
WBC UR QL: 4 /HPF — SIGNIFICANT CHANGE UP (ref 0–5)

## 2023-08-30 PROCEDURE — 87641 MR-STAPH DNA AMP PROBE: CPT

## 2023-08-30 PROCEDURE — 99214 OFFICE O/P EST MOD 30 MIN: CPT | Mod: 25

## 2023-08-30 PROCEDURE — 93005 ELECTROCARDIOGRAM TRACING: CPT

## 2023-08-30 PROCEDURE — 85730 THROMBOPLASTIN TIME PARTIAL: CPT

## 2023-08-30 PROCEDURE — 86901 BLOOD TYPING SEROLOGIC RH(D): CPT

## 2023-08-30 PROCEDURE — 87640 STAPH A DNA AMP PROBE: CPT

## 2023-08-30 PROCEDURE — 81001 URINALYSIS AUTO W/SCOPE: CPT

## 2023-08-30 PROCEDURE — 36415 COLL VENOUS BLD VENIPUNCTURE: CPT

## 2023-08-30 PROCEDURE — 86850 RBC ANTIBODY SCREEN: CPT

## 2023-08-30 PROCEDURE — 85025 COMPLETE CBC W/AUTO DIFF WBC: CPT

## 2023-08-30 PROCEDURE — 71046 X-RAY EXAM CHEST 2 VIEWS: CPT | Mod: 26

## 2023-08-30 PROCEDURE — 71046 X-RAY EXAM CHEST 2 VIEWS: CPT

## 2023-08-30 PROCEDURE — 85610 PROTHROMBIN TIME: CPT

## 2023-08-30 PROCEDURE — 93010 ELECTROCARDIOGRAM REPORT: CPT

## 2023-08-30 PROCEDURE — 80053 COMPREHEN METABOLIC PANEL: CPT

## 2023-08-30 PROCEDURE — 86900 BLOOD TYPING SEROLOGIC ABO: CPT

## 2023-08-30 NOTE — H&P PST ADULT - HISTORY OF PRESENT ILLNESS
79 yo m here for past. pt sched for replace of defib lead w/ laser lead extract, poss sternotomy or thoracotomy on 9/5/2023 w/ dr. conley    Pt reports no cardiopulmonary issues denies sob/bailon/cp/palpitations. Pt states no recent infections no fever no cough no uti uri. Stated exercise tolerance is  1   flights no changes. Kaveh screen revd.  pt verbalized and understanding of instructions  given and all concerns and questions asked and answered.  Pt denies any s/s covid 19 and reports no contact with known positive people. Pt has appointment for repeat covid testing pre op and instructed to continue to self monitor and report any concerns to MD. Pt will continue to practice self isolation and  exposure control measures pre op  Anesthesia Alert  NO--Difficult Airway  NO--History of neck surgery or radiation  NO--Limited ROM of neck  NO--History of Malignant hyperthermia  NO--No personal or family history of Pseudocholinesterase deficiency.  NO--Prior Anesthesia Complication  NO--Latex Allergy  NO--Loose teeth  NO--History of Rheumatoid Arthritis  NO--KAVEH  NO--Other_____  written and verbal instructions with teach back on chlorhexidine shampoo provided,  pt verbalized understanding with returned demonstration  PT DENIES ANY RASHES, ABRASION, OR OPEN WOUNDS OR CUTS  denies travel outside the [place] in the past 30 days    Duke Activity Status Index (DASI) from [employer]  on 8/30/2023  ** All calculations should be rechecked by clinician prior to use **    RESULT SUMMARY:  37.7 points  The higher the score (maximum 58.2), the higher the functional status.    7.37 METs        INPUTS:  Take care of self —> 2.75 = Yes  Walk indoors —> 1.75 = Yes  Walk 1&ndash;2 blocks on level ground —> 2.75 = Yes  Climb a flight of stairs or walk up a hill —> 5.5 = Yes  Run a short distance —> 0 = No  Do light work around the house —> 2.7 = Yes  Do moderate work around the house —> 3.5 = Yes  Do heavy work around the house —> 0 = No  Do yardwork —> 0 = No  Have sexual relations —> 5.25 = Yes  Participate in moderate recreational activities —> 6 = Yes  Participate in strenuous sports —> 7.5 = Yes    Revised Cardiac Risk Index for Pre-Operative Risk from [email or web address]  on 8/30/2023  ** All calculations should be rechecked by clinician prior to use **    RESULT SUMMARY:  0 points  Class I Risk    3.9 %  30-day risk of death, MI, or cardiac arrest    From Ducmichelepe 2017, based on pooled data from 5 high quality external validations (4 prospective). These numbers are higher than those often quoted from the now-outdated original study (Madhav 1999). See Evidence for details.      INPUTS:  Elevated-risk surgery —> 0 = No  History of ischemic heart disease —> 0 = No  History of congestive heart failure —> 0 = No  History of cerebrovascular disease —> 0 = No  Pre-operative treatment with insulin —> 0 = No  Pre-operative creatinine >2 mg/dL / 176.8 µmol/L —> 0 = No

## 2023-08-31 DIAGNOSIS — Z01.818 ENCOUNTER FOR OTHER PREPROCEDURAL EXAMINATION: ICD-10-CM

## 2023-08-31 DIAGNOSIS — T82.118A BREAKDOWN (MECHANICAL) OF OTHER CARDIAC ELECTRONIC DEVICE, INITIAL ENCOUNTER: ICD-10-CM

## 2023-09-05 ENCOUNTER — APPOINTMENT (OUTPATIENT)
Dept: PAIN MANAGEMENT | Facility: CLINIC | Age: 78
End: 2023-09-05
Payer: OTHER MISCELLANEOUS

## 2023-09-05 PROCEDURE — 99214 OFFICE O/P EST MOD 30 MIN: CPT

## 2023-09-05 NOTE — ASSESSMENT
[FreeTextEntry1] : Mr. Irizarry is a 78-year-old gentleman who sustained a work-related accident in 1995 affecting his neck and lower back. He is s/p a right SI joint injection on 8/03/23 with no relief. He followed up with the neurosurgery department who performed x-rays of the bilateral hips. After physical examination, there is tenderness over the right hip which is likely related to greater trochanteric bursitis. We will move forward with a RT greater trochanteric bursa injection under fluoro. In the interim, we will continue to prescribe Oxycodone 15 mg BID, cyclobenzaprine and hydroxyzine unchanged. Patient will f/u in 4 weeks for further evaluation. All this patient's questions were answered, and the conversation was understood well.  RISK AND BENEFIT PARAGRAPH: Risk, benefits, pros and cons of procedure were explained to the patient using models and diagrams and their questions were answered.  I, Betzaida Worrell, attest that this documentation has been prepared under the direction and in the presence of Provider Kathie Phillips MD.   Thank you for allowing me to assist in the management of this patient.    Best Regards,    Kathie Phillips M.D., FAAPMR   Diplomate, American Board of Physical Medicine and Rehabilitation Diplomate, American Board of Pain Medicine  Diplomate, American Board of Pain Management

## 2023-09-05 NOTE — PHYSICAL EXAM
[Normal Coordination] : normal coordination [Normal DTR UE/LE] : normal DTR UE/LE  [Normal Sensation] : normal sensation [Normal Mood and Affect] : normal mood and affect [Orientated] : orientated [Able to Communicate] : able to communicate [Well Developed] : well developed [Well Nourished] : well nourished [de-identified] : Examination of the neck is as follows: \par  Inspection: no scars, no erythema, no ecchymosis, no palpable masses. \par  Palpation (Bilateral): paracervical spasm, paracervical tenderness, but no trapezial spasm, no trapezial tenderness, no rhomboid spasm, no rhomboid tenderness. \par  ROM: diminished ROM in all planes. Pain at extremes of: flexion, extension, rotation to left, rotation to right. Pain radiates to left arm with motion. Numbness radiates to left arm with motion. Pain radiates to right arm with motion. Numbness radiates to right arm with motion. \par  Strength: motor exam is non-focal throughout both upper extremities. \par  \par  Examination of the spine is as follows: \par  Inspection: incisions clean and dry, skin intact, no atrophy, no erythema, no ecchymosis, no palpable masses, no swelling, no sign of infection, no induration. \par  Palpation (Bilateral): lumbar paraspinal spasm, lumbar paraspinal tenderness, sciatic nerve tenderness, numbness/tingling of leg, numbness/tingling of foot, but no thoracic paraspinal spasm. \par  ROM: diminished ROM in all planes. \par  Strength: motor exam is non-focal throughout both lower extremities. \par  Testing (Bilateral): positive sitting straight leg raise. \par  \par  Gait: mildly antalgic,   Does not use an assistive device. [Right] : right hip [] : patient ambulates without assistive device

## 2023-09-05 NOTE — HISTORY OF PRESENT ILLNESS
[FreeTextEntry1] : ORIGINAL PRESENTATION: Mr. Irizarry is a 77-year-old male who continues to follow up with us for chronic cervical and lumbar pain radiating into the upper extremities related to a 1995 work injury. He has undergone cervical fusion without significant improvement. Medication management has continued as well as intermittent interventional procedures in the form of trigger point injections as well as cervical epidural procedures. He remains content with his response to medication management in the form of oxycodone IR which is consumed up to three times daily.  He states his pain is of undetermined origin, moderate to severe, constant, in no typical pattern. He states it is pressure-like, dull aching, and throbbing. He denies any weakness in the upper lower extremities. He states that lying down standing sitting walking and exercise increases his pain. Denies any recent change in bowel or bladder habits.  ACTIVITIES: He states he can walk 3 blocks before his pain begins. He can sit and stand for 30 minutes before his pain begins. In a day he sometimes has lie down because of pain. He currently uses no assistive walking device.  PRIOR PAIN TREATMENTS: Surgery by Dr. Henderson in 1996 gave him moderate relief.  PRIOR PAIN MEDICATIONS: Tylenol, cyclobenzaprine, oxycodone, Percocet.  PATIENT PRESENTS FOR FOLLOW UP: He is under our care for chronic cervical and lumbar pain radiating into the upper and lower extremities related to a 1995 work injury. He previously underwent an L1-2, L3-5 decompressive laminectomy w/ coflex with Dr. Henderson on 12/22/22. His cervical pains have been stable with medication management. He is s/p a right SI joint injection on 8/03/23 with no relief. He states that the pain has gotten worse and now he is struggling to put pressure on the right leg as it exacerbates the pain in his right buttock region. He continues with severe pain in the right buttock that radiates into the right hamstring region. There is a soreness in the right thigh and pain over the right hip. The pain is severe and makes it difficult for him to perform his ADLs or sleep. Since his previous visit, he followed up with neurosurgery who suggested a right hip bursa vs intraarticular injection. X-rays of the bilateral hips were reviewed, and we discussed moving forward with a RT greater trochanteric bursa injection and he is agreeable to move forward.   He is medically managed in our office on Oxycodone 15 mg BID. He also utilizes cyclobenzaprine and hydroxyzine. Patient denies any adverse reactions or side effects.

## 2023-09-05 NOTE — DATA REVIEWED
[FreeTextEntry1] : X-ray of the bilateral hips dated 8/28/23 finds No acute fracture is seen. There are mild-moderate degenerative changes of the hips bilaterally.  12/2020: CT L spine: moderate-severe lumbar stenosis, L4-5. Severe spinal stenosis along with foraminal narrowing as well at that site.  SOAPP: moderate on 4/19/23 Patient has a combination of moderate rise SOAP and no risk factors. UDS would be repeated randomly every quarter.  UDS- 6/23/23- consistent.  [place] REGISTRY: Checked

## 2023-09-06 VITALS
SYSTOLIC BLOOD PRESSURE: 131 MMHG | HEIGHT: 70 IN | HEART RATE: 97 BPM | WEIGHT: 250 LBS | DIASTOLIC BLOOD PRESSURE: 96 MMHG | TEMPERATURE: 97 F | RESPIRATION RATE: 20 BRPM

## 2023-09-06 NOTE — PATIENT PROFILE ADULT - NSPROIMPLANTSMEDDEV_GEN_A_NUR
Automatic Implantable Cardioverter Defibrillator/Vascular stents/Clips Automatic Implantable Cardioverter Defibrillator/Penile implant/Vascular stents/Clips

## 2023-09-07 ENCOUNTER — INPATIENT (INPATIENT)
Facility: HOSPITAL | Age: 78
LOS: 0 days | Discharge: HOME | DRG: 227 | End: 2023-09-08
Attending: THORACIC SURGERY (CARDIOTHORACIC VASCULAR SURGERY) | Admitting: THORACIC SURGERY (CARDIOTHORACIC VASCULAR SURGERY)
Payer: MEDICARE

## 2023-09-07 ENCOUNTER — APPOINTMENT (OUTPATIENT)
Dept: CARDIOTHORACIC SURGERY | Facility: HOSPITAL | Age: 78
End: 2023-09-07

## 2023-09-07 ENCOUNTER — TRANSCRIPTION ENCOUNTER (OUTPATIENT)
Age: 78
End: 2023-09-07

## 2023-09-07 VITALS
HEART RATE: 78 BPM | DIASTOLIC BLOOD PRESSURE: 88 MMHG | SYSTOLIC BLOOD PRESSURE: 148 MMHG | RESPIRATION RATE: 20 BRPM | HEIGHT: 69.69 IN | WEIGHT: 249.12 LBS | TEMPERATURE: 98 F | OXYGEN SATURATION: 99 %

## 2023-09-07 DIAGNOSIS — Z90.89 ACQUIRED ABSENCE OF OTHER ORGANS: Chronic | ICD-10-CM

## 2023-09-07 DIAGNOSIS — T82.118A BREAKDOWN (MECHANICAL) OF OTHER CARDIAC ELECTRONIC DEVICE, INITIAL ENCOUNTER: ICD-10-CM

## 2023-09-07 DIAGNOSIS — I25.10 ATHEROSCLEROTIC HEART DISEASE OF NATIVE CORONARY ARTERY WITHOUT ANGINA PECTORIS: Chronic | ICD-10-CM

## 2023-09-07 LAB
ALBUMIN SERPL ELPH-MCNC: 4 G/DL — SIGNIFICANT CHANGE UP (ref 3.5–5.2)
ALP SERPL-CCNC: 113 U/L — SIGNIFICANT CHANGE UP (ref 30–115)
ALT FLD-CCNC: 22 U/L — SIGNIFICANT CHANGE UP (ref 0–41)
ANION GAP SERPL CALC-SCNC: 12 MMOL/L — SIGNIFICANT CHANGE UP (ref 7–14)
APTT BLD: 30.7 SEC — SIGNIFICANT CHANGE UP (ref 27–39.2)
AST SERPL-CCNC: 30 U/L — SIGNIFICANT CHANGE UP (ref 0–41)
BILIRUB SERPL-MCNC: 1 MG/DL — SIGNIFICANT CHANGE UP (ref 0.2–1.2)
BUN SERPL-MCNC: 12 MG/DL — SIGNIFICANT CHANGE UP (ref 10–20)
CALCIUM SERPL-MCNC: 9 MG/DL — SIGNIFICANT CHANGE UP (ref 8.4–10.4)
CHLORIDE SERPL-SCNC: 103 MMOL/L — SIGNIFICANT CHANGE UP (ref 98–110)
CO2 SERPL-SCNC: 22 MMOL/L — SIGNIFICANT CHANGE UP (ref 17–32)
CREAT SERPL-MCNC: 0.8 MG/DL — SIGNIFICANT CHANGE UP (ref 0.7–1.5)
EGFR: 91 ML/MIN/1.73M2 — SIGNIFICANT CHANGE UP
GLUCOSE BLDC GLUCOMTR-MCNC: 130 MG/DL — HIGH (ref 70–99)
GLUCOSE BLDC GLUCOMTR-MCNC: 147 MG/DL — HIGH (ref 70–99)
GLUCOSE SERPL-MCNC: 136 MG/DL — HIGH (ref 70–99)
HCT VFR BLD CALC: 43.7 % — SIGNIFICANT CHANGE UP (ref 42–52)
HGB BLD-MCNC: 14.8 G/DL — SIGNIFICANT CHANGE UP (ref 14–18)
INR BLD: 1.31 RATIO — HIGH (ref 0.65–1.3)
MAGNESIUM SERPL-MCNC: 2.1 MG/DL — SIGNIFICANT CHANGE UP (ref 1.8–2.4)
MCHC RBC-ENTMCNC: 30.6 PG — SIGNIFICANT CHANGE UP (ref 27–31)
MCHC RBC-ENTMCNC: 33.9 G/DL — SIGNIFICANT CHANGE UP (ref 32–37)
MCV RBC AUTO: 90.3 FL — SIGNIFICANT CHANGE UP (ref 80–94)
NRBC # BLD: 0 /100 WBCS — SIGNIFICANT CHANGE UP (ref 0–0)
PLATELET # BLD AUTO: 183 K/UL — SIGNIFICANT CHANGE UP (ref 130–400)
PMV BLD: 11.8 FL — HIGH (ref 7.4–10.4)
POTASSIUM SERPL-MCNC: 4.2 MMOL/L — SIGNIFICANT CHANGE UP (ref 3.5–5)
POTASSIUM SERPL-SCNC: 4.2 MMOL/L — SIGNIFICANT CHANGE UP (ref 3.5–5)
PROT SERPL-MCNC: 6.2 G/DL — SIGNIFICANT CHANGE UP (ref 6–8)
PROTHROM AB SERPL-ACNC: 15.1 SEC — HIGH (ref 9.95–12.87)
RBC # BLD: 4.84 M/UL — SIGNIFICANT CHANGE UP (ref 4.7–6.1)
RBC # FLD: 14 % — SIGNIFICANT CHANGE UP (ref 11.5–14.5)
SODIUM SERPL-SCNC: 137 MMOL/L — SIGNIFICANT CHANGE UP (ref 135–146)
WBC # BLD: 8.95 K/UL — SIGNIFICANT CHANGE UP (ref 4.8–10.8)
WBC # FLD AUTO: 8.95 K/UL — SIGNIFICANT CHANGE UP (ref 4.8–10.8)

## 2023-09-07 PROCEDURE — C1889: CPT

## 2023-09-07 PROCEDURE — 85730 THROMBOPLASTIN TIME PARTIAL: CPT

## 2023-09-07 PROCEDURE — C9399: CPT

## 2023-09-07 PROCEDURE — 71045 X-RAY EXAM CHEST 1 VIEW: CPT

## 2023-09-07 PROCEDURE — C1721: CPT

## 2023-09-07 PROCEDURE — 33249 INSJ/RPLCMT DEFIB W/LEAD(S): CPT

## 2023-09-07 PROCEDURE — C1892: CPT

## 2023-09-07 PROCEDURE — 71045 X-RAY EXAM CHEST 1 VIEW: CPT | Mod: 26,77

## 2023-09-07 PROCEDURE — 85610 PROTHROMBIN TIME: CPT

## 2023-09-07 PROCEDURE — C1777: CPT

## 2023-09-07 PROCEDURE — 86891 AUTOLOGOUS BLOOD OP SALVAGE: CPT

## 2023-09-07 PROCEDURE — 85027 COMPLETE CBC AUTOMATED: CPT

## 2023-09-07 PROCEDURE — 83735 ASSAY OF MAGNESIUM: CPT

## 2023-09-07 PROCEDURE — 33244 REMOVE ELCTRD TRANSVENOUSLY: CPT

## 2023-09-07 PROCEDURE — C1751: CPT

## 2023-09-07 PROCEDURE — 85025 COMPLETE CBC W/AUTO DIFF WBC: CPT

## 2023-09-07 PROCEDURE — 93005 ELECTROCARDIOGRAM TRACING: CPT

## 2023-09-07 PROCEDURE — C1769: CPT

## 2023-09-07 PROCEDURE — C2629: CPT

## 2023-09-07 PROCEDURE — 82962 GLUCOSE BLOOD TEST: CPT

## 2023-09-07 PROCEDURE — 36415 COLL VENOUS BLD VENIPUNCTURE: CPT

## 2023-09-07 PROCEDURE — C2628: CPT

## 2023-09-07 PROCEDURE — 71045 X-RAY EXAM CHEST 1 VIEW: CPT | Mod: 26

## 2023-09-07 PROCEDURE — C1773: CPT

## 2023-09-07 PROCEDURE — C1894: CPT

## 2023-09-07 PROCEDURE — 80053 COMPREHEN METABOLIC PANEL: CPT

## 2023-09-07 PROCEDURE — 93283 PRGRMG EVAL IMPLANTABLE DFB: CPT

## 2023-09-07 RX ORDER — POLYETHYLENE GLYCOL 3350 17 G/17G
17 POWDER, FOR SOLUTION ORAL DAILY
Refills: 0 | Status: DISCONTINUED | OUTPATIENT
Start: 2023-09-08 | End: 2023-09-08

## 2023-09-07 RX ORDER — INSULIN HUMAN 100 [IU]/ML
5 INJECTION, SOLUTION SUBCUTANEOUS
Qty: 100 | Refills: 0 | Status: DISCONTINUED | OUTPATIENT
Start: 2023-09-07 | End: 2023-09-08

## 2023-09-07 RX ORDER — ACETAMINOPHEN 500 MG
650 TABLET ORAL EVERY 6 HOURS
Refills: 0 | Status: CANCELLED | OUTPATIENT
Start: 2023-09-10 | End: 2023-09-08

## 2023-09-07 RX ORDER — CEFAZOLIN SODIUM 1 G
2000 VIAL (EA) INJECTION EVERY 8 HOURS
Refills: 0 | Status: COMPLETED | OUTPATIENT
Start: 2023-09-07 | End: 2023-09-08

## 2023-09-07 RX ORDER — SODIUM CHLORIDE 9 MG/ML
1000 INJECTION INTRAMUSCULAR; INTRAVENOUS; SUBCUTANEOUS
Refills: 0 | Status: DISCONTINUED | OUTPATIENT
Start: 2023-09-07 | End: 2023-09-08

## 2023-09-07 RX ORDER — ASPIRIN/CALCIUM CARB/MAGNESIUM 324 MG
81 TABLET ORAL DAILY
Refills: 0 | Status: DISCONTINUED | OUTPATIENT
Start: 2023-09-07 | End: 2023-09-08

## 2023-09-07 RX ORDER — AMIODARONE HYDROCHLORIDE 400 MG/1
400 TABLET ORAL
Refills: 0 | Status: DISCONTINUED | OUTPATIENT
Start: 2023-09-07 | End: 2023-09-08

## 2023-09-07 RX ORDER — CYCLOBENZAPRINE HYDROCHLORIDE 10 MG/1
0 TABLET, FILM COATED ORAL
Qty: 0 | Refills: 0 | DISCHARGE

## 2023-09-07 RX ORDER — SENNA PLUS 8.6 MG/1
2 TABLET ORAL AT BEDTIME
Refills: 0 | Status: DISCONTINUED | OUTPATIENT
Start: 2023-09-08 | End: 2023-09-08

## 2023-09-07 RX ORDER — ATORVASTATIN CALCIUM 80 MG/1
1 TABLET, FILM COATED ORAL
Qty: 0 | Refills: 0 | DISCHARGE

## 2023-09-07 RX ORDER — DEXTROSE 50 % IN WATER 50 %
50 SYRINGE (ML) INTRAVENOUS
Refills: 0 | Status: DISCONTINUED | OUTPATIENT
Start: 2023-09-07 | End: 2023-09-08

## 2023-09-07 RX ORDER — DEXTROSE 50 % IN WATER 50 %
25 SYRINGE (ML) INTRAVENOUS
Refills: 0 | Status: DISCONTINUED | OUTPATIENT
Start: 2023-09-07 | End: 2023-09-08

## 2023-09-07 RX ORDER — OXYCODONE HYDROCHLORIDE 5 MG/1
1 TABLET ORAL
Qty: 0 | Refills: 0 | DISCHARGE

## 2023-09-07 RX ORDER — ASPIRIN/CALCIUM CARB/MAGNESIUM 324 MG
81 TABLET ORAL
Qty: 0 | Refills: 0 | DISCHARGE

## 2023-09-07 RX ORDER — CHLORHEXIDINE GLUCONATE 213 G/1000ML
5 SOLUTION TOPICAL
Refills: 0 | Status: DISCONTINUED | OUTPATIENT
Start: 2023-09-07 | End: 2023-09-08

## 2023-09-07 RX ORDER — NICARDIPINE HYDROCHLORIDE 30 MG/1
5 CAPSULE, EXTENDED RELEASE ORAL
Qty: 40 | Refills: 0 | Status: DISCONTINUED | OUTPATIENT
Start: 2023-09-07 | End: 2023-09-08

## 2023-09-07 RX ORDER — ACETAMINOPHEN 500 MG
650 TABLET ORAL EVERY 6 HOURS
Refills: 0 | Status: DISCONTINUED | OUTPATIENT
Start: 2023-09-07 | End: 2023-09-08

## 2023-09-07 RX ORDER — ATORVASTATIN CALCIUM 80 MG/1
80 TABLET, FILM COATED ORAL AT BEDTIME
Refills: 0 | Status: DISCONTINUED | OUTPATIENT
Start: 2023-09-07 | End: 2023-09-08

## 2023-09-07 RX ORDER — HYDROXYZINE HCL 10 MG
0 TABLET ORAL
Qty: 0 | Refills: 0 | DISCHARGE

## 2023-09-07 RX ORDER — PANTOPRAZOLE SODIUM 20 MG/1
40 TABLET, DELAYED RELEASE ORAL ONCE
Refills: 0 | Status: DISCONTINUED | OUTPATIENT
Start: 2023-09-07 | End: 2023-09-08

## 2023-09-07 RX ORDER — FENTANYL CITRATE 50 UG/ML
25 INJECTION INTRAVENOUS
Refills: 0 | Status: DISCONTINUED | OUTPATIENT
Start: 2023-09-07 | End: 2023-09-08

## 2023-09-07 RX ADMIN — FENTANYL CITRATE 25 MICROGRAM(S): 50 INJECTION INTRAVENOUS at 17:50

## 2023-09-07 RX ADMIN — FENTANYL CITRATE 25 MICROGRAM(S): 50 INJECTION INTRAVENOUS at 18:05

## 2023-09-07 RX ADMIN — Medication 100 MILLIGRAM(S): at 21:36

## 2023-09-07 RX ADMIN — Medication 650 MILLIGRAM(S): at 18:45

## 2023-09-07 RX ADMIN — Medication 100 MILLIGRAM(S): at 18:16

## 2023-09-07 RX ADMIN — ATORVASTATIN CALCIUM 80 MILLIGRAM(S): 80 TABLET, FILM COATED ORAL at 21:23

## 2023-09-07 RX ADMIN — AMIODARONE HYDROCHLORIDE 400 MILLIGRAM(S): 400 TABLET ORAL at 18:17

## 2023-09-07 RX ADMIN — FENTANYL CITRATE 25 MICROGRAM(S): 50 INJECTION INTRAVENOUS at 20:50

## 2023-09-07 RX ADMIN — FENTANYL CITRATE 25 MICROGRAM(S): 50 INJECTION INTRAVENOUS at 21:05

## 2023-09-07 RX ADMIN — Medication 650 MILLIGRAM(S): at 18:17

## 2023-09-07 NOTE — DISCHARGE NOTE PROVIDER - NSDCFUSCHEDAPPT_GEN_ALL_CORE_FT
[facility]  NEUROSURG [address]  Scheduled Appointment: 09/14/2023    [facility]  ONCPAINMGT [address]  Scheduled Appointment: 09/25/2023    Mohan Hernández  [facility]  UROLOGY [address]  Scheduled Appointment: 09/28/2023    Chung Henderson  [facility]  NEUROSURG [address]  Scheduled Appointment: 10/03/2023    Christopher Deutsch  [facility]  ONCORTHO [address]  Scheduled Appointment: 10/04/2023

## 2023-09-07 NOTE — DISCHARGE NOTE PROVIDER - NSDCMRMEDTOKEN_GEN_ALL_CORE_FT
acetaminophen 500 mg/15 mL oral liquid: 2 milliliter(s) orally 4 times a day MDD:8  aspirin 81 mg oral tablet: 81  orally once a day  cyclobenzaprine 10 mg oral tablet: orally once a day (at bedtime), As Needed  enalapril 10 mg oral tablet: 1 tab(s) orally once a day  hydrOXYzine hydrochloride 25 mg oral tablet:   Lipitor 80 mg oral tablet: 1 tab(s) orally once a day (at bedtime)  multivitamin:   oxyCODONE 15 mg oral tablet: 1 tab(s) orally every 6 hours -for moderate pain MDD:4    acetaminophen 325 mg oral tablet: 2 tab(s) orally every 6 hours  aspirin 81 mg oral tablet: 81  orally once a day  cyclobenzaprine 10 mg oral tablet: orally once a day (at bedtime), As Needed  enalapril 10 mg oral tablet: 1 tab(s) orally once a day  hydrOXYzine hydrochloride 25 mg oral tablet:   Lipitor 80 mg oral tablet: 1 tab(s) orally once a day (at bedtime)  multivitamin:   oxyCODONE 15 mg oral tablet: 1 tab(s) orally every 6 hours -for moderate pain MDD:4

## 2023-09-07 NOTE — BRIEF OPERATIVE NOTE - NSICDXBRIEFPREOP_GEN_ALL_CORE_FT
PRE-OP DIAGNOSIS:  Implanted defibrillator electrode lead fracture 07-Sep-2023 17:16:30  Conrad Larkin

## 2023-09-07 NOTE — DISCHARGE NOTE PROVIDER - HOSPITAL COURSE
Mr. CHRISTINA MUSTAFA is a 78 year M, former smoker presented to [facility] clinic for consultation for a lead fracture. PMH CAD S/P PCI only on ASA Stent X3, HTN, HLD, Spinal Pain. Patient was seen by their EP for complaints of AICD Alarming. On 09/07/23, he presented for elective lead extraction and revision. Post-operatively,     Mr. CHRISTINA MUSTAFA is a 78 year M, former smoker presented to [facility] clinic for consultation for a lead fracture. PMH CAD S/P PCI only on ASA Stent X3, HTN, HLD, Spinal Pain. Patient was seen by their EP for complaints of AICD Alarming. On 09/07/23, he presented for elective lead extraction and revision. Post-operatively the patient had an uncomplicated course and was discharged home in stable condition on POD#1.

## 2023-09-07 NOTE — BRIEF OPERATIVE NOTE - OPERATION/FINDINGS
SUCCESSFUL  REMOVAL OF FRACTURED ICD LEAD WITH LASER EXTRACTION; SUCCESSFUL IMPLANT OF NEW LEAD AND REPLACEMENT OF GENERATOR; SUBMUSCULAR POCKET

## 2023-09-07 NOTE — PRE-ANESTHESIA EVALUATION ADULT - NS MD HP INPLANTS MED DEV
medtronic/Automatic Implantable Cardioverter Defibrillator medtronic/Automatic Implantable Cardioverter Defibrillator/None

## 2023-09-07 NOTE — DISCHARGE NOTE PROVIDER - CARE PROVIDERS DIRECT ADDRESSES
[email or web address]

## 2023-09-07 NOTE — BRIEF OPERATIVE NOTE - NSICDXBRIEFPOSTOP_GEN_ALL_CORE_FT
POST-OP DIAGNOSIS:  Implanted defibrillator electrode lead fracture 07-Sep-2023 17:16:36  Conrad Larkin

## 2023-09-07 NOTE — DISCHARGE NOTE PROVIDER - NSDCCPTREATMENT_GEN_ALL_CORE_FT
PRINCIPAL PROCEDURE  Procedure: Laser-assisted extraction of cardiac lead  Findings and Treatment: Activities/Restrictions:  1.	Do not – drive, lift, pull or push anything over 10 pounds for 4 weeks.  2.	Shower every night and carefully wash wound / groin, pat dry.  Cover if wound is draining with dry sterile dressing. No baths or swimming for 2 weeks.   3.	Apply support stockings /ace wraps to legs as soon as you get out of bed in the morning, remove in evening.   4.	Do progressive walking exercises every day, gradually increasing to 10 to 20 minutes/day, five days a week and incentive spirometer 10 times every hour while awake.  5.	DO NOT DRIVE OR CONSUME ALCOHOL WHILE TAKING PAIN MEDICATION.  Contact your Physician promptly if:  1.	 Signs of wound / groin infection, such as increasing redness, swelling, pain or drainage from incision / groin puncture sites.  2.	Progressive shortness of breath or increasing difficulty with breathing when lying down.  3.	Excessive nausea, vomiting, diarrhea or coughing.  4.	Increase swelling of legs that does not resolve with leg elevation.  5.	Chest pain that spreads to arms, jaw or back or sudden development of numbness, weakness, difficulty speaking or facial droop – Call 911.  6.	Diabetics who are unable to keep finger stick glucose under 150 for three consecutive readings.  Instructions:  1.	 Keep a daily log for Temperature, pulse, blood pressure, and weight twice a day and Glucose if diabetic with each meal. Call office for Temp > 101, pulse greater than 110 or less than 55, BP first # greater than 160 or less than 100, 3 pound weight gain in 1 day or 5 pounds in 3 days.

## 2023-09-07 NOTE — DISCHARGE NOTE PROVIDER - PROVIDER TOKENS
PROVIDER:[TOKEN:[[id number]]],PROVIDER:[TOKEN:[[id number]]],PROVIDER:[TOKEN:[[id number]]]

## 2023-09-07 NOTE — DISCHARGE NOTE PROVIDER - NSDCCPCAREPLAN_GEN_ALL_CORE_FT
PRINCIPAL DISCHARGE DIAGNOSIS  Diagnosis: Implanted defibrillator electrode lead fracture, initial encounter  Assessment and Plan of Treatment:

## 2023-09-07 NOTE — DISCHARGE NOTE PROVIDER - CARE PROVIDER_API CALL
Conrad Larkin  Thoracic and Cardiac Surgery  [address] [place] [address], [address]  [place] [address]  Phone: [phone]  Fax: [phone]  Follow Up Time:     Ondina Howe  Cardiac Electrophysiology  [address] [place] [address]  Phone: [phone]  Fax: [phone]  Follow Up Time:     Aamir Woods  Internal Medicine  [address] [place] [address]  Phone: [phone]  Fax: [phone]  Follow Up Time:

## 2023-09-07 NOTE — BRIEF OPERATIVE NOTE - NSICDXBRIEFPROCEDURE_GEN_ALL_CORE_FT
PROCEDURES:  Replacement, ICD, dual chamber 07-Sep-2023 17:15:08  Conrad Larkin  AICD lead extraction 07-Sep-2023 17:15:24  Conrad Larkin  Removal, electrode lead, ICD, laser-assisted 07-Sep-2023 17:15:50  Conrad Larkin

## 2023-09-08 ENCOUNTER — TRANSCRIPTION ENCOUNTER (OUTPATIENT)
Age: 78
End: 2023-09-08

## 2023-09-08 VITALS
RESPIRATION RATE: 19 BRPM | HEART RATE: 81 BPM | OXYGEN SATURATION: 96 % | DIASTOLIC BLOOD PRESSURE: 58 MMHG | SYSTOLIC BLOOD PRESSURE: 100 MMHG

## 2023-09-08 LAB
ALBUMIN SERPL ELPH-MCNC: 3.5 G/DL — SIGNIFICANT CHANGE UP (ref 3.5–5.2)
ALP SERPL-CCNC: 100 U/L — SIGNIFICANT CHANGE UP (ref 30–115)
ALT FLD-CCNC: 20 U/L — SIGNIFICANT CHANGE UP (ref 0–41)
ANION GAP SERPL CALC-SCNC: 11 MMOL/L — SIGNIFICANT CHANGE UP (ref 7–14)
AST SERPL-CCNC: 26 U/L — SIGNIFICANT CHANGE UP (ref 0–41)
BASOPHILS # BLD AUTO: 0 K/UL — SIGNIFICANT CHANGE UP (ref 0–0.2)
BASOPHILS NFR BLD AUTO: 0 % — SIGNIFICANT CHANGE UP (ref 0–1)
BILIRUB SERPL-MCNC: 1 MG/DL — SIGNIFICANT CHANGE UP (ref 0.2–1.2)
BUN SERPL-MCNC: 12 MG/DL — SIGNIFICANT CHANGE UP (ref 10–20)
CALCIUM SERPL-MCNC: 8.3 MG/DL — LOW (ref 8.4–10.5)
CHLORIDE SERPL-SCNC: 100 MMOL/L — SIGNIFICANT CHANGE UP (ref 98–110)
CO2 SERPL-SCNC: 21 MMOL/L — SIGNIFICANT CHANGE UP (ref 17–32)
CREAT SERPL-MCNC: 0.8 MG/DL — SIGNIFICANT CHANGE UP (ref 0.7–1.5)
EGFR: 91 ML/MIN/1.73M2 — SIGNIFICANT CHANGE UP
EOSINOPHIL # BLD AUTO: 0 K/UL — SIGNIFICANT CHANGE UP (ref 0–0.7)
EOSINOPHIL NFR BLD AUTO: 0 % — SIGNIFICANT CHANGE UP (ref 0–8)
GIANT PLATELETS BLD QL SMEAR: PRESENT — SIGNIFICANT CHANGE UP
GLUCOSE SERPL-MCNC: 138 MG/DL — HIGH (ref 70–99)
HCT VFR BLD CALC: 39.6 % — LOW (ref 42–52)
HGB BLD-MCNC: 13.5 G/DL — LOW (ref 14–18)
LYMPHOCYTES # BLD AUTO: 0.38 K/UL — LOW (ref 1.2–3.4)
LYMPHOCYTES # BLD AUTO: 3.5 % — LOW (ref 20.5–51.1)
MANUAL SMEAR VERIFICATION: SIGNIFICANT CHANGE UP
MCHC RBC-ENTMCNC: 31 PG — SIGNIFICANT CHANGE UP (ref 27–31)
MCHC RBC-ENTMCNC: 34.1 G/DL — SIGNIFICANT CHANGE UP (ref 32–37)
MCV RBC AUTO: 90.8 FL — SIGNIFICANT CHANGE UP (ref 80–94)
MONOCYTES # BLD AUTO: 0.38 K/UL — SIGNIFICANT CHANGE UP (ref 0.1–0.6)
MONOCYTES NFR BLD AUTO: 3.5 % — SIGNIFICANT CHANGE UP (ref 1.7–9.3)
NEUTROPHILS # BLD AUTO: 9.99 K/UL — HIGH (ref 1.4–6.5)
NEUTROPHILS NFR BLD AUTO: 93 % — HIGH (ref 42.2–75.2)
PLAT MORPH BLD: NORMAL — SIGNIFICANT CHANGE UP
PLATELET # BLD AUTO: 164 K/UL — SIGNIFICANT CHANGE UP (ref 130–400)
PMV BLD: 11.9 FL — HIGH (ref 7.4–10.4)
POIKILOCYTOSIS BLD QL AUTO: SIGNIFICANT CHANGE UP
POLYCHROMASIA BLD QL SMEAR: SLIGHT — SIGNIFICANT CHANGE UP
POTASSIUM SERPL-MCNC: 4.3 MMOL/L — SIGNIFICANT CHANGE UP (ref 3.5–5)
POTASSIUM SERPL-SCNC: 4.3 MMOL/L — SIGNIFICANT CHANGE UP (ref 3.5–5)
PROT SERPL-MCNC: 5.6 G/DL — LOW (ref 6–8)
RBC # BLD: 4.36 M/UL — LOW (ref 4.7–6.1)
RBC # FLD: 13.8 % — SIGNIFICANT CHANGE UP (ref 11.5–14.5)
RBC BLD AUTO: NORMAL — SIGNIFICANT CHANGE UP
SODIUM SERPL-SCNC: 132 MMOL/L — LOW (ref 135–146)
WBC # BLD: 10.74 K/UL — SIGNIFICANT CHANGE UP (ref 4.8–10.8)
WBC # FLD AUTO: 10.74 K/UL — SIGNIFICANT CHANGE UP (ref 4.8–10.8)

## 2023-09-08 PROCEDURE — 93284 PRGRMG EVAL IMPLANTABLE DFB: CPT | Mod: 26

## 2023-09-08 PROCEDURE — 71045 X-RAY EXAM CHEST 1 VIEW: CPT | Mod: 26

## 2023-09-08 PROCEDURE — 93010 ELECTROCARDIOGRAM REPORT: CPT

## 2023-09-08 RX ORDER — ACETAMINOPHEN 500 MG
2 TABLET ORAL
Qty: 0 | Refills: 0 | DISCHARGE
Start: 2023-09-08

## 2023-09-08 RX ADMIN — Medication 650 MILLIGRAM(S): at 02:30

## 2023-09-08 RX ADMIN — Medication 650 MILLIGRAM(S): at 01:00

## 2023-09-08 RX ADMIN — Medication 100 MILLIGRAM(S): at 05:30

## 2023-09-08 RX ADMIN — Medication 650 MILLIGRAM(S): at 05:30

## 2023-09-08 RX ADMIN — Medication 650 MILLIGRAM(S): at 06:38

## 2023-09-08 RX ADMIN — AMIODARONE HYDROCHLORIDE 400 MILLIGRAM(S): 400 TABLET ORAL at 05:30

## 2023-09-08 RX ADMIN — Medication 10 MILLIGRAM(S): at 05:31

## 2023-09-08 NOTE — DISCHARGE NOTE NURSING/CASE MANAGEMENT/SOCIAL WORK - PATIENT PORTAL LINK FT
You can access the FollowMyHealth Patient Portal offered by [facility] by registering at the following website: [email or web address]. By joining [employer]’s FollowMyHealth portal, you will also be able to view your health information using other applications (apps) compatible with our system.

## 2023-09-11 DIAGNOSIS — Z79.82 LONG TERM (CURRENT) USE OF ASPIRIN: ICD-10-CM

## 2023-09-11 DIAGNOSIS — I87.1 COMPRESSION OF VEIN: ICD-10-CM

## 2023-09-11 DIAGNOSIS — Z87.891 PERSONAL HISTORY OF NICOTINE DEPENDENCE: ICD-10-CM
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
## 2023-09-11 DIAGNOSIS — T82.110A BREAKDOWN (MECHANICAL) OF CARDIAC ELECTRODE, INITIAL ENCOUNTER: ICD-10-CM
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
## 2023-09-11 DIAGNOSIS — Z95.5 PRESENCE OF CORONARY ANGIOPLASTY IMPLANT AND GRAFT: ICD-10-CM

## 2023-09-11 DIAGNOSIS — I42.9 CARDIOMYOPATHY, UNSPECIFIED: ICD-10-CM

## 2023-09-11 DIAGNOSIS — I25.10 ATHEROSCLEROTIC HEART DISEASE OF NATIVE CORONARY ARTERY WITHOUT ANGINA PECTORIS: ICD-10-CM

## 2023-09-14 ENCOUNTER — APPOINTMENT (OUTPATIENT)
Dept: NEUROSURGERY | Facility: CLINIC | Age: 78
End: 2023-09-14
Payer: MEDICARE

## 2023-09-14 PROCEDURE — XXXXX: CPT | Mod: 1L

## 2023-09-21 ENCOUNTER — NON-APPOINTMENT (OUTPATIENT)
Age: 78
End: 2023-09-21

## 2023-09-21 LAB
ALBUMIN SERPL ELPH-MCNC: 4.5 G/DL
ALP BLD-CCNC: 116 U/L
ALT SERPL-CCNC: 27 U/L
AST SERPL-CCNC: 27 U/L
BASOPHILS # BLD AUTO: 0.11 K/UL
BASOPHILS NFR BLD AUTO: 1.3 %
BILIRUB DIRECT SERPL-MCNC: <0.2 MG/DL
BILIRUB INDIRECT SERPL-MCNC: >0.4 MG/DL
BILIRUB SERPL-MCNC: 0.6 MG/DL
EOSINOPHIL # BLD AUTO: 0.33 K/UL
EOSINOPHIL NFR BLD AUTO: 3.9 %
ESTRADIOL SERPL-MCNC: 31 PG/ML
HCT VFR BLD CALC: 45.5 %
HGB BLD-MCNC: 14.8 G/DL
IMM GRANULOCYTES NFR BLD AUTO: 0.2 %
LYMPHOCYTES # BLD AUTO: 1.17 K/UL
LYMPHOCYTES NFR BLD AUTO: 14 %
MAN DIFF?: NORMAL
MCHC RBC-ENTMCNC: 30.9 PG
MCHC RBC-ENTMCNC: 32.5 G/DL
MCV RBC AUTO: 95 FL
MONOCYTES # BLD AUTO: 0.9 K/UL
MONOCYTES NFR BLD AUTO: 10.8 %
NEUTROPHILS # BLD AUTO: 5.84 K/UL
NEUTROPHILS NFR BLD AUTO: 69.8 %
PLATELET # BLD AUTO: 284 K/UL
PROT SERPL-MCNC: 6.7 G/DL
PSA FREE FLD-MCNC: 19 %
PSA FREE SERPL-MCNC: 0.12 NG/ML
PSA SERPL-MCNC: 0.62 NG/ML
RBC # BLD: 4.79 M/UL
RBC # FLD: 14.6 %
SHBG SERPL-SCNC: 71.4 NMOL/L
WBC # FLD AUTO: 8.37 K/UL

## 2023-09-25 ENCOUNTER — APPOINTMENT (OUTPATIENT)
Dept: PAIN MANAGEMENT | Facility: CLINIC | Age: 78
End: 2023-09-25

## 2023-09-26 ENCOUNTER — NON-APPOINTMENT (OUTPATIENT)
Age: 78
End: 2023-09-26

## 2023-09-26 LAB
TESTOSTERONE FREE/WEAKLY BND: 10.8 NG/DL
TESTOSTERONE TOTAL S: 252 NG/DL
TESTOSTERONE, % FREE/WEAKLY BND: 4.3 %

## 2023-09-27 ENCOUNTER — APPOINTMENT (OUTPATIENT)
Dept: UROLOGY | Facility: CLINIC | Age: 78
End: 2023-09-27
Payer: MEDICARE

## 2023-09-27 VITALS
BODY MASS INDEX: 31.64 KG/M2 | SYSTOLIC BLOOD PRESSURE: 115 MMHG | HEIGHT: 70 IN | HEART RATE: 103 BPM | WEIGHT: 221 LBS | DIASTOLIC BLOOD PRESSURE: 80 MMHG

## 2023-09-27 PROCEDURE — 11980 IMPLANT HORMONE PELLET(S): CPT

## 2023-09-27 PROCEDURE — 99214 OFFICE O/P EST MOD 30 MIN: CPT | Mod: 25

## 2023-09-27 RX ORDER — NYSTATIN 100000 [USP'U]/ML
100000 SUSPENSION ORAL
Qty: 200 | Refills: 0 | Status: COMPLETED | COMMUNITY
Start: 2023-08-11

## 2023-09-27 RX ORDER — BETAMETHASONE DIPROPIONATE 0.5 MG/G
0.05 CREAM TOPICAL
Qty: 30 | Refills: 0 | Status: COMPLETED | COMMUNITY
Start: 2023-05-28

## 2023-09-27 RX ORDER — MUPIROCIN 20 MG/G
2 OINTMENT TOPICAL
Qty: 22 | Refills: 0 | Status: COMPLETED | COMMUNITY
Start: 2023-06-22

## 2023-09-27 RX ORDER — SODIUM SULFATE, POTASSIUM SULFATE AND MAGNESIUM SULFATE 1.6; 3.13; 17.5 G/177ML; G/177ML; G/177ML
17.5-3.13-1.6 SOLUTION ORAL
Qty: 354 | Refills: 0 | Status: COMPLETED | COMMUNITY
Start: 2023-04-20

## 2023-09-27 RX ORDER — PREDNISONE 10 MG/1
10 TABLET ORAL
Qty: 30 | Refills: 0 | Status: COMPLETED | COMMUNITY
Start: 2023-04-28

## 2023-09-27 RX ORDER — OSELTAMIVIR PHOSPHATE 75 MG/1
75 CAPSULE ORAL
Qty: 10 | Refills: 0 | Status: COMPLETED | COMMUNITY
Start: 2023-08-11

## 2023-09-27 RX ORDER — PROMETHAZINE HYDROCHLORIDE AND DEXTROMETHORPHAN HYDROBROMIDE ORAL SOLUTION 15; 6.25 MG/5ML; MG/5ML
6.25-15 SOLUTION ORAL
Qty: 240 | Refills: 0 | Status: COMPLETED | COMMUNITY
Start: 2023-04-12

## 2023-09-27 RX ORDER — TRIAMCINOLONE ACETONIDE 0.25 MG/G
0.03 CREAM TOPICAL
Qty: 15 | Refills: 0 | Status: COMPLETED | COMMUNITY
Start: 2023-06-22

## 2023-09-27 RX ORDER — AMOXICILLIN AND CLAVULANATE POTASSIUM 875; 125 MG/1; MG/1
875-125 TABLET, COATED ORAL
Qty: 20 | Refills: 0 | Status: COMPLETED | COMMUNITY
Start: 2023-06-15

## 2023-10-03 ENCOUNTER — APPOINTMENT (OUTPATIENT)
Dept: ORTHOPEDIC SURGERY | Facility: CLINIC | Age: 78
End: 2023-10-03
Payer: MEDICARE

## 2023-10-03 DIAGNOSIS — M25.551 PAIN IN RIGHT HIP: ICD-10-CM

## 2023-10-03 PROCEDURE — 99203 OFFICE O/P NEW LOW 30 MIN: CPT

## 2023-10-03 PROCEDURE — 73521 X-RAY EXAM HIPS BI 2 VIEWS: CPT

## 2023-10-04 ENCOUNTER — APPOINTMENT (OUTPATIENT)
Dept: ORTHOPEDIC SURGERY | Facility: CLINIC | Age: 78
End: 2023-10-04
Payer: MEDICARE

## 2023-10-04 VITALS — BODY MASS INDEX: 32.21 KG/M2 | HEIGHT: 70 IN | WEIGHT: 225 LBS

## 2023-10-04 DIAGNOSIS — M17.10 UNILATERAL PRIMARY OSTEOARTHRITIS, UNSPECIFIED KNEE: ICD-10-CM

## 2023-10-04 PROCEDURE — 73560 X-RAY EXAM OF KNEE 1 OR 2: CPT | Mod: RT

## 2023-10-04 PROCEDURE — 99203 OFFICE O/P NEW LOW 30 MIN: CPT | Mod: 1L,25

## 2023-10-07 NOTE — ASU DISCHARGE PLAN (ADULT/PEDIATRIC). - A. DRIVE A CAR, OPERATE POWER TOOLS OR MACHINERY
Pt arrived to ED from home with cc of increasingly worse abd pain x 3 days. Pt reports vomiting up blood today. Hx of liver cirrhosis and diagnosed with liver cancer in May 2023. Pt reports she has vomited up blood in the past. AAOx4.  
Statement Selected

## 2023-10-16 ENCOUNTER — APPOINTMENT (OUTPATIENT)
Dept: NEUROSURGERY | Facility: CLINIC | Age: 78
End: 2023-10-16
Payer: MEDICARE

## 2023-10-16 VITALS — HEIGHT: 70 IN | BODY MASS INDEX: 32.21 KG/M2 | WEIGHT: 225 LBS

## 2023-10-16 DIAGNOSIS — M54.16 RADICULOPATHY, LUMBAR REGION: ICD-10-CM

## 2023-10-16 DIAGNOSIS — M48.061 SPINAL STENOSIS, LUMBAR REGION WITHOUT NEUROGENIC CLAUDICATION: ICD-10-CM

## 2023-10-16 PROCEDURE — 99214 OFFICE O/P EST MOD 30 MIN: CPT

## 2023-10-27 ENCOUNTER — LABORATORY RESULT (OUTPATIENT)
Age: 78
End: 2023-10-27

## 2023-10-27 ENCOUNTER — APPOINTMENT (OUTPATIENT)
Dept: PAIN MANAGEMENT | Facility: CLINIC | Age: 78
End: 2023-10-27
Payer: OTHER MISCELLANEOUS

## 2023-10-27 VITALS
BODY MASS INDEX: 32.21 KG/M2 | SYSTOLIC BLOOD PRESSURE: 142 MMHG | HEART RATE: 112 BPM | DIASTOLIC BLOOD PRESSURE: 86 MMHG | WEIGHT: 225 LBS | HEIGHT: 70 IN

## 2023-10-27 LAB
AMP / AMPHETAMINE: NEGATIVE
BAR / SECOBARBITAL: NEGATIVE
BUP / BUPRENORPHINE: NEGATIVE
BZO / OXAZEPAM: NEGATIVE
COC / COCAINE: NEGATIVE
MDMA / METHYLENEDIOXYMETHAMPHETAMINE: NEGATIVE
MET / METHAMPHETAMINES: NEGATIVE
MOP / MORPHINE: NEGATIVE
MTD / METHADONE: NEGATIVE
OXY / OXYCODONE: NEGATIVE
PCP / PHENCYCLIDINE: NEGATIVE
TEMPERATURE: 92 F
THC / MARIJUANA: NEGATIVE

## 2023-10-27 PROCEDURE — 99214 OFFICE O/P EST MOD 30 MIN: CPT | Mod: ACP

## 2023-10-27 PROCEDURE — 80305 DRUG TEST PRSMV DIR OPT OBS: CPT | Mod: QW

## 2023-10-31 LAB
PM 6 MAM: NEGATIVE NG/ML
PM 7-AMINO-CLONAZ: NEGATIVE NG/ML
PM ALPHA-HYDROXY-ALPRAZOLAM: NEGATIVE NG/ML
PM ALPHA-HYDROXY-MIDAZOLAM: NEGATIVE NG/ML
PM ALPRAZOLAM: NEGATIVE NG/ML
PM AMOBARBITAL: NEGATIVE NG/ML
PM AMPHETAMINE INTERP: NEGATIVE
PM AMPHETAMINE: NEGATIVE NG/ML
PM BARBURATES INTERP: NEGATIVE
PM BEG: NEGATIVE NG/ML
PM BENZODIAZEPINES INTERP: NEGATIVE
PM BUPRENORPHINE INTERP: NEGATIVE
PM BUPRENORPHINE: NEGATIVE NG/ML
PM BUTALBITAL: NEGATIVE NG/ML
PM CLONAZEPAM: NEGATIVE NG/ML
PM COCAINE INTERP: NEGATIVE
PM COCAINE: NEGATIVE NG/ML
PM CODIENE: NEGATIVE NG/ML
PM COTININE: >1000 NG/ML
PM DIAZEPAM: NEGATIVE NG/ML
PM DIHYROCODEINE: NEGATIVE NG/ML
PM EDDP: NEGATIVE NG/ML
PM FENTANYL INTERP: NEGATIVE
PM FENTANYL: NEGATIVE NG/ML
PM FLUNITRAZEPAM: NEGATIVE NG/ML
PM FLURAZEPAM: NEGATIVE NG/ML
PM HYDROCODONE: NEGATIVE NG/ML
PM HYDROMORPHONE: NEGATIVE NG/ML
PM LORAZEPAM: NEGATIVE NG/ML
PM MARIJUANA (DELTA-9-THC): NEGATIVE NG/ML
PM MARIJUANA INTERP: NEGATIVE
PM MDA: NEGATIVE NG/ML
PM MDEA: NEGATIVE NG/ML
PM MDMA: NEGATIVE NG/ML
PM MEPERIDINE: NEGATIVE NG/ML
PM METHADONE INTERP: NEGATIVE
PM METHADONE: NEGATIVE NG/ML
PM METHAMPHETAMINE: NEGATIVE NG/ML
PM MIDAZOLAM: NEGATIVE NG/ML
PM MORPHINE: NEGATIVE NG/ML
PM NALOXONE: NEGATIVE NG/ML
PM NALTREXONE: NEGATIVE NG/ML
PM NICOTINE INTERP: POSITIVE
PM NORBUPRENORPHINE: NEGATIVE NG/ML
PM NORDIAZEPAM: NEGATIVE NG/ML
PM NORFENTANYL: NEGATIVE NG/ML
PM NORMEPERIDINE: NEGATIVE NG/ML
PM NOROXYCODONE: NEGATIVE NG/ML
PM OPIOID INTERP: NEGATIVE
PM OXAZEPAM: NEGATIVE NG/ML
PM OXXYCODONE INTERP: NEGATIVE
PM OXYCODONE: NEGATIVE NG/ML
PM OXYMORPHONE: NEGATIVE NG/ML
PM PCP: NEGATIVE NG/ML
PM PHENCYCLIDINE INTERP: NEGATIVE
PM PHENOBARBITAL: NEGATIVE NG/ML
PM PPX: NEGATIVE NG/ML
PM PROPOXYPHENE INTERP: NEGATIVE
PM SECOBARBITAL: NEGATIVE NG/ML
PM SUFENTANIL: NEGATIVE NG/ML
PM TAPENTADOL: NEGATIVE NG/ML
PM TEMAZEPAM: NEGATIVE NG/ML
PM TRAMADOL INTERP: POSITIVE
PM TRAMADOL: >1000 NG/ML

## 2023-11-01 ENCOUNTER — APPOINTMENT (OUTPATIENT)
Dept: PAIN MANAGEMENT | Facility: CLINIC | Age: 78
End: 2023-11-01
Payer: OTHER MISCELLANEOUS

## 2023-11-01 PROCEDURE — 95912 NRV CNDJ TEST 11-12 STUDIES: CPT

## 2023-11-01 PROCEDURE — 95886 MUSC TEST DONE W/N TEST COMP: CPT

## 2023-11-24 ENCOUNTER — LABORATORY RESULT (OUTPATIENT)
Age: 78
End: 2023-11-24

## 2023-11-24 ENCOUNTER — APPOINTMENT (OUTPATIENT)
Dept: PAIN MANAGEMENT | Facility: CLINIC | Age: 78
End: 2023-11-24
Payer: OTHER MISCELLANEOUS

## 2023-11-24 DIAGNOSIS — M54.6 PAIN IN THORACIC SPINE: ICD-10-CM

## 2023-11-24 DIAGNOSIS — Z79.891 LONG TERM (CURRENT) USE OF OPIATE ANALGESIC: ICD-10-CM

## 2023-11-24 LAB
AMP / AMPHETAMINE: NEGATIVE
BAR / SECOBARBITAL: NEGATIVE
BUP / BUPRENORPHINE: NEGATIVE
BZO / OXAZEPAM: NEGATIVE
COC / COCAINE: NEGATIVE
CREATININE: NORMAL MG/DL
MDMA / METHYLENEDIOXYMETHAMPHETAMINE: NEGATIVE
MET / METHAMPHETAMINES: NEGATIVE
MOP / MORPHINE: NEGATIVE
MTD / METHADONE: NEGATIVE
OXY / OXYCODONE: NEGATIVE
PCP / PHENCYCLIDINE: NEGATIVE
PH: NORMAL
SPECIFIC GRAVITY: NORMAL
TEMPERATURE: 90 C
THC / MARIJUANA: NEGATIVE

## 2023-11-24 PROCEDURE — 80305 DRUG TEST PRSMV DIR OPT OBS: CPT | Mod: QW

## 2023-11-24 PROCEDURE — 99214 OFFICE O/P EST MOD 30 MIN: CPT | Mod: ACP

## 2023-11-29 LAB
PM 6 MAM: NEGATIVE NG/ML
PM 7-AMINO-CLONAZ: NEGATIVE NG/ML
PM ALPHA-HYDROXY-ALPRAZOLAM: NEGATIVE NG/ML
PM ALPHA-HYDROXY-MIDAZOLAM: NEGATIVE NG/ML
PM ALPRAZOLAM: NEGATIVE NG/ML
PM AMOBARBITAL: NEGATIVE NG/ML
PM AMPHETAMINE INTERP: NEGATIVE
PM AMPHETAMINE: NEGATIVE NG/ML
PM BARBURATES INTERP: NEGATIVE
PM BEG: NEGATIVE NG/ML
PM BENZODIAZEPINES INTERP: NEGATIVE
PM BUPRENORPHINE INTERP: NEGATIVE
PM BUPRENORPHINE: NEGATIVE NG/ML
PM BUTALBITAL: NEGATIVE NG/ML
PM CLONAZEPAM: NEGATIVE NG/ML
PM COCAINE INTERP: NEGATIVE
PM COCAINE: NEGATIVE NG/ML
PM CODIENE: NEGATIVE NG/ML
PM COTININE: 865 NG/ML
PM DIAZEPAM: NEGATIVE NG/ML
PM DIHYROCODEINE: NEGATIVE NG/ML
PM EDDP: NEGATIVE NG/ML
PM FENTANYL INTERP: NEGATIVE
PM FENTANYL: NEGATIVE NG/ML
PM FLUNITRAZEPAM: NEGATIVE NG/ML
PM FLURAZEPAM: NEGATIVE NG/ML
PM HYDROCODONE: NEGATIVE NG/ML
PM HYDROMORPHONE: NEGATIVE NG/ML
PM LORAZEPAM: NEGATIVE NG/ML
PM MARIJUANA (DELTA-9-THC): NEGATIVE NG/ML
PM MARIJUANA INTERP: NEGATIVE
PM MDA: NEGATIVE NG/ML
PM MDEA: NEGATIVE NG/ML
PM MDMA: NEGATIVE NG/ML
PM MEPERIDINE: NEGATIVE NG/ML
PM METHADONE INTERP: NEGATIVE
PM METHADONE: NEGATIVE NG/ML
PM METHAMPHETAMINE: NEGATIVE NG/ML
PM MIDAZOLAM: NEGATIVE NG/ML
PM MORPHINE: NEGATIVE NG/ML
PM NALOXONE: NEGATIVE NG/ML
PM NALTREXONE: NEGATIVE NG/ML
PM NICOTINE INTERP: POSITIVE
PM NORBUPRENORPHINE: NEGATIVE NG/ML
PM NORDIAZEPAM: NEGATIVE NG/ML
PM NORFENTANYL: NEGATIVE NG/ML
PM NORMEPERIDINE: NEGATIVE NG/ML
PM NOROXYCODONE: NEGATIVE NG/ML
PM OPIOID INTERP: NEGATIVE
PM OXAZEPAM: NEGATIVE NG/ML
PM OXXYCODONE INTERP: NEGATIVE
PM OXYCODONE: NEGATIVE NG/ML
PM OXYMORPHONE: NEGATIVE NG/ML
PM PCP: NEGATIVE NG/ML
PM PHENCYCLIDINE INTERP: NEGATIVE
PM PHENOBARBITAL: NEGATIVE NG/ML
PM PPX: NEGATIVE NG/ML
PM PROPOXYPHENE INTERP: NEGATIVE
PM SECOBARBITAL: NEGATIVE NG/ML
PM SUFENTANIL: NEGATIVE NG/ML
PM TAPENTADOL: NEGATIVE NG/ML
PM TEMAZEPAM: NEGATIVE NG/ML
PM TRAMADOL INTERP: POSITIVE
PM TRAMADOL: >1000 NG/ML

## 2023-12-14 LAB
ALBUMIN SERPL ELPH-MCNC: 4.4 G/DL
ALP BLD-CCNC: 106 U/L
ALT SERPL-CCNC: 26 U/L
AST SERPL-CCNC: 27 U/L
BASOPHILS # BLD AUTO: 0.11 K/UL
BASOPHILS NFR BLD AUTO: 1.5 %
BILIRUB DIRECT SERPL-MCNC: <0.2 MG/DL
BILIRUB INDIRECT SERPL-MCNC: >0.3 MG/DL
BILIRUB SERPL-MCNC: 0.5 MG/DL
EOSINOPHIL # BLD AUTO: 0.26 K/UL
EOSINOPHIL NFR BLD AUTO: 3.5 %
ESTRADIOL SERPL-MCNC: 26 PG/ML
HCT VFR BLD CALC: 45.7 %
HGB BLD-MCNC: 15 G/DL
IMM GRANULOCYTES NFR BLD AUTO: 0.7 %
LH SERPL-ACNC: <0.3 IU/L
LYMPHOCYTES # BLD AUTO: 1.43 K/UL
LYMPHOCYTES NFR BLD AUTO: 19.5 %
MAN DIFF?: NORMAL
MCHC RBC-ENTMCNC: 32 PG
MCHC RBC-ENTMCNC: 32.8 G/DL
MCV RBC AUTO: 97.4 FL
MONOCYTES # BLD AUTO: 0.86 K/UL
MONOCYTES NFR BLD AUTO: 11.7 %
NEUTROPHILS # BLD AUTO: 4.64 K/UL
NEUTROPHILS NFR BLD AUTO: 63.1 %
PLATELET # BLD AUTO: 200 K/UL
PROT SERPL-MCNC: 6.7 G/DL
RBC # BLD: 4.69 M/UL
RBC # FLD: 13 %
SHBG SERPL-SCNC: 65.1 NMOL/L
WBC # FLD AUTO: 7.35 K/UL

## 2023-12-17 ENCOUNTER — NON-APPOINTMENT (OUTPATIENT)
Age: 78
End: 2023-12-17

## 2023-12-18 LAB
TESTOSTERONE FREE/WEAKLY BND: 60.9 NG/DL
TESTOSTERONE TOTAL S: 525 NG/DL
TESTOSTERONE, % FREE/WEAKLY BND: 11.6 %

## 2023-12-26 ENCOUNTER — RESULT REVIEW (OUTPATIENT)
Age: 78
End: 2023-12-26

## 2023-12-26 ENCOUNTER — APPOINTMENT (OUTPATIENT)
Dept: NEUROSURGERY | Facility: CLINIC | Age: 78
End: 2023-12-26
Payer: OTHER MISCELLANEOUS

## 2023-12-26 DIAGNOSIS — M48.061 SPINAL STENOSIS, LUMBAR REGION WITHOUT NEUROGENIC CLAUDICATION: ICD-10-CM

## 2023-12-26 PROCEDURE — 99442: CPT

## 2023-12-26 NOTE — HISTORY OF PRESENT ILLNESS
[FreeTextEntry1] : This telephonic visit was provided via audio only technology. The patient, Jose Roberto Mustafa, was located at home at the time of the visit. The provider, Maritza Colin PA-C. was located at the medical office located in [address] [place] [address] at the time of the visit. The patient and Provider participated in the telephonic visit. Verbal consent for telephonic services was given on.  Mr. MUSTAFA underwent a telemedicine encounter to review CT lumbar results. Study was to be done in tandem with MR L spine which has yet to be arrive due to compatibility issues of his pacemaker device (Medtronic). From our discussion, it appears as though the MRI cannot be completed therefore I will have to order a CT myelogram as the plain film CT reveals evidence of L3-4 L4-5 disc herniations with neuroforaminal/central cord narrowing. In order to further assess such pathology, and to determine if additional neurosurgical intervention is warranted, a myelogram is absolutely necessary to further evaluate central and neuroforaminal stenosis.  EMG reveals evidence of diffuse polyneuropathy and chronic L3-S1 radiculopathy. CT L spine- 12/18/2023- [facility]- 1. Diffusely demineralized osseous structures. 2. Multilevel degenerative changes of the lumbar spine, superimposed upon mild upper lumbar dextroconvex curvature and congenitally short pedicles, with interspinous devices at L3-4 and L4-5. 3. At L4-5, Grade I anterolisthesis. Moderate to large right foraminal/far lateral superior disc extrusion encroaching upon the exiting right L4 nerve root. Severe right and moderate to severe left foraminal stenosis, with encroachment of the exiting left L4 nerve root. 4. At L2-3, mild to moderate central and bilateral subarticular zone stenosis. Moderate to severe left and moderate right foraminal stenosis, with encroachment of the exiting left L2 nerve root. 5. At L1-2, mild central stenosis. Mild to moderate (left greater than right) biforaminal stenosis. 6. At L5-S1, mild to moderate (left greater than right) biforaminal stenosis, with contact of the exiting left L5 nerve root.  PHYSICAL EXAM cannot be completed due to telemedicine nature of our exam.

## 2023-12-26 NOTE — ASSESSMENT
[FreeTextEntry1] : Mr. MUSTAFA is a 79 yo M who presents for neurosurgical telemedicine follow-up for CT L Spine/ EMG testing review. He is aware of the evidence of disc displacement at L3-4 L4-5 with associated moderate-severe neuroforaminal narrowing. MR L spine was indicated but due to issues arriving at compatibility with his pacer device the study was not scheduled. I have advised that we can order a CT myelogram which would provide more precise insight with regards to the degree of central/neuroforaminal stenosis.  Mr. Mustafa remains very frustrated and angry with regards to the results and his chronic pain. I have reassured him that we are working towards a diagnosis and in the interim he can consider additional injection treatments with Dr. Phillips to temporize the severity of his pain. He has a visit with Pain Mangement scheduled for later this week and will keep me abreast to any new developments.  (CT Myelogram ordered along with PT/PTT/INR)  TOTAL ENCOUNTER TIME 20 minutes  Maritza Colin PA-C

## 2023-12-27 ENCOUNTER — APPOINTMENT (OUTPATIENT)
Dept: PAIN MANAGEMENT | Facility: CLINIC | Age: 78
End: 2023-12-27

## 2023-12-29 ENCOUNTER — APPOINTMENT (OUTPATIENT)
Dept: PAIN MANAGEMENT | Facility: CLINIC | Age: 78
End: 2023-12-29
Payer: OTHER MISCELLANEOUS

## 2023-12-29 VITALS — BODY MASS INDEX: 32.21 KG/M2 | WEIGHT: 225 LBS | HEIGHT: 70 IN

## 2023-12-29 PROCEDURE — 99214 OFFICE O/P EST MOD 30 MIN: CPT

## 2023-12-29 NOTE — ASSESSMENT
[FreeTextEntry1] : Mr. Irizarry is a 78-year-old gentleman who sustained a work-related accident in 1995 affecting his neck and lower back. He previously underwent a right SI joint injection on 8/03/23 with no relief. Updated CT of the lumbar spine was reviewed and he will continue to follow with neurosurgery for further evaluation. We will continue to decrease his Oxycodone 10 mg from 75 pills to 60 pills this month.  He will follow-up in 4 weeks at which time we will continue to wean him off the medication. All this patients questions were answered and the conversation was understood well.  I explained the risk of addiction, tolerance and withdrawals. UDS will be done randomly and a drug agreement was signed.  I advised the patient they must keep their medication under a lock and key, or in a safe place away from children or other individuals. This medication given is solely for the patient and under no circumstances to be shared. Patient verbalized this and is in agreement with the aforementioned. I explained the risk of addiction, tolerance, and withdrawals. UDS will be done randomly and a drug agreement was signed.  I, Betzaida Worrell, attest that this documentation has been prepared under the direction and in the presence of Provider Kathie Phillips MD.   Thank you for allowing me to assist in the management of this patient.    Best Regards,    Kathie Phillips M.D., FAAPMR   Diplomate, American Board of Physical Medicine and Rehabilitation Diplomate, American Board of Pain Medicine  Diplomate, American Board of Pain Management

## 2023-12-29 NOTE — PHYSICAL EXAM
[Normal Coordination] : normal coordination [Normal DTR UE/LE] : normal DTR UE/LE  [Normal Sensation] : normal sensation [Normal Mood and Affect] : normal mood and affect [Orientated] : orientated [Able to Communicate] : able to communicate [Well Developed] : well developed [Well Nourished] : well nourished [Right] : right hip [de-identified] : Examination of the neck is as follows: \par  Inspection: no scars, no erythema, no ecchymosis, no palpable masses. \par  Palpation (Bilateral): paracervical spasm, paracervical tenderness, but no trapezial spasm, no trapezial tenderness, no rhomboid spasm, no rhomboid tenderness. \par  ROM: diminished ROM in all planes. Pain at extremes of: flexion, extension, rotation to left, rotation to right. Pain radiates to left arm with motion. Numbness radiates to left arm with motion. Pain radiates to right arm with motion. Numbness radiates to right arm with motion. \par  Strength: motor exam is non-focal throughout both upper extremities. \par  \par  Examination of the spine is as follows: \par  Inspection: incisions clean and dry, skin intact, no atrophy, no erythema, no ecchymosis, no palpable masses, no swelling, no sign of infection, no induration. \par  Palpation (Bilateral): lumbar paraspinal spasm, lumbar paraspinal tenderness, sciatic nerve tenderness, numbness/tingling of leg, numbness/tingling of foot, but no thoracic paraspinal spasm. \par  ROM: diminished ROM in all planes. \par  Strength: motor exam is non-focal throughout both lower extremities. \par  Testing (Bilateral): positive sitting straight leg raise. \par  \par  Gait: mildly antalgic,   Does not use an assistive device. [] : no focal motor deficits

## 2023-12-29 NOTE — ADDENDUM
[FreeTextEntry1] : He is s/p L1-2, L3-5 decompressive laminectomy with CoFlex in December 2022. Patient was originally doing well following the laminectomy until July when he noticed a recurrence of his pain.  Pain currently radiates into the right buttock down the anterior thigh to the shin on the right. At this time, we are suspecting recurring stenosis. Symptoms are compatible with a right L3-4 radiculopathy. We will order EMG/NCV of the lower extremities to r/o radiculopathy.

## 2023-12-29 NOTE — HISTORY OF PRESENT ILLNESS
[FreeTextEntry1] : ORIGINAL PRESENTATION: Mr. Irizarry is a 78-year-old male who continues to follow up with us for chronic cervical and lumbar pain radiating into the upper extremities related to a 1995 work injury. He has undergone cervical fusion without significant improvement. Medication management has continued as well as intermittent interventional procedures in the form of trigger point injections as well as cervical epidural procedures. He remains content with his response to medication management in the form of oxycodone IR which is consumed up to three times daily.  He states his pain is of undetermined origin, moderate to severe, constant, in no typical pattern. He states it is pressure-like, dull aching, and throbbing. He denies any weakness in the upper lower extremities. He states that lying down standing sitting walking and exercise increases his pain. Denies any recent change in bowel or bladder habits.  ACTIVITIES: He states he can walk 3 blocks before his pain begins. He can sit and stand for 30 minutes before his pain begins. In a day he sometimes has lie down because of pain. He currently uses no assistive walking device.  PRIOR PAIN TREATMENTS: Surgery by Dr. Henderson in 1996 gave him moderate relief.  PRIOR PAIN MEDICATIONS: Tylenol, cyclobenzaprine, oxycodone, Percocet.  PATIENT PRESENTS FOR FOLLOW UP: He is under our care for chronic cervical and lumbar pain radiating into the upper and lower extremities related to a 1995 work injury. He previously underwent an L1-2, L3-5 decompressive laminectomy w/ coflex with Dr. Henderson on 12/22/22. Cervical pain has been stable. He is s/p a right SI joint injection on 8/03/23 with no relief. He states that the pain has gotten worse and now he is struggling to put pressure on the right leg as it exacerbates the pain in his right buttock region. Pain radiates into the right hamstring. He was seen by orthopedics which ruled out his right hip as the source of pain. He recently underwent an updated CT of the lumbar spine but was not able to complete the MRI due to his pacemaker. He will have to undergo a CT myelogram.  He is currently managed on oxycodone 10 mg,  75 pill per month. This month he is aware that we will decrease the dosage to 10 mg BID, 60 per month.  Of note, patient has repeat inconsistent UDS + for tramadol. He denies taking tramadol.

## 2023-12-29 NOTE — DATA REVIEWED
[FreeTextEntry1] : CT of the lumbar spine dated 12/18/2023 finds diffusely demineralized osseous structures.  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  At L4-5 grade 1 anterolisthesis.  Moderate to large right foraminal/far lateral superior disc extrusion encroaching upon the exiting right L4 nerve root.  Severe right and moderate to severe left foraminal stenosis, with encroachment of the exiting left L4 nerve root.  At L2-3 mild to moderate central and bilateral subarticular zone stenosis.  Moderate to severe left and moderate right foraminal stenosis, with encroachment of the exiting left L2 nerve root.  At L1-2 mild central stenosis.  Mild to moderate left greater than right by foraminal stenosis.  At L5-S1, mild to moderate left greater than right bilateral foraminal stenosis, with contact of the exiting left L5 nerve root.  X-ray of the bilateral hips dated 8/28/23 finds No acute fracture is seen. There are mild-moderate degenerative changes of the hips bilaterally.  12/2020: CT L spine: moderate-severe lumbar stenosis, L4-5. Severe spinal stenosis along with foraminal narrowing as well at that site.  SOAPP: moderate on 4/19/23 Patient has a combination of moderate rise SOAP and no risk factors. UDS would be repeated randomly every quarter.  UDS: 11/24/23, +Tram, inconsistent.   [place] REGISTRY: Checked

## 2024-01-10 LAB
APTT BLD: 30.7 SEC
INR PPP: 1.01 RATIO
PT BLD: 11.4 SEC

## 2024-01-11 LAB — ESTRADIOL SERPL-MCNC: 24 PG/ML

## 2024-01-12 ENCOUNTER — APPOINTMENT (OUTPATIENT)
Dept: ORTHOPEDIC SURGERY | Facility: CLINIC | Age: 79
End: 2024-01-12

## 2024-01-16 LAB
SHBG SERPL-SCNC: 57.1 NMOL/L
TESTOSTERONE FREE/WEAKLY BND: 60.2 NG/DL
TESTOSTERONE TOTAL S: 436 NG/DL
TESTOSTERONE, % FREE/WEAKLY BND: 13.8 %

## 2024-01-22 ENCOUNTER — APPOINTMENT (OUTPATIENT)
Dept: UROLOGY | Facility: CLINIC | Age: 79
End: 2024-01-22

## 2024-01-26 ENCOUNTER — APPOINTMENT (OUTPATIENT)
Dept: PAIN MANAGEMENT | Facility: CLINIC | Age: 79
End: 2024-01-26
Payer: OTHER MISCELLANEOUS

## 2024-01-26 DIAGNOSIS — M53.3 SACROCOCCYGEAL DISORDERS, NOT ELSEWHERE CLASSIFIED: ICD-10-CM

## 2024-01-26 DIAGNOSIS — G89.29 SACROCOCCYGEAL DISORDERS, NOT ELSEWHERE CLASSIFIED: ICD-10-CM

## 2024-01-26 DIAGNOSIS — M51.16 INTERVERTEBRAL DISC DISORDERS WITH RADICULOPATHY, LUMBAR REGION: ICD-10-CM

## 2024-01-26 DIAGNOSIS — M54.2 CERVICALGIA: ICD-10-CM

## 2024-01-26 PROCEDURE — 99214 OFFICE O/P EST MOD 30 MIN: CPT | Mod: ACP

## 2024-01-26 NOTE — HISTORY OF PRESENT ILLNESS
[FreeTextEntry1] : ORIGINAL PRESENTATION: Mr. Irizarry is a 78-year-old male who continues to follow up with us for chronic cervical and lumbar pain radiating into the upper extremities related to a 1995 work injury. He has undergone cervical fusion without significant improvement. Medication management has continued as well as intermittent interventional procedures in the form of trigger point injections as well as cervical epidural procedures. He remains content with his response to medication management in the form of oxycodone IR which is consumed up to three times daily.  He states his pain is of undetermined origin, moderate to severe, constant, in no typical pattern. He states it is pressure-like, dull aching, and throbbing. He denies any weakness in the upper lower extremities. He states that lying down standing sitting walking and exercise increases his pain. Denies any recent change in bowel or bladder habits.  ACTIVITIES: He states he can walk 3 blocks before his pain begins. He can sit and stand for 30 minutes before his pain begins. In a day he sometimes has lie down because of pain. He currently uses no assistive walking device.  PRIOR PAIN TREATMENTS: Surgery by Dr. Henderson in 1996 gave him moderate relief.  PRIOR PAIN MEDICATIONS: Tylenol, cyclobenzaprine, oxycodone, Percocet.  PATIENT PRESENTS FOR FOLLOW UP:  Last seen on 12/29/2023 and since then there has been no new complaints or acute changes to his condition. He is under our care for chronic cervical and lumbar pain radiating into the upper and lower extremities related to a 1995 work injury. He previously underwent an L1-2, L3-5 decompressive laminectomy w/ coflex with Dr. Henderson on 12/22/22. Cervical pain has been stable. He is s/p a right SI joint injection on 8/03/23 with no relief. He states that the pain has gotten worse and now he is struggling to put pressure on the right leg as it exacerbates the pain in his right buttock region. Pain radiates into the right hamstring. He was seen by orthopedics which ruled out his right hip as the source of pain. He recently underwent an updated CT of the lumbar spine but was not able to complete the MRI due to his pacemaker. He will have to undergo a CT myelogram which has not been authorized yet by his insurance carrier.  He is currently managed on Oxycodone 10 mg,  BID. Patient reports today that he would like to transfer his care to a different Pain Management provider outside of practice.  Of note, patient has repeat inconsistent UDS + for tramadol.

## 2024-01-26 NOTE — DISCUSSION/SUMMARY
[Medication Risks Reviewed] : Medication risks reviewed [de-identified] : Mr. Irizarry is a 78-year-old gentleman who sustained a work-related accident in 1995 affecting his neck and lower back. He previously underwent a right SI joint injection on 8/03/23 with no relief. Updated CT of the lumbar spine was reviewed and he will continue to follow with neurosurgery for further evaluation. He is currently managed on Oxycodone 10 mg,  BID. Patient reports today that he would like to transfer his care to a different Pain Management provider outside of practice. Disengagement letter was given today and the last script has been sent to the pharmacy.   I advised the patient they must keep their medication under a lock and key, or in a safe place away from children or other individuals. This medication given is solely for the patient and under no circumstances to be shared. Patient verbalized this and is in agreement with the aforementioned. I explained the risk of addiction, tolerance, and withdrawals. UDS will be done randomly and a drug agreement was signed.  Total clinician time spent today on the patient is 30 minutes including preparing to see the patient, obtaining and/or reviewing and confirming history, performing medically necessary and appropriate examination, counseling and educating the patient and/or family, documenting clinical information in the EHR and communicating and/or referring to other healthcare professionals.  LUZ Holliday M.D., FAAPMR

## 2024-01-26 NOTE — PHYSICAL EXAM
[Normal Coordination] : normal coordination [Normal DTR UE/LE] : normal DTR UE/LE  [Normal Sensation] : normal sensation [Normal Mood and Affect] : normal mood and affect [Orientated] : orientated [Able to Communicate] : able to communicate [Well Developed] : well developed [Well Nourished] : well nourished [de-identified] : Examination of the neck is as follows: \par  Inspection: no scars, no erythema, no ecchymosis, no palpable masses. \par  Palpation (Bilateral): paracervical spasm, paracervical tenderness, but no trapezial spasm, no trapezial tenderness, no rhomboid spasm, no rhomboid tenderness. \par  ROM: diminished ROM in all planes. Pain at extremes of: flexion, extension, rotation to left, rotation to right. Pain radiates to left arm with motion. Numbness radiates to left arm with motion. Pain radiates to right arm with motion. Numbness radiates to right arm with motion. \par  Strength: motor exam is non-focal throughout both upper extremities. \par  \par  Examination of the spine is as follows: \par  Inspection: incisions clean and dry, skin intact, no atrophy, no erythema, no ecchymosis, no palpable masses, no swelling, no sign of infection, no induration. \par  Palpation (Bilateral): lumbar paraspinal spasm, lumbar paraspinal tenderness, sciatic nerve tenderness, numbness/tingling of leg, numbness/tingling of foot, but no thoracic paraspinal spasm. \par  ROM: diminished ROM in all planes. \par  Strength: motor exam is non-focal throughout both lower extremities. \par  Testing (Bilateral): positive sitting straight leg raise. \par  \par  Gait: mildly antalgic,   Does not use an assistive device. [Right] : right hip [] : patient ambulates without assistive device

## 2024-02-07 ENCOUNTER — APPOINTMENT (OUTPATIENT)
Dept: ORTHOPEDIC SURGERY | Facility: CLINIC | Age: 79
End: 2024-02-07
Payer: MEDICARE

## 2024-02-07 PROCEDURE — 99213 OFFICE O/P EST LOW 20 MIN: CPT | Mod: 25

## 2024-02-07 PROCEDURE — 99203 OFFICE O/P NEW LOW 30 MIN: CPT | Mod: 25

## 2024-02-07 PROCEDURE — 20611 DRAIN/INJ JOINT/BURSA W/US: CPT | Mod: RT

## 2024-02-07 NOTE — DISCUSSION/SUMMARY
[de-identified] : Today I recommend a cortisone injection for the right knee, the patient agreed.  The right knee was injected with 2 cc lidocaine, 1 cc dexamethasone.  Procedure note generated.  I also recommend Euflexxa series injections for the right knee, please send authorization for that.  I will see him back in 6 weeks for further evaluation and to start Euflexxa injections.  He has tried oral anti-inflammatories, topical anti-inflammatory gel with minimal relief and is indicated for viscous injections. [FreeTextEntry1] : 6 weeks status post right distal radius fracture.  She is doing well and her pain is improved.\par \par

## 2024-02-07 NOTE — IMAGING
[de-identified] : Physical exam of the right knee: There is slight valgus alignment.  No effusion, no erythema, no ecchymosis.  Full extension.  Flexion to 120 degrees with pain.  Tenderness to palpation over the lateral joint line.  No tenderness to palpation over the medial joint line or the collateral ligaments.  Stable to varus and valgus stress testing.  Intact to light touch, nonantalgic gait.

## 2024-02-07 NOTE — HISTORY OF PRESENT ILLNESS
[de-identified] : The patient is a 78-year-old male here for a subsequent reevaluation of his right knee.  He has osteoarthritis.  X-ray showed moderate lateral compartment space narrowing, almost bone-on-bone with spurring.  He has pain over the lateral aspect of the right knee.  He has pain going up and down stairs and going from a seated to standing position.  He had a cortisone injection at his previous visit here which provided him with some relief.  He has tried Celexa,, diclofenac gel, Tylenol with minimal relief.

## 2024-02-13 ENCOUNTER — APPOINTMENT (OUTPATIENT)
Age: 79
End: 2024-02-13
Payer: OTHER MISCELLANEOUS

## 2024-02-13 ENCOUNTER — RESULT REVIEW (OUTPATIENT)
Age: 79
End: 2024-02-13

## 2024-02-13 ENCOUNTER — OUTPATIENT (OUTPATIENT)
Dept: OUTPATIENT SERVICES | Facility: HOSPITAL | Age: 79
LOS: 1 days | Discharge: HOME | End: 2024-02-13
Payer: OTHER MISCELLANEOUS

## 2024-02-13 ENCOUNTER — APPOINTMENT (OUTPATIENT)
Age: 79
End: 2024-02-13

## 2024-02-13 VITALS — RESPIRATION RATE: 18 BRPM | HEART RATE: 88 BPM | SYSTOLIC BLOOD PRESSURE: 138 MMHG | DIASTOLIC BLOOD PRESSURE: 83 MMHG

## 2024-02-13 VITALS
RESPIRATION RATE: 18 BRPM | SYSTOLIC BLOOD PRESSURE: 139 MMHG | HEART RATE: 86 BPM | OXYGEN SATURATION: 95 % | TEMPERATURE: 99 F | DIASTOLIC BLOOD PRESSURE: 79 MMHG

## 2024-02-13 DIAGNOSIS — Z90.89 ACQUIRED ABSENCE OF OTHER ORGANS: Chronic | ICD-10-CM

## 2024-02-13 DIAGNOSIS — M48.061 SPINAL STENOSIS, LUMBAR REGION WITHOUT NEUROGENIC CLAUDICATION: ICD-10-CM

## 2024-02-13 DIAGNOSIS — I25.10 ATHEROSCLEROTIC HEART DISEASE OF NATIVE CORONARY ARTERY WITHOUT ANGINA PECTORIS: Chronic | ICD-10-CM

## 2024-02-13 DIAGNOSIS — Z95.810 PRESENCE OF AUTOMATIC (IMPLANTABLE) CARDIAC DEFIBRILLATOR: Chronic | ICD-10-CM

## 2024-02-13 DIAGNOSIS — M51.16 INTERVERTEBRAL DISC DISORDERS WITH RADICULOPATHY, LUMBAR REGION: ICD-10-CM

## 2024-02-13 DIAGNOSIS — M54.50 LOW BACK PAIN, UNSPECIFIED: ICD-10-CM

## 2024-02-13 LAB
APPEARANCE CSF: CLEAR — SIGNIFICANT CHANGE UP
APPEARANCE SPUN FLD: COLORLESS — SIGNIFICANT CHANGE UP
COLOR CSF: COLORLESS — SIGNIFICANT CHANGE UP
GLUCOSE CSF-MCNC: 59 MG/DL — SIGNIFICANT CHANGE UP (ref 45–75)
NEUTROPHILS # CSF: SIGNIFICANT CHANGE UP % (ref 0–6)
NRBC NFR CSF: 1 /UL — SIGNIFICANT CHANGE UP (ref 0–5)
PROT CSF-MCNC: 69 MG/DL — HIGH (ref 15–45)
RBC # CSF: 0 /UL — SIGNIFICANT CHANGE UP (ref 0–0)
TUBE TYPE: SIGNIFICANT CHANGE UP

## 2024-02-13 PROCEDURE — 82438 ASSAY OTHER FLUID CHLORIDES: CPT

## 2024-02-13 PROCEDURE — 72131 CT LUMBAR SPINE W/O DYE: CPT

## 2024-02-13 PROCEDURE — 77003 FLUOROGUIDE FOR SPINE INJECT: CPT

## 2024-02-13 PROCEDURE — 89051 BODY FLUID CELL COUNT: CPT

## 2024-02-13 PROCEDURE — 82945 GLUCOSE OTHER FLUID: CPT

## 2024-02-13 PROCEDURE — 62284 INJECTION FOR MYELOGRAM: CPT

## 2024-02-13 PROCEDURE — 77003 FLUOROGUIDE FOR SPINE INJECT: CPT | Mod: 26

## 2024-02-13 PROCEDURE — 72131 CT LUMBAR SPINE W/O DYE: CPT | Mod: 26

## 2024-02-13 PROCEDURE — 84157 ASSAY OF PROTEIN OTHER: CPT

## 2024-02-14 DIAGNOSIS — M51.16 INTERVERTEBRAL DISC DISORDERS WITH RADICULOPATHY, LUMBAR REGION: ICD-10-CM

## 2024-02-14 DIAGNOSIS — M48.061 SPINAL STENOSIS, LUMBAR REGION WITHOUT NEUROGENIC CLAUDICATION: ICD-10-CM

## 2024-02-16 ENCOUNTER — LABORATORY RESULT (OUTPATIENT)
Age: 79
End: 2024-02-16

## 2024-02-21 ENCOUNTER — APPOINTMENT (OUTPATIENT)
Dept: NEUROSURGERY | Facility: CLINIC | Age: 79
End: 2024-02-21
Payer: OTHER MISCELLANEOUS

## 2024-02-21 VITALS — HEIGHT: 70 IN | BODY MASS INDEX: 32.21 KG/M2 | WEIGHT: 225 LBS

## 2024-02-21 PROCEDURE — 99214 OFFICE O/P EST MOD 30 MIN: CPT

## 2024-02-21 NOTE — ASSESSMENT
[FreeTextEntry1] : This is a 79 yo M who presents for neurosurgical revisit with regards to persistent lumbar pain complaints. Updated CT Myelogram available for my review. I have discussed with Mr. Irizarry that there is an adequate decompression post-operatively with improvements appreciated in comparison to pre-operative films from 10/2022.  He continues to have a large component of isolated lumbago likely secondary to advanced degenerative changes/facet arthropathy. I have suggested that he proceed with Pain Management interventions in the for of lumbar MBB/RFA to alleviate his isolated low back pain complaints. He remains hesitant to consider additional neurosurgical efforts and I have cautioned him as well. I do not believe that he would receive quality of life gains/benefit through spinal fusion and he is understanding at this time. Possibly future consideration for SCS trial/permanent implant can also be considered.  He will return to our office as needed moving forward and will maintain close Pain Management f/u for chronic pain control.  Maritza Colin PA-C

## 2024-02-22 ENCOUNTER — APPOINTMENT (OUTPATIENT)
Dept: UROLOGY | Facility: CLINIC | Age: 79
End: 2024-02-22
Payer: MEDICARE

## 2024-02-22 VITALS
BODY MASS INDEX: 33.93 KG/M2 | SYSTOLIC BLOOD PRESSURE: 143 MMHG | HEIGHT: 70 IN | WEIGHT: 237 LBS | OXYGEN SATURATION: 97 % | HEART RATE: 92 BPM | DIASTOLIC BLOOD PRESSURE: 87 MMHG

## 2024-02-22 PROCEDURE — 99214 OFFICE O/P EST MOD 30 MIN: CPT | Mod: 25

## 2024-02-22 PROCEDURE — 11980 IMPLANT HORMONE PELLET(S): CPT

## 2024-02-22 NOTE — PHYSICAL EXAM
[General Appearance - Well Developed] : well developed [General Appearance - Well Nourished] : well nourished [Well Groomed] : well groomed [Normal Appearance] : normal appearance [Edema] : no peripheral edema [General Appearance - In No Acute Distress] : no acute distress [Respiration, Rhythm And Depth] : normal respiratory rhythm and effort [Exaggerated Use Of Accessory Muscles For Inspiration] : no accessory muscle use [Auscultation Breath Sounds / Voice Sounds] : lungs were clear to auscultation bilaterally [Abdomen Soft] : soft [Abdomen Tenderness] : non-tender [Costovertebral Angle Tenderness] : no ~M costovertebral angle tenderness [Normal Station and Gait] : the gait and station were normal for the patient's age [] : no rash [No Focal Deficits] : no focal deficits [Oriented To Time, Place, And Person] : oriented to person, place, and time [Affect] : the affect was normal [Mood] : the mood was normal [Not Anxious] : not anxious [de-identified] : implant in good position
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
## 2024-02-22 NOTE — LETTER BODY
[Dear  ___] : Dear  [unfilled], [Courtesy Letter:] : I had the pleasure of seeing your patient, [unfilled], in my office today. [Please see my note below.] : Please see my note below. [Sincerely,] : Sincerely, [FreeTextEntry2] : Aamir Woods MD [address] [place] [id number]

## 2024-02-22 NOTE — LETTER HEADER
[FreeTextEntry3] : Mohan Hernández MD [facility], [address] [place] [address]

## 2024-03-15 ENCOUNTER — LABORATORY RESULT (OUTPATIENT)
Age: 79
End: 2024-03-15

## 2024-03-20 ENCOUNTER — APPOINTMENT (OUTPATIENT)
Dept: ORTHOPEDIC SURGERY | Facility: CLINIC | Age: 79
End: 2024-03-20

## 2024-03-20 ENCOUNTER — APPOINTMENT (OUTPATIENT)
Dept: UROLOGY | Facility: CLINIC | Age: 79
End: 2024-03-20
Payer: MEDICARE

## 2024-03-20 VITALS
HEIGHT: 70 IN | DIASTOLIC BLOOD PRESSURE: 83 MMHG | SYSTOLIC BLOOD PRESSURE: 143 MMHG | HEART RATE: 95 BPM | OXYGEN SATURATION: 96 % | BODY MASS INDEX: 35.22 KG/M2 | WEIGHT: 246 LBS

## 2024-03-20 DIAGNOSIS — R68.82 DECREASED LIBIDO: ICD-10-CM

## 2024-03-20 DIAGNOSIS — R79.89 OTHER SPECIFIED ABNORMAL FINDINGS OF BLOOD CHEMISTRY: ICD-10-CM

## 2024-03-20 DIAGNOSIS — D64.9 ANEMIA, UNSPECIFIED: ICD-10-CM

## 2024-03-20 DIAGNOSIS — N52.9 MALE ERECTILE DYSFUNCTION, UNSPECIFIED: ICD-10-CM

## 2024-03-20 LAB
BASOPHILS # BLD AUTO: 0.05 K/UL
BASOPHILS NFR BLD AUTO: 0.8 %
EOSINOPHIL # BLD AUTO: 0.06 K/UL
EOSINOPHIL NFR BLD AUTO: 0.9 %
HCT VFR BLD CALC: 40 %
HGB BLD-MCNC: 12.5 G/DL
IMM GRANULOCYTES NFR BLD AUTO: 0.5 %
LYMPHOCYTES # BLD AUTO: 1.1 K/UL
LYMPHOCYTES NFR BLD AUTO: 16.7 %
MAN DIFF?: NORMAL
MCHC RBC-ENTMCNC: 30 PG
MCHC RBC-ENTMCNC: 31.3 G/DL
MCV RBC AUTO: 95.9 FL
MONOCYTES # BLD AUTO: 0.84 K/UL
MONOCYTES NFR BLD AUTO: 12.8 %
NEUTROPHILS # BLD AUTO: 4.49 K/UL
NEUTROPHILS NFR BLD AUTO: 68.3 %
PLATELET # BLD AUTO: 199 K/UL
PMV BLD AUTO: 0 /100 WBCS
RBC # BLD: 4.17 M/UL
RBC # FLD: 15.8 %
WBC # FLD AUTO: 6.57 K/UL

## 2024-03-20 PROCEDURE — G2211 COMPLEX E/M VISIT ADD ON: CPT

## 2024-03-20 PROCEDURE — 36415 COLL VENOUS BLD VENIPUNCTURE: CPT

## 2024-03-20 PROCEDURE — 99214 OFFICE O/P EST MOD 30 MIN: CPT

## 2024-03-20 RX ORDER — ACETAMINOPHEN 325 MG/1
325 TABLET ORAL EVERY 6 HOURS
Qty: 168 | Refills: 0 | Status: DISCONTINUED | COMMUNITY
Start: 2021-05-14 | End: 2024-03-20

## 2024-03-20 RX ORDER — LIDOCAINE 5 G/100G
5 OINTMENT TOPICAL
Qty: 1 | Refills: 5 | Status: DISCONTINUED | COMMUNITY
Start: 2023-02-14 | End: 2024-03-20

## 2024-03-20 RX ORDER — DICLOFENAC SODIUM 1% 10 MG/G
1 GEL TOPICAL
Qty: 1 | Refills: 0 | Status: DISCONTINUED | COMMUNITY
Start: 2023-02-14 | End: 2024-03-20

## 2024-03-20 RX ORDER — LIDOCAINE 5% 700 MG/1
5 PATCH TOPICAL
Qty: 30 | Refills: 3 | Status: DISCONTINUED | COMMUNITY
Start: 2023-09-05 | End: 2024-03-20

## 2024-03-20 RX ORDER — OXYCODONE 10 MG/1
10 TABLET ORAL TWICE DAILY
Qty: 60 | Refills: 0 | Status: DISCONTINUED | COMMUNITY
Start: 2022-07-22 | End: 2024-03-20

## 2024-03-20 NOTE — LETTER HEADER
[FreeTextEntry3] : Mohan Hernández MD [facility], [address] [place] [address]

## 2024-03-20 NOTE — ASSESSMENT
[FreeTextEntry1] : The problem is not his hormones, not even the high normal estradiol, presumably because of his obesity as it is still within normal limits.  He has had neurosurgical issues he has had trouble with sensitivity but libido should still be there.  I am actually more concerned with the sudden drop in hemoglobin we will going to send him for a CBC and he will contact Dr. Houston as if his hemoglobin is really dropping that much he will need an evaluation.  I do not know if this is emotional or neurological it is easy enough to check Neurological and I would have him see her neurologist to see if there are any sensory issues that could be found diagnosed and even more importantly corrected.  From a urologic aspect he has normal hormones he has an implant so we can get rigidity and that is as far as I can take.  For now I will continue to give the Testopel as he needs testosterone levels for general health as a totality he tends to last at least 4 months so we will check his trough levels towards the end of May and have him come back in after the bloods.
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
## 2024-03-20 NOTE — LETTER BODY
[Dear  ___] : Dear  [unfilled], [Courtesy Letter:] : I had the pleasure of seeing your patient, [unfilled], in my office today. [Please see my note below.] : Please see my note below. [Sincerely,] : Sincerely, [FreeTextEntry2] : Aamir Woods MD\par  [address] [place] [address]\[place]  [place] [address] \par

## 2024-03-21 ENCOUNTER — APPOINTMENT (OUTPATIENT)
Dept: PAIN MANAGEMENT | Facility: CLINIC | Age: 79
End: 2024-03-21
Payer: OTHER MISCELLANEOUS

## 2024-03-21 DIAGNOSIS — M54.50 LOW BACK PAIN, UNSPECIFIED: ICD-10-CM

## 2024-03-21 PROCEDURE — 99214 OFFICE O/P EST MOD 30 MIN: CPT

## 2024-03-21 NOTE — ASSESSMENT
[FreeTextEntry1] : Mr. Irizarry is a 78-year-old gentleman who sustained a work-related accident in 1995 affecting his neck and lower back. He previously underwent a right SI joint injection on 8/03/23 with no relief. Updated CT of the lumbar spine was reviewed and he recently followed up with neurosurgery. He is awaiting to follow up with Dr. Henderson and in the meantime would like to proceed with a diagnostic RT L3, L4, L5 MBB w/ mac and will follow up afterwards. He had a defibrillator and is aware he will need cardiac clearance. All this patients questions were answered and the conversation was understood well.  Patient had paravertebral tenderness and pain on spinal movement with facet loading.  Patient has trialed rehab (home exercise, physical therapy or chiropractic care) and medications.  I will schedule a right diagnostic lumbar medial branch injection L3, L4, L5, if 70% immediate relief for greater than 30 minutes or 50% greater than 24 hours, would schedule RFA at lumbar medial branches.  If greater than 50% intermediate relief for 30 minutes, would schedule a second diagnostic lumbar medial branch block, and if greater than 50% intermediate relief for 30 minutes, would schedule a RFA at lumbar medial branches.  I, Betzaida Worrell, attest that this documentation has been prepared under the direction and in the presence of Provider Kathie Phillips MD.   Thank you for allowing me to assist in the management of this patient.    Best Regards,    Kathie Phillips M.D., FAAPMR   Diplomate, American Board of Physical Medicine and Rehabilitation Diplomate, American Board of Pain Medicine  
yes

## 2024-03-21 NOTE — HISTORY OF PRESENT ILLNESS
[FreeTextEntry1] : ORIGINAL PRESENTATION: Mr. Irizarry is a 78-year-old male who continues to follow up with us for chronic cervical and lumbar pain radiating into the upper extremities related to a 1995 work injury. He has undergone cervical fusion without significant improvement. Medication management has continued as well as intermittent interventional procedures in the form of trigger point injections as well as cervical epidural procedures. He remains content with his response to medication management in the form of oxycodone IR which is consumed up to three times daily.  He states his pain is of undetermined origin, moderate to severe, constant, in no typical pattern. He states it is pressure-like, dull aching, and throbbing. He denies any weakness in the upper lower extremities. He states that lying down standing sitting walking and exercise increases his pain. Denies any recent change in bowel or bladder habits.  ACTIVITIES: He states he can walk 3 blocks before his pain begins. He can sit and stand for 30 minutes before his pain begins. In a day he sometimes has lie down because of pain. He currently uses no assistive walking device.  PRIOR PAIN TREATMENTS: Surgery by Dr. Henderson in 1996 gave him moderate relief.  PRIOR PAIN MEDICATIONS: Tylenol, cyclobenzaprine, oxycodone, Percocet.   He is currently managed on Oxycodone 10 mg, BID. Patient reports today that he would like to transfer his care to a different Pain Management provider outside of practice.  Of note, patient has repeat inconsistent UDS + for tramadol.   PATIENT PRESENTS FOR FOLLOW UP: He was last seen in January and is under our care for chronic cervical and lumbar pain radiating into the upper and lower extremities related to a 1995 work injury. He previously underwent an L1-2, L3-5 decompressive laminectomy w/ coflex with Dr. Henderson on 12/22/22. Cervical pain has been stable. He then underwent a right SI joint injection on 8/03/23 with no relief. He states that the pain has gotten worse and now he is struggling to put pressure on the right leg as it exacerbates the pain in his right buttock region. Pain radiates into the right hamstring. The updated CT of the lumbar spine was reviewed and is documented below. He followed up with neurosurgery and they suggest he trial diagnostic lumbar medical branch blocks while he is awaiting to follow up with Dr. Henderson to discuss further surgical intervention. He is agreeable to move forward.   He was previously medically managed on Oxycodone 10 mg BID. He is no longer on the medication.

## 2024-03-21 NOTE — PHYSICAL EXAM
[Normal Coordination] : normal coordination [Normal DTR UE/LE] : normal DTR UE/LE  [Normal Sensation] : normal sensation [Normal Mood and Affect] : normal mood and affect [Oriented] : oriented [Able to Communicate] : able to communicate [Well Developed] : well developed [Well Nourished] : well nourished [Right] : right hip [de-identified] : Examination of the neck is as follows: \par  Inspection: no scars, no erythema, no ecchymosis, no palpable masses. \par  Palpation (Bilateral): paracervical spasm, paracervical tenderness, but no trapezial spasm, no trapezial tenderness, no rhomboid spasm, no rhomboid tenderness. \par  ROM: diminished ROM in all planes. Pain at extremes of: flexion, extension, rotation to left, rotation to right. Pain radiates to left arm with motion. Numbness radiates to left arm with motion. Pain radiates to right arm with motion. Numbness radiates to right arm with motion. \par  Strength: motor exam is non-focal throughout both upper extremities. \par  \par  Examination of the spine is as follows: \par  Inspection: incisions clean and dry, skin intact, no atrophy, no erythema, no ecchymosis, no palpable masses, no swelling, no sign of infection, no induration. \par  Palpation (Bilateral): lumbar paraspinal spasm, lumbar paraspinal tenderness, sciatic nerve tenderness, numbness/tingling of leg, numbness/tingling of foot, but no thoracic paraspinal spasm. \par  ROM: diminished ROM in all planes. \par  Strength: motor exam is non-focal throughout both lower extremities. \par  Testing (Bilateral): positive sitting straight leg raise. \par  \par  Gait: mildly antalgic,   Does not use an assistive device. [] : no focal motor deficits

## 2024-03-21 NOTE — DATA REVIEWED
[FreeTextEntry1] : CT of the lumbar myelogram dated 2/14/24 demonstrates interspinous spacers L3-4 and L4-5. Previous kyphoplasty L2. Mild stable loss of height L2 and lytic stable changes of the vertebral body. L3-L4: Severe lateral canal stenosis, foraminal stenosis, secondary to stable facet hypertrophy. Grade 1 stable L4-L5 spondylolisthesis (compared with 2022) moderate severe spinal and severe right greater than left foraminal stenosis. Overall, similar findings to a previous myelogram of 10/2022 with slight improvement of the degree of stenosis posterior to the L4 vertebral body.  CT of the lumbar spine dated 12/18/2023 finds diffusely demineralized osseous structures.  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  At L4-5 grade 1 anterolisthesis.  Moderate to large right foraminal/far lateral superior disc extrusion encroaching upon the exiting right L4 nerve root.  Severe right and moderate to severe left foraminal stenosis, with encroachment of the exiting left L4 nerve root.  At L2-3 mild to moderate central and bilateral subarticular zone stenosis.  Moderate to severe left and moderate right foraminal stenosis, with encroachment of the exiting left L2 nerve root.  At L1-2 mild central stenosis.  Mild to moderate left greater than right by foraminal stenosis.  At L5-S1, mild to moderate left greater than right bilateral foraminal stenosis, with contact of the exiting left L5 nerve root.  X-ray of the bilateral hips dated 8/28/23 finds No acute fracture is seen. There are mild-moderate degenerative changes of the hips bilaterally.  12/2020: CT L spine: moderate-severe lumbar stenosis, L4-5. Severe spinal stenosis along with foraminal narrowing as well at that site.  SOAPP: moderate on 4/19/23 Patient has a combination of moderate rise SOAP and no risk factors. UDS would be repeated randomly every quarter.  UDS: 11/24/23, +Tram, inconsistent.   [place] REGISTRY: Checked

## 2024-03-26 ENCOUNTER — NON-APPOINTMENT (OUTPATIENT)
Age: 79
End: 2024-03-26

## 2024-03-27 ENCOUNTER — APPOINTMENT (OUTPATIENT)
Dept: ORTHOPEDIC SURGERY | Facility: CLINIC | Age: 79
End: 2024-03-27

## 2024-04-02 ENCOUNTER — APPOINTMENT (OUTPATIENT)
Dept: PAIN MANAGEMENT | Facility: CLINIC | Age: 79
End: 2024-04-02
Payer: OTHER MISCELLANEOUS

## 2024-04-02 PROCEDURE — 64493 INJ PARAVERT F JNT L/S 1 LEV: CPT | Mod: RT

## 2024-04-02 PROCEDURE — 94761 N-INVAS EAR/PLS OXIMETRY MLT: CPT

## 2024-04-02 PROCEDURE — 93040 RHYTHM ECG WITH REPORT: CPT | Mod: 79

## 2024-04-02 PROCEDURE — 93770 DETERMINATION VENOUS PRESS: CPT

## 2024-04-02 PROCEDURE — 64495 INJ PARAVERT F JNT L/S 3 LEV: CPT | Mod: RT

## 2024-04-02 PROCEDURE — 64494 INJ PARAVERT F JNT L/S 2 LEV: CPT | Mod: RT

## 2024-04-02 NOTE — PROCEDURE
[FreeTextEntry3] : LUMBAR MEDIAL BRANCH INJECTION UNDER FLUOROSCOPY    Date:  2024  Patient: CHRISTINA MUSTAFA  :  1945   Preoperative Diagnosis: Lumbar spondylosis; facet arthropathy L3, L4., L5  Postoperative Diagnosis: Same  Procedure: Diagnostic Lumbar median branch nerve injection, right L3, L4, L5 under fluoroscopy  Physician: Kathie Phillips MD, FAAPMR  Anesthesia: See Nurses note. Lidocaine/Cold spray   Medical Necessity:  Failure of conservative management.  Indication for Fluoroscopy:  This procedure requires the precise placement of the spinal needle.  It is the only way to accurately and safely perform the injection.   CONSENT: The possible complications including infection, bleeding, nerve damage, Hospital admission, death or failure of the procedure; though unusual, are theoretically possible. The patient was educated about the of the procedure and alternative therapies. All questions were answered and the patient freely gave consent to proceed.   Monitoring:  Patient had continuous blood pressure, EKG, and pulse oximetry throughout the case. See nurse's notes.     Procedure Note:   After obtaining written consent, the patient was placed on the fluoroscopic table in the prone position. A betadine prep was performed and the area surrounded by sterile drapes. A time out was performed. Fluoroscopy unit was positioned over the patient and images of the spine (AP and oblique views) obtained.  The entry sites for the above left L3-S1 levels median branch were determined and cold spray was used to localize the area. At each level a 22guage 3  inch spinal needle was placed at the level of the median branch to the facet under fluoroscopic guidance.  A dose of Lidocaine 2% 1cc was administered at each level. The needles at each level were withdrawn following administration of the medication intact. There were no signs of, intravascular block or hypotension. The needle was removed intact. A band aid was place on the site.     Complications: None. The patient tolerated the procedure well.      Disposition: I have examined the patient and there are no new physical findings since the original presentation.  Sensory and motor function were intact. The patient met discharge criteria see nurses notes. The discharge instruction sheet was reviewed and given to the patient. The patient was discharged home with a .     Comment: If 70% relief greater than 2 hours or 50% greater than 24 hours would proceed to RFA. If 50% less than 24 hours would repeat to confirm for RFA. Follow in office. Call if any problems.     Indication for RFA: The pt had a positive response to medial branch diagnostic injections and is considered a good candidate for the thermal RF ablation procedure. The diagnostic injection provided at least 50% reduction in pain for the duration of the local anesthetic.  The following criteria have been met: 1) failed response to at least 3 months of conservative management; 2) patient has LBP that is non-radicular, suggesting facet joint origin supported by absence of nerve root compression documented on the medical record on H&P and radiographic evaluation; 3) minimum of 6 months has elapsed since any prior RF treatments. If prior ablation therapy has been performed, it provided at least 50% relief for minimum of 10-12 weeks; 4) no prior spinal fusion at the vertebral level being treated;    This document was electronically signed by:   Kathie Phillips MD, FAAPMR Diplomate, American Board of Physical Medicine and Rehabilitation Diplomate, American Board of Pain Medicine

## 2024-04-03 ENCOUNTER — APPOINTMENT (OUTPATIENT)
Dept: ORTHOPEDIC SURGERY | Facility: CLINIC | Age: 79
End: 2024-04-03

## 2024-04-18 ENCOUNTER — APPOINTMENT (OUTPATIENT)
Dept: PAIN MANAGEMENT | Facility: CLINIC | Age: 79
End: 2024-04-18
Payer: OTHER MISCELLANEOUS

## 2024-04-18 DIAGNOSIS — M46.1 SACROILIITIS, NOT ELSEWHERE CLASSIFIED: ICD-10-CM

## 2024-04-18 DIAGNOSIS — M47.816 SPONDYLOSIS W/OUT MYELOPATHY OR RADICULOPATHY, LUMBAR REGION: ICD-10-CM

## 2024-04-18 DIAGNOSIS — M48.061 SPINAL STENOSIS, LUMBAR REGION WITHOUT NEUROGENIC CLAUDICATION: ICD-10-CM

## 2024-04-18 PROCEDURE — 99214 OFFICE O/P EST MOD 30 MIN: CPT | Mod: ACP

## 2024-04-18 RX ORDER — HYDROXYZINE HYDROCHLORIDE 25 MG/1
25 TABLET ORAL
Qty: 60 | Refills: 3 | Status: ACTIVE | COMMUNITY
Start: 2018-09-26 | End: 1900-01-01

## 2024-04-18 RX ORDER — CYCLOBENZAPRINE HYDROCHLORIDE 10 MG/1
10 TABLET, FILM COATED ORAL
Qty: 90 | Refills: 3 | Status: ACTIVE | COMMUNITY
Start: 2023-04-17 | End: 1900-01-01

## 2024-04-18 NOTE — HISTORY OF PRESENT ILLNESS
[FreeTextEntry1] : ORIGINAL PRESENTATION: Mr. Irizarry is a 78-year-old male who continues to follow up with us for chronic cervical and lumbar pain radiating into the upper extremities related to a 1995 work injury. He has undergone cervical fusion without significant improvement. Medication management has continued as well as intermittent interventional procedures in the form of trigger point injections as well as cervical epidural procedures. He remains content with his response to medication management in the form of oxycodone IR which is consumed up to three times daily.  He states his pain is of undetermined origin, moderate to severe, constant, in no typical pattern. He states it is pressure-like, dull aching, and throbbing. He denies any weakness in the upper lower extremities. He states that lying down standing sitting walking and exercise increases his pain. Denies any recent change in bowel or bladder habits.  ACTIVITIES: He states he can walk 3 blocks before his pain begins. He can sit and stand for 30 minutes before his pain begins. In a day he sometimes has lie down because of pain. He currently uses no assistive walking device.  PRIOR PAIN TREATMENTS: Surgery by Dr. Henderson in 1996 gave him moderate relief.  PRIOR PAIN MEDICATIONS: Tylenol, cyclobenzaprine, oxycodone, Percocet.   He is currently managed on Oxycodone 10 mg, BID. Patient reports today that he would like to transfer his care to a different Pain Management provider outside of practice.  Of note, patient has repeat inconsistent UDS + for tramadol.   PATIENT PRESENTS FOR FOLLOW UP: Last seen on 03/31/2024 and since then he underwent Right L3,L4,L5 MBB on 04/02/2024 and reports today that he got over 75% of pain relief in the first 2 hours post procedure, and 50% pain relief in the 48 hours post procedure. This is considered diagnostic and we will request Right L3,L4,L5 RFA w/MAC today. He  is under our care for chronic cervical and lumbar pain radiating into the upper and lower extremities related to a 1995 work injury. He previously underwent an L1-2, L3-5 decompressive laminectomy w/ coflex with Dr. Henderson on 12/22/22. Cervical pain has been stable. He then underwent a right SI joint injection on 8/03/23 with no relief. He states that the pain has gotten worse and now he is struggling to put pressure on the right leg as it exacerbates the pain in his right buttock region.   He was previously medically managed on Oxycodone 10 mg BID. He is no longer on the medication. He is continuing with Cyclobenzaprine and Lidocaine patches which provides at least 50% pain relief.

## 2024-04-18 NOTE — DISCUSSION/SUMMARY
[Medication Risks Reviewed] : Medication risks reviewed [de-identified] : Mr. Irizarry is a 78-year-old gentleman who sustained a work-related accident in 1995 affecting his neck and lower back. He previously underwent a right SI joint injection on 8/03/23 with no relief. He underwent Right L3,L4,L5 MBB on 04/02/2024 and reports today that he got over 75% of pain relief in the first 2 hours post procedure, and 50% pain relief in the 48 hours post procedure. This is considered diagnostic and we will request Right L3,L4,L5 RFA w/MAC today.    All this patients questions were answered and the conversation was understood well.  Patient had paravertebral tenderness and pain on spinal movement with facet loading.  Patient has trialed rehab (home exercise, physical therapy or chiropractic care) and medications.  We will schedule Right L3,L4,L5 RFA today.  Risk, benefits, pros and cons of procedure were explained to the patient using models and diagrams and their questions were answered.  The patient has severe anxiety of procedures that necessitates monitored anesthesia care (MAC). The procedure performed will be close to major nerves, arteries, and spinal cord and/or joint structures. Due to the proximity of these structures, we need the patient to be still during the procedure. With the help of MAC, this will be safely achieved and decrease the risk of any complications.  Total clinician time spent today on the patient is 30 minutes including preparing to see the patient, obtaining and/or reviewing and confirming history, performing medically necessary and appropriate examination, counseling and educating the patient and/or family, documenting clinical information in the EHR and communicating and/or referring to other healthcare professionals.  LUZ Holliday M.D., FAAPMR

## 2024-04-18 NOTE — PHYSICAL EXAM
[Normal Coordination] : normal coordination [Normal DTR UE/LE] : normal DTR UE/LE  [Normal Sensation] : normal sensation [Normal Mood and Affect] : normal mood and affect [Oriented] : oriented [Able to Communicate] : able to communicate [Well Developed] : well developed [Well Nourished] : well nourished [de-identified] : Examination of the neck is as follows: \par  Inspection: no scars, no erythema, no ecchymosis, no palpable masses. \par  Palpation (Bilateral): paracervical spasm, paracervical tenderness, but no trapezial spasm, no trapezial tenderness, no rhomboid spasm, no rhomboid tenderness. \par  ROM: diminished ROM in all planes. Pain at extremes of: flexion, extension, rotation to left, rotation to right. Pain radiates to left arm with motion. Numbness radiates to left arm with motion. Pain radiates to right arm with motion. Numbness radiates to right arm with motion. \par  Strength: motor exam is non-focal throughout both upper extremities. \par  \par  Examination of the spine is as follows: \par  Inspection: incisions clean and dry, skin intact, no atrophy, no erythema, no ecchymosis, no palpable masses, no swelling, no sign of infection, no induration. \par  Palpation (Bilateral): lumbar paraspinal spasm, lumbar paraspinal tenderness, sciatic nerve tenderness, numbness/tingling of leg, numbness/tingling of foot, but no thoracic paraspinal spasm. \par  ROM: diminished ROM in all planes. \par  Strength: motor exam is non-focal throughout both lower extremities. \par  Testing (Bilateral): positive sitting straight leg raise. \par  \par  Gait: mildly antalgic,   Does not use an assistive device. [Right] : right hip [] : patient ambulates without assistive device

## 2024-05-03 NOTE — ASSESSMENT
"  Physical Therapy Treatment    Patient Name: Yumi Richard  MRN: [id number]  Today's Date: 5/3/2024  Time Calculation  Start Time: 0904  Stop Time: 0945  Time Calculation (min): 41 min  PT Therapeutic Procedures Time Entry  Manual Therapy Time Entry: 10  Neuromuscular Re-Education Time Entry: 5  Therapeutic Exercise Time Entry: 25,      Current Problem  1. Right patellofemoral syndrome  Follow Up In Physical Therapy            Insurance:  Payor:  EMPLOYEE MEDICAL PLAN / Plan:  EMPLOYEE MEDICAL PLAN TRADITIONAL  / Product Type: *No Product type* /   Number of Treatments Authorized: 8/30          Subjective   General  Reason for Referral: R knee pain  Referred By: Mary Ellen Hale MD  Past Medical History Relevant to Rehab:  (Reviewed pt past medical history - RL)  General Comment: Patient states that she is feeling more irritation than pain in her knee.    Performing HEP?: Yes    Precautions  Precautions  STEADI Fall Risk Score (The score of 4 or more indicates an increased risk of falling): 0  Precautions Comment: None  Pain  Pain Assessment: 0-10  Pain Score: 2  Pain Type: Acute pain  Pain Location: Knee    Objective   TTP R infrapatellar pole    Treatments:    Therapeutic Exercise  Therapeutic Exercise Activity 1: Sportsarc lvl 3 x 6 min  Therapeutic Exercise Activity 2: dynamic: knee hug x 2, quad pull x 2, open/close gait, ankle swipe  Therapeutic Exercise Activity 3: Decline squats 3 x 8 10# Kb  Therapeutic Exercise Activity 4: 12\" partial step up and pause 5\" 3 x 6 ea  Therapeutic Exercise Activity 5: Leg press DL 60# 2 x 10    Balance/Neuromuscular Re-Education  Balance/Neuromuscular Re-Education Activity Performed: Yes  Balance/Neuromuscular Re-Education Activity 1: SL RDL 2 x 10 ea 10# Kb    Manual Therapy  Manual Therapy Activity 1: R PF mobs  Manual Therapy Activity 2: DSTM quad, ITB, andre-patellar      Assessment:  PT Assessment  PT Assessment Results: Decreased strength, Decreased range of motion, " [FreeTextEntry1] : This is a 78 yo M who presents for a final post-operative encounter s/p decompressive laminectomy with coflex at L1-2, L3-5. He has recovered well with regards to his neurogenic claudication complaints. An element of isolated lumbago noted with ambulation. I have encouraged him to continue with at home exercises with a goal of strengthening his core musculature. He will also begin aqua exercises over the coming weeks. He is aware of the burden that his abdominal girth has on his lumbosacral spine. Through improved core strengtening, he will reduce ths symptoms associated with his burden.\par \par Celebrex at 200mg PO BID prn can be trialed as well for additional symptom control.\par \par He can return to our speciality as needed moving forward.\par \par Maritza Colin PA-C\par Chung Henderson MD Impaired balance, Decreased mobility, Pain  Assessment Comment: Patient with reduced tenderness in R patellar tendon compared to prior session. Overall, patient is progressing with LE strength though quadriceps remain weak bilaterally with R>L. Increased shaking on R vs L with static hold though hip hinge showed good contol and reduced contralateral hiking.    Plan:  OP PT Plan  Treatment/Interventions: Dry needling, Education/ Instruction, Electrical stimulation, Manual therapy, Neuromuscular re-education, Self care/ home management, Therapeutic exercises, Taping techniques, Therapeutic activities  PT Plan: Skilled PT (Closed chain, LE strengthening, manual PRN)  PT Frequency: 2 times per week  Duration: 10 weeks  Onset Date: 02/01/24  Number of Treatments Authorized: 8/30  Rehab Potential: Good  Plan of Care Agreement: Patient    Goals:  Active       PT Problem       PT Goal 1       Start:  04/01/24    Expected End:  06/24/24       STG  1) Patient will be able to complete all normal activities with pain no greater than 1 /10 in 6 weeks.  2) Patient will be able to perform proper squatting technique in 6 weeks in order to reduce compression on knee and prevent increased pain with daily tasks.   3) Patient will be independent with HEP in 3 visits to allow for continued improvement in daily tasks at home and in the community.    LTG  1) Patient will improve LEFS to 71/80 in order to allow for greater completion of functional activities at home and in the community in 10 weeks.  2) Patient will have 5-/5 strength in lateral hip stabilizers to prevent any descending compensations required for proper gait mechanics in 10 weeks.  3) Patient will be able to perform >30 seconds of right SLS on multiple surfaces in order to allow for safe ambulation on all levels within the community in 6 weeks.            Patient Stated Goal 1       Start:  04/01/24    Expected End:  06/10/24       Strengthen R leg and lessen the pain               Jose Saleem, PT

## 2024-05-07 RX ORDER — LIDOCAINE 40 MG/G
4 PATCH TOPICAL
Qty: 30 | Refills: 3 | Status: ACTIVE | COMMUNITY
Start: 2024-03-21 | End: 1900-01-01

## 2024-06-10 ENCOUNTER — APPOINTMENT (OUTPATIENT)
Dept: ORTHOPEDIC SURGERY | Facility: CLINIC | Age: 79
End: 2024-06-10
Payer: MEDICARE

## 2024-06-10 PROCEDURE — 20611 DRAIN/INJ JOINT/BURSA W/US: CPT | Mod: RT

## 2024-06-10 NOTE — PROCEDURE
[Large Joint Injection] : Large joint injection [Right] : of the right [Knee] : knee [Alcohol] : alcohol [Euflexxa(20mg)] : 20mg of Euflexxa [#1] : series #1 [Effusion] : effusion [Risks, benefits, alternatives discussed / Verbal consent obtained] : the risks benefits, and alternatives have been discussed, and verbal consent was obtained [All ultrasound images have been permanently captured and stored accordingly in our picture archiving and communication system] : All ultrasound images have been permanently captured and stored accordingly in our picture archiving and communication system [Visualization of the needle and placement of injection was performed without complication] : visualization of the needle and placement of injection was performed without complication [de-identified] : 20 cc [de-identified] : Straw-colored

## 2024-06-10 NOTE — HISTORY OF PRESENT ILLNESS
[de-identified] : The patient is a 78-year-old male here for a reevaluation of right knee osteoarthritis.  We have Euflexxa series injections approved for him.  He states 3 to 4 days ago he stretched out in bed and felt significant pain in his right knee and noticed swelling.

## 2024-06-10 NOTE — IMAGING
[de-identified] : Physical exam right knee: Moderate effusion.  No erythema or ecchymosis.  Full extension, flexion to 90 degrees.  Intact to light touch.

## 2024-06-10 NOTE — DISCUSSION/SUMMARY
[de-identified] : At this point I recommended an arthrocentesis and starting Euflexxa series injections for the right knee.  The patient agreed.  The area was cleansed and prepped in the usual sterile fashion and an 18-gauge needle was inserted into the superolateral aspect of the right knee and 20 cc of straw-colored fluid was removed and 2 cc of Euflexxa was injected into the right knee.  The puncture site was covered with a Band-Aid.  He was placed into an Ace wrap and advised to remain in the Ace wrap until tomorrow.  He tolerated procedure well.  Post knee arthrocentesis he was able to flex the knee to 120 degrees.  This was the first injection Euflexxa for the right knee.  I will see him back in a week for further evaluation.

## 2024-06-17 ENCOUNTER — APPOINTMENT (OUTPATIENT)
Dept: ORTHOPEDIC SURGERY | Facility: CLINIC | Age: 79
End: 2024-06-17

## 2024-06-17 PROCEDURE — 20611 DRAIN/INJ JOINT/BURSA W/US: CPT | Mod: RT

## 2024-06-17 NOTE — PROCEDURE
[Large Joint Injection] : Large joint injection [Right] : of the right [Knee] : knee [Alcohol] : alcohol [Euflexxa(20mg)] : 20mg of Euflexxa [#2] : series #2 [Effusion] : effusion [Risks, benefits, alternatives discussed / Verbal consent obtained] : the risks benefits, and alternatives have been discussed, and verbal consent was obtained [All ultrasound images have been permanently captured and stored accordingly in our picture archiving and communication system] : All ultrasound images have been permanently captured and stored accordingly in our picture archiving and communication system [Visualization of the needle and placement of injection was performed without complication] : visualization of the needle and placement of injection was performed without complication [de-identified] : 20 cc [de-identified] : Straw-colored

## 2024-06-17 NOTE — DISCUSSION/SUMMARY
[de-identified] : Today I recommend a Euflexxa injection for the right knee.  The patient agreed.  The right knee was injected with 2 cc of Euflexxa, procedure note generated.  This was the second injection Euflexxa for the right knee.  I will see him back in a week for further evaluation.

## 2024-06-17 NOTE — IMAGING
[de-identified] : Physical exam right knee: No effusion.  No erythema or ecchymosis.  Full extension, flexion to 90 degrees, range of motion assessed with the patient sitting on the exam table.

## 2024-06-17 NOTE — HISTORY OF PRESENT ILLNESS
[de-identified] : The patient is a 78-year-old male here for a subsequent reevaluation of his right knee.  He has osteoarthritis.  He had his first Euflexxa injection for the right knee 1 week ago.

## 2024-06-24 ENCOUNTER — APPOINTMENT (OUTPATIENT)
Dept: ORTHOPEDIC SURGERY | Facility: CLINIC | Age: 79
End: 2024-06-24

## 2024-06-24 DIAGNOSIS — M17.11 UNILATERAL PRIMARY OSTEOARTHRITIS, RIGHT KNEE: ICD-10-CM

## 2024-06-24 PROCEDURE — 20611 DRAIN/INJ JOINT/BURSA W/US: CPT | Mod: RT

## 2024-06-27 ENCOUNTER — APPOINTMENT (OUTPATIENT)
Dept: UROLOGY | Facility: CLINIC | Age: 79
End: 2024-06-27

## 2024-07-08 NOTE — HISTORY OF PRESENT ILLNESS
[Currently Experiencing ___] :  [unfilled] [Erectile Dysfunction] : Erectile Dysfunction [Fatigue] : fatigue [FreeTextEntry1] : Jose Roberto is a 76-year-old male born July 2, 1945 we have been following for many years for low testosterone for erectile function and low libido.  He had been on Trimix for a long time and finally stopped working and Dr. Canales put in an implant in May 2021.  He tells me that is working fine any woman that he is interested in who is willing he can now have sex with that is great.  The problem is his libido is low.  He understands that as his testosterone drops, when he gets farther out from his pellet insertion his libido drops when he gets the pellets initially his libido is acceptable.  He had blood drawn on November 8, 2021 he is here to check the results and see if he is due for his next dose.  His last dose of Testopel had been on July 15 so it was about 17 weeks before the blood drawing. no

## 2024-07-29 NOTE — ASU PREOP CHECKLIST - ISOLATION PRECAUTIONS
7/29/2024         RE: Dorita Stringer  [address]  [place] [address]        Dear Colleague,    Thank you for referring your patient, Dorita Stringer, to the [facility]. Please see a copy of my visit note below.    Diabetes Self-Management Education & Support    Presents for: Individual review    Type of Service: In Person Visit      ASSESSMENT:  1 is a very pleasant 70-year-old who returns to clinic today to review blood glucose, medications, and to assess/assist her with her current diabetes self-management skills with an A1c not meeting ADA goal.  She arrived today unaccompanied and had her logbook with her.  Medications were reviewed and she reported she had not yet started or is taking the Januvia that have been prescribed by her by Dr. Bauer at their visit on 7/ 1 due to cost.  Stated she was quoted $1700 which is not financially feasible for her.  She unfortunately, did not contract [facility] as she had agreed to do at our last visit to inquire about her co-pays for GLP-1 agonists and SGLT2's that I provided her a list for.  After reviewing her most recent blood glucose data and with her latest A1c results, I informed her that it is relatively clear that she is unable to successfully manage her diabetes through diet and activity alone.  I went on to remind her that diabetes it is a chronic and its progressive disease and like so many progressive diseases, even if we do everything as we should, adjunct therapy (medications) are often needed.  She asked if maybe we could reconsider trying metformin again, to which I agreed we could.  We will start her on a low-dose of extended release at dinner and see how she does.  I also informed her that even if she is able to tolerate the metformin, this will only help manage her prandial glucose-not her postprandial glucose.  She will most likely need an additional medication for these and I asked  "her to again contact [facility] and inquire about SGLT2's or GLP-1's.  And also inform me today she recently met with an allergist who did extensive testing on her and informed her they found allergies to trees, grass and house dust and also diagnosed her with \"oral allergy syndrome.\"  She told me that there are pollens in the air released from trees and grasses that react in her system to certain foods (fruits and vegetables).  They offered her injections to help with this and she is still considering this.    Patient's most recent   Lab Results   Component Value Date    A1C 8.1 07/01/2024     is not meeting goal of  7-7.5%    Diabetes knowledge and skills assessment:   Patient is knowledgeable in diabetes management concepts related to: Healthy Eating, Being Active, and Monitoring    Continue education with the following diabetes management concepts: Healthy Eating, Problem Solving, Reducing Risks, and Healthy Coping    Based on learning assessment above, most appropriate setting for further diabetes education would be: Individual setting.      PLAN    See Patient Instructions for co-developed, patient-stated behavior change goals.  AVS printed and provided to patient today. See Follow-Up section for recommended follow-up.       Topics to cover at upcoming visits: Healthy Eating, Being Active, Taking Medication, Problem Solving, Reducing Risks, and Healthy Coping    Follow-up: 10/14/24    See Care Plan for co-developed, patient-state behavior change goals.  AVS provided for patient today.    Education Materials Provided:  No new materials provided today      SUBJECTIVE/OBJECTIVE:  Presents for: Individual review  Accompanied by: Self  Diabetes education in the past 24 mo: Yes (LV 7/1/24)  Focus of Visit: Monitoring, Reducing Risks, Taking Medication, Healthy Coping, Healthy Eating, Assistance w/ making life changes, Self-care behavioral goal setting, Being Active  Diabetes type: Type 2  Date of " "diagnosis: 2022  Disease course: Getting harder to manage  Transportation concerns: No  Difficulty affording diabetes medication?: Sometimes  Other concerns:: None (TBI in 2016)  Cultural Influences/Ethnic Background:  Not  or       Diabetes Symptoms & Complications:  Weight trend: Stable  Symptom course: Stable  Disease course: Getting harder to manage  Complications assessed today?: No    Patient Problem List and Family Medical History reviewed for relevant medical history, current medical status, and diabetes risk factors.    Vitals:  Wt 92.1 kg (203 lb 1.6 oz)   LMP  (LMP Unknown)   BMI 29.14 kg/m    Estimated body mass index is 29.14 kg/m  as calculated from the following:    Height as of 7/17/24: 1.778 m (5' 10\").    Weight as of this encounter: 92.1 kg (203 lb 1.6 oz).   Last 3 BP:   BP Readings from Last 3 Encounters:   07/17/24 124/78   03/27/24 139/77   03/22/24 136/82       History   Smoking Status     Some Days     Packs/day: 0.10     Years: 20.00     Types: Cigarettes   Smokeless Tobacco     Never       Labs:  Lab Results   Component Value Date    A1C 8.1 07/01/2024     Lab Results   Component Value Date     01/03/2024     06/03/2022     Lab Results   Component Value Date    LDL  07/17/2024      Comment:      Cannot estimate LDL when triglyceride exceeds 400 mg/dL     07/17/2024     06/03/2022     Direct Measure HDL   Date Value Ref Range Status   07/17/2024 47 (L) >=50 mg/dL Final   ]  GFR Estimate   Date Value Ref Range Status   01/03/2024 73 >60 mL/min/1.73m2 Final   07/16/2020 >60 >60 mL/min/1.73m2 Final     GFR Estimate If Black   Date Value Ref Range Status   07/16/2020 >60 >60 mL/min/1.73m2 Final     Lab Results   Component Value Date    CR 0.86 01/03/2024     No results found for: \"MICROALBUMIN\"    Healthy Eating:  Healthy Eating Assessed Today: Yes  Cultural/Quaker diet restrictions?: No  Do you have any food allergies or intolerances?: Yes  Please " list your food allergies / intolerances: blueberry, hazelnut, celery, tomatoes, legumes - recently dx with oral allergy syndrome ( pollens in air react with certain foods)  Meal planning/habits: Smaller portions, Avoiding sweets, Low carb  Who cooks/prepares meals for you?: Self, Other  Who purchases food in  your home?: Self  How many times a week on average do you eat food made away from home (restaurant/take-out)?: 3  Meals include: Breakfast, Dinner, Afternoon Snack  Breakfast:  will have coffee with crustless quiche or hard boiled eggs  Lunch: frequently will go out to lunch with her sister where they will always split a meal- if home might be soup or a sandwich  Dinner: last night was bratwurst on  wheat bun, heidi slaw    usually + protein and veg or salad  Snacks: during day - fruit: berries, grapes, cherries, peaches  evenings  c cottage cheese and 2 slices cheese or nuts  Other: no white breads, little to no sweets  Beverages: Water, Coffee, Milk, Soda, Other (see Comments)  Please elaborate::  emeterio lemonade ( 5-8 CHO)  Has patient met with a dietitian in the past?: No    Being Active:  Being Active Assessed Today: Yes  Exercise:: Yes  Days per week of moderate to strenuous exercise (like a brisk walk): 6  On average, minutes per day of exercise at this level: 30  How intense was your typical exercise? : Light (like stretching or slow walking)  Exercise Minutes per Week: 180  Barrier to exercise: None    Monitoring:  Monitoring Assessed Today: Yes  Did patient bring glucose meter to appointment? : No (logbook)  Blood Glucose Meter: Accu-chek  Times checking blood sugar at home (number): 1  Times checking blood sugar at home (per): Day  Blood glucose trend: No change    Following are Dorita's most recent blood glucose readings taken from her logbook today.    FB, 158, 189, 162, 182, 171, 183, 181  2-hour post dinner: 257, 201, 215, 213, 208, 206, 206, 212    Taking  Medications:  Diabetes Medication(s)       Biguanides       metFORMIN (GLUCOPHAGE XR) 500 MG 24 hr tablet Take 1 tablet (500 mg) by mouth daily (with dinner)       Dipeptidyl Peptidase-4 (DPP-4) Inhibitors       sitagliptin (JANUVIA) 100 MG tablet Take 1 tablet (100 mg) by mouth daily     Patient not taking: Reported on 7/29/2024            Taking Medication Assessed Today: Yes  Current Treatments: Diet    Problem Solving:  Problem Solving Assessed Today: Yes  Is the patient at risk for hypoglycemia?: No  Is the patient at risk for DKA?: No  Does patient have severe weather/disaster plan for diabetes management?: Not Needed  Does patient have sick day plan for diabetes management?: Not Needed              Reducing Risks:  Reducing Risks Assessed Today: Yes  Diabetes Risks: Age over 45 years, Sedentary Lifestyle  CAD Risks: Post-menopausal, Sedentary lifestyle, Hypertension, Diabetes Mellitus, Family history  Has dilated eye exam at least once a year?: Yes  Sees dentist every 6 months?: Yes  Feet checked by healthcare provider in the last year?: Yes    Healthy Coping:  Healthy Coping Assessed Today: Yes  Emotional response to diabetes: Ready to learn  Informal Support system:: Family  Stage of change: ACTION (Actively working towards change)  Support resources: In-person Offerings  Patient Activation Measure Survey Score:       No data to display                  Care Plan and Education Provided:  Healthy Eating: Balanced meals, Consistency in amount and timing of carbohydrate intake, Eating out, Label reading, and Portion control, Being Active: Amount recommended (150 minutes moderate or 75 minutes vigorous activity and 2-3 days strength training per week), Finding a physical activity routine that works for you, and Relationship of activity to glucose, Monitoring: Frequency of monitoring and Individual glucose targets, Taking Medication: Action of prescribed medication(s), Problem Solving: High glucose - causes,  none signs/symptoms, treatment and prevention, Reducing Risks: Eye care, Goal for A1c, how it relates to glucose and how often to check, Prevention, early diagnostic measures and treatment of complications, and A1C - goals, relating to blood glucose levels, how often to check, and Healthy Coping: Benefits of making appropriate lifestyle changes, Identifying helpful resources, Recognize feelings about diagnosis, and Utilize support systems    Thank you,  Sandra Hill RN [facility][note 3]  Certified Diabetes Care and   Visit type : DSMT      Time Spent: 60 minutes  Encounter Type: Individual    Any diabetes medication dose changes were made via the CDE Protocol per the patient's referring provider and primary care provider. A copy of this encounter was shared with the provider.   Much or all of the text in this note was generated through the use of the Dragon Dictate voice-to-text software.Errors in spelling or words which seem out of context are unintentional. Sound alike errors, in particular, may have escaped editing.

## 2024-08-13 NOTE — ASU DISCHARGE PLAN (ADULT/PEDIATRIC) - DRESSING DRY FT
Joel Harley is a 29 year old male here presenting with:    Reported symptoms: pt here requesting STD testing. States a partner he was with over a year ago just tested positive for chlamydia. Pt denies any symptoms.    OTC medications/Home remedy: none    Patient would like communication of their results via:      Cell Phone:   Telephone Information:   Mobile [phone]     Okay to leave a message containing results? Yes             1

## 2024-09-23 LAB
ESTRADIOL SERPL-MCNC: 19 PG/ML
SHBG SERPL-SCNC: 84.5 NMOL/L

## 2024-09-24 NOTE — ASU DISCHARGE PLAN (ADULT/PEDIATRIC). - DISCHARGE DATE
27-Mar-2018 08:15 Hpi Title: Evaluation of Skin Lesions How Severe Are Your Spot(S)?: mild Have Your Spot(S) Been Treated In The Past?: has not been treated

## 2024-09-25 LAB
TESTOSTERONE FREE/WEAKLY BND: 17.2 NG/DL
TESTOSTERONE TOTAL S: 351 NG/DL
TESTOSTERONE, % FREE/WEAKLY BND: 4.9 %

## 2024-10-08 ENCOUNTER — APPOINTMENT (OUTPATIENT)
Dept: UROLOGY | Facility: CLINIC | Age: 79
End: 2024-10-08
Payer: MEDICARE

## 2024-10-08 VITALS
WEIGHT: 208 LBS | BODY MASS INDEX: 29.78 KG/M2 | DIASTOLIC BLOOD PRESSURE: 87 MMHG | HEIGHT: 70 IN | HEART RATE: 89 BPM | TEMPERATURE: 98.3 F | SYSTOLIC BLOOD PRESSURE: 149 MMHG

## 2024-10-08 DIAGNOSIS — R53.82 CHRONIC FATIGUE, UNSPECIFIED: ICD-10-CM

## 2024-10-08 DIAGNOSIS — R68.82 DECREASED LIBIDO: ICD-10-CM

## 2024-10-08 DIAGNOSIS — R79.89 OTHER SPECIFIED ABNORMAL FINDINGS OF BLOOD CHEMISTRY: ICD-10-CM

## 2024-10-08 PROCEDURE — 99214 OFFICE O/P EST MOD 30 MIN: CPT | Mod: 25

## 2024-10-08 PROCEDURE — 11980 IMPLANT HORMONE PELLET(S): CPT

## 2024-10-28 ENCOUNTER — APPOINTMENT (OUTPATIENT)
Dept: ORTHOPEDIC SURGERY | Facility: CLINIC | Age: 79
End: 2024-10-28

## 2024-11-19 ENCOUNTER — EMERGENCY (EMERGENCY)
Facility: HOSPITAL | Age: 79
LOS: 0 days | Discharge: HOME | End: 2024-11-19
Attending: EMERGENCY MEDICINE
Payer: MEDICARE

## 2024-11-19 VITALS
RESPIRATION RATE: 18 BRPM | HEIGHT: 70 IN | TEMPERATURE: 98 F | OXYGEN SATURATION: 96 % | SYSTOLIC BLOOD PRESSURE: 137 MMHG | DIASTOLIC BLOOD PRESSURE: 88 MMHG | HEART RATE: 85 BPM | WEIGHT: 203.05 LBS

## 2024-11-19 DIAGNOSIS — Z95.5 PRESENCE OF CORONARY ANGIOPLASTY IMPLANT AND GRAFT: ICD-10-CM

## 2024-11-19 DIAGNOSIS — I10 ESSENTIAL (PRIMARY) HYPERTENSION: ICD-10-CM

## 2024-11-19 DIAGNOSIS — Z87.891 PERSONAL HISTORY OF NICOTINE DEPENDENCE: ICD-10-CM

## 2024-11-19 DIAGNOSIS — Z90.89 ACQUIRED ABSENCE OF OTHER ORGANS: Chronic | ICD-10-CM

## 2024-11-19 DIAGNOSIS — E78.5 HYPERLIPIDEMIA, UNSPECIFIED: ICD-10-CM

## 2024-11-19 DIAGNOSIS — H72.91 UNSPECIFIED PERFORATION OF TYMPANIC MEMBRANE, RIGHT EAR: ICD-10-CM

## 2024-11-19 DIAGNOSIS — H92.01 OTALGIA, RIGHT EAR: ICD-10-CM

## 2024-11-19 DIAGNOSIS — Z95.810 PRESENCE OF AUTOMATIC (IMPLANTABLE) CARDIAC DEFIBRILLATOR: Chronic | ICD-10-CM

## 2024-11-19 DIAGNOSIS — I25.10 ATHEROSCLEROTIC HEART DISEASE OF NATIVE CORONARY ARTERY WITHOUT ANGINA PECTORIS: ICD-10-CM

## 2024-11-19 DIAGNOSIS — Z95.810 PRESENCE OF AUTOMATIC (IMPLANTABLE) CARDIAC DEFIBRILLATOR: ICD-10-CM

## 2024-11-19 DIAGNOSIS — Z88.5 ALLERGY STATUS TO NARCOTIC AGENT: ICD-10-CM

## 2024-11-19 DIAGNOSIS — I25.10 ATHEROSCLEROTIC HEART DISEASE OF NATIVE CORONARY ARTERY WITHOUT ANGINA PECTORIS: Chronic | ICD-10-CM

## 2024-11-19 LAB
ALBUMIN SERPL ELPH-MCNC: 3.4 G/DL — LOW (ref 3.5–5.2)
ALP SERPL-CCNC: 97 U/L — SIGNIFICANT CHANGE UP (ref 30–115)
ALT FLD-CCNC: 21 U/L — SIGNIFICANT CHANGE UP (ref 0–41)
ANION GAP SERPL CALC-SCNC: 10 MMOL/L — SIGNIFICANT CHANGE UP (ref 7–14)
AST SERPL-CCNC: 38 U/L — SIGNIFICANT CHANGE UP (ref 0–41)
BASOPHILS # BLD AUTO: 0.05 K/UL — SIGNIFICANT CHANGE UP (ref 0–0.2)
BASOPHILS NFR BLD AUTO: 0.8 % — SIGNIFICANT CHANGE UP (ref 0–1)
BILIRUB SERPL-MCNC: 0.4 MG/DL — SIGNIFICANT CHANGE UP (ref 0.2–1.2)
BUN SERPL-MCNC: 10 MG/DL — SIGNIFICANT CHANGE UP (ref 10–20)
CALCIUM SERPL-MCNC: 9 MG/DL — SIGNIFICANT CHANGE UP (ref 8.4–10.5)
CHLORIDE SERPL-SCNC: 103 MMOL/L — SIGNIFICANT CHANGE UP (ref 98–110)
CO2 SERPL-SCNC: 25 MMOL/L — SIGNIFICANT CHANGE UP (ref 17–32)
CREAT SERPL-MCNC: 0.7 MG/DL — SIGNIFICANT CHANGE UP (ref 0.7–1.5)
EGFR: 94 ML/MIN/1.73M2 — SIGNIFICANT CHANGE UP
EOSINOPHIL # BLD AUTO: 0.12 K/UL — SIGNIFICANT CHANGE UP (ref 0–0.7)
EOSINOPHIL NFR BLD AUTO: 1.8 % — SIGNIFICANT CHANGE UP (ref 0–8)
GLUCOSE SERPL-MCNC: 126 MG/DL — HIGH (ref 70–99)
HCT VFR BLD CALC: 38.8 % — LOW (ref 42–52)
HGB BLD-MCNC: 13.1 G/DL — LOW (ref 14–18)
IMM GRANULOCYTES NFR BLD AUTO: 0.8 % — HIGH (ref 0.1–0.3)
LACTATE SERPL-SCNC: 1.6 MMOL/L — SIGNIFICANT CHANGE UP (ref 0.7–2)
LYMPHOCYTES # BLD AUTO: 0.48 K/UL — LOW (ref 1.2–3.4)
LYMPHOCYTES # BLD AUTO: 7.3 % — LOW (ref 20.5–51.1)
MCHC RBC-ENTMCNC: 33 PG — HIGH (ref 27–31)
MCHC RBC-ENTMCNC: 33.8 G/DL — SIGNIFICANT CHANGE UP (ref 32–37)
MCV RBC AUTO: 97.7 FL — HIGH (ref 80–94)
MONOCYTES # BLD AUTO: 0.63 K/UL — HIGH (ref 0.1–0.6)
MONOCYTES NFR BLD AUTO: 9.6 % — HIGH (ref 1.7–9.3)
NEUTROPHILS # BLD AUTO: 5.24 K/UL — SIGNIFICANT CHANGE UP (ref 1.4–6.5)
NEUTROPHILS NFR BLD AUTO: 79.7 % — HIGH (ref 42.2–75.2)
NRBC # BLD: 0 /100 WBCS — SIGNIFICANT CHANGE UP (ref 0–0)
PLATELET # BLD AUTO: 208 K/UL — SIGNIFICANT CHANGE UP (ref 130–400)
PMV BLD: 11.1 FL — HIGH (ref 7.4–10.4)
POTASSIUM SERPL-MCNC: 5.6 MMOL/L — HIGH (ref 3.5–5)
POTASSIUM SERPL-SCNC: 5.6 MMOL/L — HIGH (ref 3.5–5)
PROT SERPL-MCNC: 6.1 G/DL — SIGNIFICANT CHANGE UP (ref 6–8)
RBC # BLD: 3.97 M/UL — LOW (ref 4.7–6.1)
RBC # FLD: 13.5 % — SIGNIFICANT CHANGE UP (ref 11.5–14.5)
SODIUM SERPL-SCNC: 138 MMOL/L — SIGNIFICANT CHANGE UP (ref 135–146)
WBC # BLD: 6.57 K/UL — SIGNIFICANT CHANGE UP (ref 4.8–10.8)
WBC # FLD AUTO: 6.57 K/UL — SIGNIFICANT CHANGE UP (ref 4.8–10.8)

## 2024-11-19 PROCEDURE — 70450 CT HEAD/BRAIN W/O DYE: CPT | Mod: 26,MC

## 2024-11-19 PROCEDURE — 70450 CT HEAD/BRAIN W/O DYE: CPT | Mod: MC

## 2024-11-19 PROCEDURE — 36415 COLL VENOUS BLD VENIPUNCTURE: CPT

## 2024-11-19 PROCEDURE — 99284 EMERGENCY DEPT VISIT MOD MDM: CPT | Mod: 25

## 2024-11-19 PROCEDURE — 99285 EMERGENCY DEPT VISIT HI MDM: CPT

## 2024-11-19 PROCEDURE — 83605 ASSAY OF LACTIC ACID: CPT

## 2024-11-19 PROCEDURE — 85025 COMPLETE CBC W/AUTO DIFF WBC: CPT

## 2024-11-19 PROCEDURE — 80053 COMPREHEN METABOLIC PANEL: CPT

## 2024-11-19 PROCEDURE — 70481 CT ORBIT/EAR/FOSSA W/DYE: CPT | Mod: MC

## 2024-11-19 PROCEDURE — 70481 CT ORBIT/EAR/FOSSA W/DYE: CPT | Mod: 26,XU,MC

## 2024-11-19 PROCEDURE — 96374 THER/PROPH/DIAG INJ IV PUSH: CPT | Mod: XU

## 2024-11-19 RX ORDER — OXYCODONE AND ACETAMINOPHEN 7.5; 325 MG/1; MG/1
1 TABLET ORAL
Qty: 12 | Refills: 0
Start: 2024-11-19 | End: 2024-11-21

## 2024-11-19 RX ORDER — KETOROLAC TROMETHAMINE 30 MG/ML
15 INJECTION INTRAMUSCULAR; INTRAVENOUS ONCE
Refills: 0 | Status: DISCONTINUED | OUTPATIENT
Start: 2024-11-19 | End: 2024-11-19

## 2024-11-19 RX ORDER — OFLOXACIN OTIC 3 MG/ML
10 SOLUTION AURICULAR (OTIC)
Qty: 1 | Refills: 0
Start: 2024-11-19 | End: 2024-11-25

## 2024-11-19 RX ADMIN — KETOROLAC TROMETHAMINE 15 MILLIGRAM(S): 30 INJECTION INTRAMUSCULAR; INTRAVENOUS at 05:02

## 2024-11-19 NOTE — ED ADULT NURSE NOTE - CHIEF COMPLAINT QUOTE
Pt states c/o right ear pain, w/ drainage and hearing loss.  3 days ago he fell asleep on his cell phone, he heard a crack realized his phone fell on the floor reached to get it and felt a "pull or crack" In the back of  his right ear, w/ some blood noted at first.  pt states he hasn't been able to sleep since.  pt denies any injury to ear or head strike    pt has hx ca in his nose

## 2024-11-19 NOTE — ED PROVIDER NOTE - PROGRESS NOTE DETAILS
FF: ent consulted recommend pt be discharged with ofloxacillin drops. FF: ent consulted recommend pt be discharged with ofloxacillin drops. pt advised of strict return precautions discussed at bedside. agreeable to dc. f/u with pcp.

## 2024-11-19 NOTE — ED PROVIDER NOTE - NSPTACCESSSVCSAPPTDETAILS_ED_ALL_ED_FT
pt has seen ent dr. black in the past has ruptured right tm, needs ent for follow up saw ent in the ed.

## 2024-11-19 NOTE — CONSULT NOTE ADULT - SUBJECTIVE AND OBJECTIVE BOX
ENT: Pt is a 78y/o M presenting with three day history of RIGHT ear pain since after bending down to  an object. Denies bleeding from ear/purulence. 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     PAST MEDICAL & SURGICAL HISTORY:  HTN (hypertension)  High cholesterol  Myocardial infarct  10 years ago  GERD (gastroesophageal reflux disease)  AICD (automatic cardioverter/defibrillator) present  Erectile dysfunction  Back pain  AICD (automatic cardioverter/defibrillator) present  CAD (coronary artery disease)  3 stents  H/O uvulectomy  S/P T&A (status post tonsillectomy and adenoidectomy)    Allergies  oxycodone (Pruritus)    REVIEW OF SYSTEMS   [x] A ten-point review of systems was otherwise negative except as noted.    Vital Signs Last 24 Hrs  T(C): 36.6 (19 Nov 2024 04:10), Max: 36.6 (19 Nov 2024 04:10)  T(F): 97.8 (19 Nov 2024 04:10), Max: 97.8 (19 Nov 2024 04:10)  HR: 85 (19 Nov 2024 04:10) (85 - 85)  BP: 137/88 (19 Nov 2024 04:10) (137/88 - 137/88)  RR: 18 (19 Nov 2024 04:10) (18 - 18)  SpO2: 96% (19 Nov 2024 04:10) (96% - 96%)    GEN: NAD, awake and alert. No drooling or pooling of secretions. No stridor or stertor. Good vocal quality, no hoarseness.   SKIN: Good color, non diaphoretic.  HEENT: RIGHT EAC with clear fluid, no bleeding/purulence noted. No mastoid tenderness/erythema.   NECK: Trachea midline  RESP: No dyspnea, non-labored breathing. No use of accessory muscles.   CARDIO: +S1/S2  ABDO: Soft, NT.  EXT: BOLDEN x 4    LABS:             13.1   6.57  )-----------( 208      ( 19 Nov 2024 04:57 )             38.8     138  |  103  |  10  ----------------------------<  126[H]  5.6[H]   |  25  |  0.7    Ca    9.0      19 Nov 2024 04:57  TPro  6.1  /  Alb  3.4[L]  /  TBili  0.4  /  DBili  x   /  AST  38  /  ALT  21  /  AlkPhos  97  11-19    RADIOLOGY & ADDITIONAL STUDIES:  ACC: [id number] EXAM:  CT TEMPORAL BONES IC   ORDERED BY: JOHN FAROOQ     PROCEDURE DATE:  11/19/2024    INTERPRETATION:  CT EXAMINATION OF THE TEMPORAL BONES  CLINICAL INDICATION: Right ear pain and otorrhea  TECHNIQUE: Multidetector axialCT images of the temporal bones were   obtained without intravenous contrast.  COMPARISON: None    FINDINGS:  RIGHT:  External auditory canal: Soft tissue lines the canal and there is debris   and thickening of the tympanic membrane. Small osteoma associated with   the superior right external auditory canal.  Mastoid air cells: Opacification of the abscess at antrum and mastoid air   cells. Few residual aerated air cells. Evidence for coalescence, likely   chronic of the cortical mastoid air cells. Inner and outer cortices   intact.  Middle ear: Small amount of soft tissue within the lateral epitympanic   (Prussak's) space without clear erosions. Peristapedial debris.  Inner ear: The otic capsule and inner ear structures appear intact. The   vestibular aqueduct is normal in size.  Facial nerve canal: Normal course and caliber.  Vascular structures:  The sigmoid plate is intact.  The carotid canal is   covered by bone. The jugular vein and the internal carotid artery are in   their expected locations.  Internal auditory canal: Normal in size    LEFT:  Mastoid air cells: The mastoid air cells are well developed and clear.   The tegmen mastoideum is intact.  Outer ear: Small amount soft tissue in the external auditory canal small   amount of soft tissue, likely cerumen in the external auditory canal.   Middle ear: The middle ear cavity is clear. The ossicles and scutum are   intact without erosion. The tegmen tympani is intact.  Inner ear: The otic capsule and inner ear structures appear intact. The   vestibular aqueduct is normal in size.  Facial nerve canal: Normal course and caliber.  Vascular structures:  The sigmoid plate is intact.  The carotid canal is   covered by bone.  Internal auditory canal: Normal in size  Prominence of the cavernous carotid arteries bilaterally is noted not   evaluated fully on this study the temporal bones.    IMPRESSION:  1.  Right temporal bones: Extensive soft tissue in the external auditory   canal. Small osteoma, equivocal erosive changes. Suggesting chronicity  2.  Thickening of the tympanic membrane, small amount of soft tissue in   the epitympanic (Prussak's) space and perirenal stapedial debris without   erosion  3.  Near complete tympanomastoid opacification. Confluent caudal mastoid   air cells suggests coalescence, likely chronic  4.  Scattered scant fluid levels. Correlate for acute otomastoiditis  5.  Left side unremarkable    JONNATHAN SALAZAR MD; Resident Radiologist  This document has been electronically signed.  DUANE CASTANEDA MD; Attending Interventional Radiologist  This document has been electronically signed. Nov 19 2024  6:17AM

## 2024-11-19 NOTE — ED PROVIDER NOTE - CLINICAL SUMMARY MEDICAL DECISION MAKING FREE TEXT BOX
Labs, EKG and imaging were ordered, where indicated.  Independent interpretation of any labs, EKG & imaging that was ordered was performed by me, Dr. Michel. Appropriate medications for patient's presenting complaints were ordered and effects were reassessed, where indicated.  Patient's records (prior hospital, ED visit, and/or nursing home note) were reviewed, if available.  Additional history was obtained from EMS, family, and/or PCP (where available).  Escalation to admission/observation was considered.  However patient feels much better and patient/parent is comfortable with discharge.  Appropriate follow-up was arranged.     R ear pain 2/2 perf TM - ent consulted rec otic abx ggt & op f/u

## 2024-11-19 NOTE — ED PROVIDER NOTE - NSFOLLOWUPINSTRUCTIONS_ED_ALL_ED_FT
A ruptured eardrum — or tympanic membrane perforation as it's medically known — is a hole or tear in the thin tissue that separates your ear canal from your middle ear (eardrum).    A ruptured eardrum can result in hearing loss. A ruptured eardrum can also make your middle ear vulnerable to infections or injury.    A ruptured eardrum usually heals within a few weeks without treatment. Sometimes, however, a ruptured eardrum requires a procedure or surgical repair to heal.    Symptoms  Signs and symptoms of a ruptured eardrum may include:    Ear pain that may subside quickly  Clear, pus-filled or bloody drainage from your ear  Hearing loss  Ringing in your ear (tinnitus)  Spinning sensation (vertigo)  Nausea or vomiting that can result from vertigo  When to see a doctor  Call your doctor if you experience any of the signs or symptoms of a ruptured eardrum or pain or discomfort in your ears. Your middle and inner ears are composed of delicate mechanisms that are sensitive to injury or disease. Prompt and appropriate treatment is important to preserve your hearing.    Causes  Inside of your ear   Middle ear  Causes of a ruptured, or perforated, eardrum may include:    Middle ear infection (otitis media). A middle ear infection often results in the accumulation of fluids in your middle ear. Pressure from these fluids can cause the eardrum to rupture.  Barotrauma. Barotrauma is stress exerted on your eardrum when the air pressure in your middle ear and the air pressure in the environment are out of balance. If the pressure is severe, your eardrum can rupture. Barotrauma is most often caused by air pressure changes associated with air travel.    Other events that can cause sudden changes in pressure — and possibly a ruptured eardrum — include scuba diving and a direct blow to the ear, such as the impact of an automobile air bag.    Loud sounds or blasts (acoustic trauma). A loud sound or blast, as from an explosion or gunshot — essentially an overpowering sound wave — can cause a tear in your eardrum.  Foreign objects in your ear. Small objects, such as a cotton swab or hairpin, can puncture or tear the eardrum.  Severe head trauma. Severe injury, such as skull fracture, may cause the dislocation or damage to middle and inner ear structures, including your eardrum.  Complications  Your eardrum (tympanic membrane) has two primary roles:    Hearing. When sound waves strike it, your eardrum vibrates — the first step by which structures of your middle and inner ears translate sound waves into nerve impulses.  Protection. Your eardrum also acts as a barrier, protecting your middle ear from water, bacteria and other foreign substances.  If your eardrum ruptures, complications can occur while your eardrum is healing or if it fails to heal. Possible complications include:    Hearing loss. Usually, hearing loss is temporary, lasting only until the tear or hole in your eardrum has healed. The size and location of the tear can affect the degree of hearing loss.  Middle ear infection (otitis media). A perforated eardrum can allow bacteria to enter your ear. If a perforated eardrum doesn't heal or isn't repaired, you may be vulnerable to ongoing (chronic) infections that can cause permanent hearing loss.  Middle ear cyst (cholesteatoma). A cholesteatoma is a cyst in your middle ear composed of skin cells and other debris.    Ear canal debris normally travels to your outer ear with the help of ear-protecting earwax. If your eardrum is ruptured, the skin debris can pass into your middle ear and form a cyst.    A cholesteatoma provides a friendly environment for bacteria and contains proteins that can damage bones of your middle ear.    Prevention  Follow these tips to avoid a ruptured or perforated eardrum:    Get treatment for middle ear infections. Be aware of the signs and symptoms of middle ear infection, including earache, fever, nasal congestion and reduced hearing. Children with a middle ear infection often rub or pull on their ears. Seek prompt evaluation from your primary care doctor to prevent potential damage to the eardrum.  Protect your ears during flight. If possible, don't fly if you have a cold or an active allergy that causes nasal or ear congestion. During takeoffs and landings, keep your ears clear with pressure-equalizing earplugs, yawning or chewing gum. Or use the Valsalva maneuver — gently blowing, as if blowing your nose, while pinching your nostrils and keeping your mouth closed. Don't sleep during ascents and descents.  Keep your ears free of foreign objects. Never attempt to dig out excess or hardened earwax with items such as a cotton swab, paper clip or hairpin. These items can easily tear or puncture your eardrum. Teach your children about the damage that can be done by putting foreign objects in their ears.  Guard against excessive noise. Protect your ears from unnecessary damage by wearing protective earplugs or earmuffs in your workplace or during recreational activities if loud noise is present. Our Emergency Department Referral Coordinators will be reaching out to you in the next 24-48 hours from 9:00am to 5:00pm to schedule a follow up appointment. Please expect a phone call from the hospital in that time frame. If you do not receive a call or if you have any questions or concerns, you can reach them at   [phone].      A ruptured eardrum — or tympanic membrane perforation as it's medically known — is a hole or tear in the thin tissue that separates your ear canal from your middle ear (eardrum).    A ruptured eardrum can result in hearing loss. A ruptured eardrum can also make your middle ear vulnerable to infections or injury.    A ruptured eardrum usually heals within a few weeks without treatment. Sometimes, however, a ruptured eardrum requires a procedure or surgical repair to heal.    Symptoms  Signs and symptoms of a ruptured eardrum may include:    Ear pain that may subside quickly  Clear, pus-filled or bloody drainage from your ear  Hearing loss  Ringing in your ear (tinnitus)  Spinning sensation (vertigo)  Nausea or vomiting that can result from vertigo  When to see a doctor  Call your doctor if you experience any of the signs or symptoms of a ruptured eardrum or pain or discomfort in your ears. Your middle and inner ears are composed of delicate mechanisms that are sensitive to injury or disease. Prompt and appropriate treatment is important to preserve your hearing.    Causes  Inside of your ear   Middle ear  Causes of a ruptured, or perforated, eardrum may include:    Middle ear infection (otitis media). A middle ear infection often results in the accumulation of fluids in your middle ear. Pressure from these fluids can cause the eardrum to rupture.  Barotrauma. Barotrauma is stress exerted on your eardrum when the air pressure in your middle ear and the air pressure in the environment are out of balance. If the pressure is severe, your eardrum can rupture. Barotrauma is most often caused by air pressure changes associated with air travel.    Other events that can cause sudden changes in pressure — and possibly a ruptured eardrum — include scuba diving and a direct blow to the ear, such as the impact of an automobile air bag.    Loud sounds or blasts (acoustic trauma). A loud sound or blast, as from an explosion or gunshot — essentially an overpowering sound wave — can cause a tear in your eardrum.  Foreign objects in your ear. Small objects, such as a cotton swab or hairpin, can puncture or tear the eardrum.  Severe head trauma. Severe injury, such as skull fracture, may cause the dislocation or damage to middle and inner ear structures, including your eardrum.  Complications  Your eardrum (tympanic membrane) has two primary roles:    Hearing. When sound waves strike it, your eardrum vibrates — the first step by which structures of your middle and inner ears translate sound waves into nerve impulses.  Protection. Your eardrum also acts as a barrier, protecting your middle ear from water, bacteria and other foreign substances.  If your eardrum ruptures, complications can occur while your eardrum is healing or if it fails to heal. Possible complications include:    Hearing loss. Usually, hearing loss is temporary, lasting only until the tear or hole in your eardrum has healed. The size and location of the tear can affect the degree of hearing loss.  Middle ear infection (otitis media). A perforated eardrum can allow bacteria to enter your ear. If a perforated eardrum doesn't heal or isn't repaired, you may be vulnerable to ongoing (chronic) infections that can cause permanent hearing loss.  Middle ear cyst (cholesteatoma). A cholesteatoma is a cyst in your middle ear composed of skin cells and other debris.    Ear canal debris normally travels to your outer ear with the help of ear-protecting earwax. If your eardrum is ruptured, the skin debris can pass into your middle ear and form a cyst.    A cholesteatoma provides a friendly environment for bacteria and contains proteins that can damage bones of your middle ear.    Prevention  Follow these tips to avoid a ruptured or perforated eardrum:    Get treatment for middle ear infections. Be aware of the signs and symptoms of middle ear infection, including earache, fever, nasal congestion and reduced hearing. Children with a middle ear infection often rub or pull on their ears. Seek prompt evaluation from your primary care doctor to prevent potential damage to the eardrum.  Protect your ears during flight. If possible, don't fly if you have a cold or an active allergy that causes nasal or ear congestion. During takeoffs and landings, keep your ears clear with pressure-equalizing earplugs, yawning or chewing gum. Or use the Valsalva maneuver — gently blowing, as if blowing your nose, while pinching your nostrils and keeping your mouth closed. Don't sleep during ascents and descents.  Keep your ears free of foreign objects. Never attempt to dig out excess or hardened earwax with items such as a cotton swab, paper clip or hairpin. These items can easily tear or puncture your eardrum. Teach your children about the damage that can be done by putting foreign objects in their ears.  Guard against excessive noise. Protect your ears from unnecessary damage by wearing protective earplugs or earmuffs in your workplace or during recreational activities if loud noise is present.

## 2024-11-19 NOTE — ED PROVIDER NOTE - CARE PROVIDER_API CALL
Dr. Estrella Cuevas,   [address], [place] [address]  Phone: [phone]  Phone: (   )    -  Fax: (   )    -  Follow Up Time: Urgent

## 2024-11-19 NOTE — ED PROVIDER NOTE - PATIENT PORTAL LINK FT
You can access the FollowMyHealth Patient Portal offered by [facility] by registering at the following website: [email or web address]. By joining [facility]’s FollowMyHealth portal, you will also be able to view your health information using other applications (apps) compatible with our system.

## 2024-11-19 NOTE — ED ADULT TRIAGE NOTE - CHIEF COMPLAINT QUOTE
Pt states c/o right ear pain, w/ drainage and hearing loss.  3 days ago he fell asleep on his cell phone, he heard a crack realized his phone fell on the floor reached to get it and felt a "pull or crack" In the back of  his right ear, w/ some blood noted at first.  pt states he hasn't been able to sleep since.    pt has hx ca in his nose Pt states c/o right ear pain, w/ drainage and hearing loss.  3 days ago he fell asleep on his cell phone, he heard a crack realized his phone fell on the floor reached to get it and felt a "pull or crack" In the back of  his right ear, w/ some blood noted at first.  pt states he hasn't been able to sleep since.  pt denies any injury to ear or head strike    pt has hx ca in his nose

## 2024-11-19 NOTE — ED PROVIDER NOTE - OBJECTIVE STATEMENT
79-year-old male with a past medical history of CAD with 3 stents, AICD, hypertension, and hyperlipidemia presents to the ED for evaluation of pain to the right ear associated with drainage that began 3 nights ago.  Patient reports 3 nights ago he was laying down and his phone fell on the floor when he got out of bed and forward to pick his phone up he "heard a crack from his right ear" and immediately felt pain.  Patient reports since then he has been having drainage from the right ear.  Patient reports he can hear from his right ear.  Patient denies fevers, inserting Q-tips or other foreign bodies into the ear, sore throat, headache, dizziness, runny nose, or recent trauma.

## 2024-11-19 NOTE — ED ADULT NURSE NOTE - OBJECTIVE STATEMENT
a/o x 3 c/o right ear pain and drainage. pt states that he dropped his phone on the floor, and when reaching down to get his phone, he heard a pop in his ear accompanied by pain and drainage.

## 2024-11-19 NOTE — CONSULT NOTE ADULT - ASSESSMENT
80y/o M with RIGHT TM rupture    - No acute ENT intervention at this time  - Floxin 5gtts BID to RIGHT ear x1wk  - F/u with Dr. Cuevas in one week to reassess  - D/w ED Team .

## 2024-11-19 NOTE — ED PROVIDER NOTE - ATTENDING CONTRIBUTION TO CARE
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 Rpts that he reached down over side of his bed to  his cell phone that had dropped on floor, 3d prior, and heard a "crack" behind his R ear, first with some bloody otorrhea. No HT. No f/c, denies any recent uri sx, sore throat, cough, or neck pain,    PE: as per PA note

## 2024-11-19 NOTE — ED PROVIDER NOTE - NSFOLLOWUPCLINICS_GEN_ALL_ED_FT
[facility]  ENT  [address], [address]nd [place] [address]  Phone: [phone]  Fax:   Follow Up Time: 7-10 Days

## 2024-11-19 NOTE — ED PROVIDER NOTE - NS_EDPROVIDERDISPOUSERTYPE_ED_A_ED
Pre-Operative Instructions    Patient name: Yeimi Hanks         We have scheduled you today for a GI procedure that will be performed sometime within the next few months. Because we realize that there may be some changes in the maintenance of your health after today, but prior to your procedure, we ask that you note the followin. If you have any additional medications that are added to the current medications you are taking, please notify our office so that we can properly instruct you in how to take this around the time of your procedure. For example,  if you started on any type of blood thinner, any type of anti inflammatory medication, or any type of iron supplement or vitamin, these medications will need to be stopped, depending on what they are, several days BEFORE the procedure. If you are given pain medications or any new type of heart medication or blood pressure pills or are a newly diagnoses diabetic put on blood sugar medication, we may also need to make adjustments regarding these.  2. If you have any type of device implanted (pacemaker, defibrillator, implanted pain device or stimulator, or have any kind of joint replacement) please let us know, as special arrangements may need to be made in order for the procedure to be performed.  3. If you develop new allergies, such as Latex or Versed, special arrangements may need to be made.  4. We will also need to be made aware of any changes in your insurance as we want to be sure that you receive the full benefit of your plan.      We understand that situations may arise causing you to cancel and/or reschedule your procedure date. We would appreciate at least two weeks notice. Thank you for your cooperation.    The staff of the [facility] offers these suggestions if you are scheduled for surgery.    1. Plan for unforeseen changes in surgery time. Although we try to maintain a set surgery schedule, there are times when  a surgery case may take longer than expected. This may result in your being in the surgery center longer than originally planned.      2. Arrange for an adult to stay with you at the surgery center. It is very important that you have an adult stay with you at the surgery center during your procedure. The medications you receive can make you sleepy and forgetful. For this reason, the doctor may want to discuss your surgery and post-operative care with an adult friend or family member. Additionally, an adult will be needed to drive you home. An adult must stay with you for the first 24 hours after your surgery. IF YOU DO  NOT HAVE AN ADULT TO DRIVE YOU HOME, YOUR SURGERY WILL BE CANCELLED.     3. Remember to follow pre-operative dietary restrictions. An empty stomach is imperative to prevent nausea or vomiting during and after your procedure.      4. Things to bring with you:  · a list of your current medications (including \"over the counter\" and herbal medications)  · important legal documents such as Power of , Guardianship papers  · any assistive device you are currently using (crutches, walker, hearing aid, etc.)    5. Limit visitors while you are at the surgery center. The time spent at the surgery center is very brief and will be limited to preparing you for surgery and assisting you to recover afterwards. Surgery center rooms are very small, which requires us to limit the number of visitors allowed in at one time.      6. Avoid alcoholic beverages. Because you will be asked to \"fast\" before your procedure, your body will be in a naturally dehydrated state. Alcohol will add to this dehydration making you more prone to problems with blood pressure during and after your procedure You should avoid alcoholic beverages for at least 24 hours prior to your surgery.    7. Wear loose comfortable clothes. Clothes that are easy to put on and take off are best. Sweat pants, shirts with buttons, and slip-on shoes are  wise choices. Avoid tight-fitting clothing such as blue jeans and turtlenecks.    8. Patients on Anticoagulation Medications:   IF YOU HAVE NOT RECEIVED INSTRUCTIONS REGARDING YOUR BLOOD THINNING MEDICATIONS WITHIN 7 DAYS OF YOUR SURGERY, PLEASE CALL THE GI DOCTOR'S OFFICE. FAILURE TO DO SO MAY RESULT IN CANCELLATION OF YOUR SURGERY.     9. Ask questions and insist on answers. Surgery can be a stressful time for anyone. If you have any questions or are unsure about any information given to you, be sure to ask for clarification. A patient can never ask too many questions. If there is something on your mind, let us know. We always want you to feel safe and confident in the care you are receiving.         TO ALL [facility] AMBULATORY SURGERY PATIENTS    You can decide today about the care you will receive in the future. [facility] respects your rights and will support your decisions to the fullest extent permitted by law, including your right to refuse or accept medical or surgical treatment. Please be advised that the [facility] prohibits ambulatory surgery centers from upholding Advanced Directives during surgical procedures. For this reason, any Advanced Directive will be null and void during your stay in the [facility] [place].     Although not honored in the [facility] [place], you are still entitled to be informed about your rights surrounding Advanced Directives. Advanced Directives are legal documents that enable you to specify what forms of treatment you want performed or withheld should you become unable to make or communicate these decisions on your own.  Although this is not a common decision made by outpatient surgery patients, we would like to let you know that an information booklet, \"A Personal Decision\" is available upon your request.      How do you know if an Advanced Directive is  right for you?  It is important to realize your rights as an individual, what the nature of consent for treatment implies, what a durable power of  for health care is and what a living will is.      After reviewing the booklet, \"A Personal Decision,\" should you have any further questions, please call us at [phone].      Thank you.     PATIENT’S RIGHTS    I. To be treated with respect, consideration and dignity, free from all forms of abuse, harassment and discrimination.     II. To receive quality care and high professional standards in a safe environment.    II. To be provided with appropriate privacy.    III. To expect that all disclosures and records are treated confidentially and, except when required by law, to be given the opportunity to approve or refuse their release.    IV. To be provided, to the degree known, complete information concerning their diagnosis, treatment and prognosis. When appropriate, the information is provided to a person designated by the patient to be a legally authorized person.    V. To review the records pertaining to his/her medical care and to have the information explained or interpreted as necessary except when restricted law.    VI. To expect that we will communicate with you in a matter that you can understand.     VII. To be given the names of all practitioners and health care personnel participating in his/her care.    VIII. To make decisions about the plan of care prior to and during the course of treatment.  IX. To refuse a recommended treatment or plan of care to the extent permitted by law and to be informed of the medical consequences of this action.     X. To expect that your treatment preferences will be responded to as delineated in your Advanced Directive, within the limits of the [facility] bylaws regarding resuscitation    XI. To be informed as to:  A. Rights and Responsibilities.  B. Services available in the organization.  C. Provisions for after-hours and  emergency care.  D.  The charges for services and available payment options.   E. The right to consent or decline participation in proposed research  studies.  F.  The available resources for resolving disputes, grievances, and conflicts.      XII. To expect emergency procedures to be implemented without unnecessary delay.    XIII. To expect a safe hospital transfer with appropriate medical records when necessary.     XIV. To know that all patients rights extend to the patient’s legal representatives.     XV. Complaints regarding Patients Rights or any issue surrounding care received during your stay can be made by contacting any of the following organizations:  i. Medicare patient: Visit the Office of Medicare Beneficiary Ombudsman at www.medicare.gov on the web.  Or call 1-800-Medicare (1-104.929.4639).     TTY users should call 1-917.148.4164.  ii. Non-Medicare patients can contact the [facility] [facility] at [phone]; fax [phone], TTY [phone].  iii. Any patient can also contact the [facility] at [phone] (toll free 8:30 am to 5:00 pm, central time, weekdays).        Patient Responsibilities    It is your responsibility as a [facility] patient:    · To provide all personal and family health information needed to provide you with appropriate care.    · To participate to the best of your ability in making decisions about your medical treatment, and to comply with the agreed upon plan of care.    · To ask questions of your physician or other care providers when you do not understand any information or instructions.    · To maintain appointments as scheduled, or to reschedule in a timely fashion.    · To inform your physician and other care providers if you anticipate problems in following prescribed treatment.    · To recognize the impact of your lifestyle on your personal health.    · To inform your physician or other care provider if you desire a  transfer of care to another physician.    · To be considerate of others receiving and providing care.  To observe relevant surgery center policies and procedures.    · To accept financial responsibility for health care services and to work cooperatively with [facility] to resolve financial obligations     Attending Attestation (For Attendings USE Only)...

## 2024-11-19 NOTE — ED PROVIDER NOTE - PROVIDER TOKENS
FREE:[LAST:[Dr. Estrella Cuevas],PHONE:[(   )    -],FAX:[(   )    -],ADDRESS:[[address] [place] [address]  Phone: [phone]],FOLLOWUP:[Urgent]]

## 2024-11-19 NOTE — ED PROVIDER NOTE - PHYSICAL EXAMINATION
Physical Exam    Vital Signs: I have reviewed the initial vital signs.  Constitutional: well-nourished, appears stated age, no acute distress  Eyes: Conjunctiva pink, Sclera clear, PERRLA, EOMI without pain.   ENT: Oropharynx is clear without lesions. uvula midline. no tonsillar erythema, edema, or exudates. no stridor. (+) mild tenderness to the right post auricular region without erythema. no tenderness over the right mastoid. clear to yellow drainage draining from the right ear. no erythema, or edema of the right ear canal. (+) right TM perforation visible. no tenderness or erythema over the left mastoid. no left ear canal edema, erythema, or drainage. left tm intact without erythema or bulging.    Cardiovascular:  well-perfused extremities  Respiratory: unlabored respiratory effort, pt is speaking full sentences. no accessory muscle use.   Musculoskeletal: FROM of b/l upper and lower extremities  Integumentary: warm, dry, no rash  Neurologic: awake, alert  Psychiatric: appropriate mood, appropriate affect

## 2024-11-25 ENCOUNTER — APPOINTMENT (OUTPATIENT)
Dept: ORTHOPEDIC SURGERY | Facility: CLINIC | Age: 79
End: 2024-11-25
Payer: MEDICARE

## 2024-11-25 DIAGNOSIS — M17.11 UNILATERAL PRIMARY OSTEOARTHRITIS, RIGHT KNEE: ICD-10-CM

## 2024-11-25 PROCEDURE — 99213 OFFICE O/P EST LOW 20 MIN: CPT

## 2024-11-25 NOTE — CHART NOTE - NSCHARTNOTEFT_GEN_A_CORE
SPECIALTY: ENT    [facility] MRN [id number] - Left v/m, CT 11/20.  appt requested -  11/20 / ENT left message 11/25 - LORA

## 2024-11-27 ENCOUNTER — APPOINTMENT (OUTPATIENT)
Dept: ORTHOPEDIC SURGERY | Facility: CLINIC | Age: 79
End: 2024-11-27

## 2024-12-25 PROBLEM — F10.90 ALCOHOL USE: Status: ACTIVE | Noted: 2017-07-10

## 2025-01-10 NOTE — ASU PREOP CHECKLIST - HEIGHT IN FEET
5 87-year-old male, AAOx3, ambulates with a  walker with left eye blindness, HTN, cad s/p CABG , hld, presents reporting episode of right eye vision loss that occurred 3 days ago.  Patient reports upon waking having a period of inability to see upon waking up in his right eye for about 15 minutes on and off.  Reports the following day he saw his primary care doctor who then scheduled him to see ophthalmologist.  Reports yesterday being seen by ophthalmology and being told today to come to the ED out of concern for possible CVA versus TIA.   His vision now has normalized. He reports this has never happened to him before. Denies any fever, chills, n/v, cp, sob, abdominal pain, n/v, or diarrhea.     ED course:  Vitals: temp 98, HR 64, /73  cth/angio/neck: neg

## 2025-02-13 ENCOUNTER — APPOINTMENT (OUTPATIENT)
Dept: UROLOGY | Facility: CLINIC | Age: 80
End: 2025-02-13
Payer: MEDICARE

## 2025-02-13 VITALS
WEIGHT: 191 LBS | HEIGHT: 70 IN | BODY MASS INDEX: 27.35 KG/M2 | SYSTOLIC BLOOD PRESSURE: 126 MMHG | HEART RATE: 97 BPM | DIASTOLIC BLOOD PRESSURE: 81 MMHG

## 2025-02-13 DIAGNOSIS — E29.1 TESTICULAR HYPOFUNCTION: ICD-10-CM

## 2025-02-13 PROCEDURE — 99214 OFFICE O/P EST MOD 30 MIN: CPT | Mod: 25

## 2025-02-13 PROCEDURE — 11980 IMPLANT HORMONE PELLET(S): CPT

## 2025-05-05 ENCOUNTER — APPOINTMENT (OUTPATIENT)
Dept: OPHTHALMOLOGY | Facility: CLINIC | Age: 80
End: 2025-05-05
Payer: MEDICARE

## 2025-05-05 ENCOUNTER — OUTPATIENT (OUTPATIENT)
Dept: OUTPATIENT SERVICES | Facility: HOSPITAL | Age: 80
LOS: 1 days | End: 2025-05-05
Payer: MEDICARE

## 2025-05-05 DIAGNOSIS — I25.10 ATHEROSCLEROTIC HEART DISEASE OF NATIVE CORONARY ARTERY WITHOUT ANGINA PECTORIS: Chronic | ICD-10-CM

## 2025-05-05 DIAGNOSIS — Z90.89 ACQUIRED ABSENCE OF OTHER ORGANS: Chronic | ICD-10-CM

## 2025-05-05 DIAGNOSIS — H53.8 OTHER VISUAL DISTURBANCES: ICD-10-CM

## 2025-05-05 PROCEDURE — 92004 COMPRE OPH EXAM NEW PT 1/>: CPT

## 2025-05-05 PROCEDURE — 92014 COMPRE OPH EXAM EST PT 1/>: CPT

## 2025-05-05 PROCEDURE — 92134 CPTRZ OPH DX IMG PST SGM RTA: CPT | Mod: 26

## 2025-05-05 PROCEDURE — 92134 CPTRZ OPH DX IMG PST SGM RTA: CPT

## 2025-05-08 DIAGNOSIS — H35.30 UNSPECIFIED MACULAR DEGENERATION: ICD-10-CM

## 2025-05-08 DIAGNOSIS — H33.033 RETINAL DETACHMENT WITH GIANT RETINAL TEAR, BILATERAL: ICD-10-CM

## 2025-05-15 ENCOUNTER — APPOINTMENT (OUTPATIENT)
Dept: OTOLARYNGOLOGY | Facility: CLINIC | Age: 80
End: 2025-05-15

## 2025-05-15 VITALS — BODY MASS INDEX: 27.35 KG/M2 | HEIGHT: 70 IN | WEIGHT: 191 LBS

## 2025-05-15 DIAGNOSIS — R42 DIZZINESS AND GIDDINESS: ICD-10-CM

## 2025-05-15 DIAGNOSIS — H90.A31 MIXED CONDUCTIVE AND SENSORINEURAL HEARING LOSS, UNILATERAL, RIGHT EAR WITH RESTRICTED HEARING ON THE  CONTRALATERAL SIDE: ICD-10-CM

## 2025-05-15 DIAGNOSIS — C31.9 MALIGNANT NEOPLASM OF ACCESSORY SINUS, UNSPECIFIED: ICD-10-CM

## 2025-05-15 DIAGNOSIS — R47.1 DYSARTHRIA AND ANARTHRIA: ICD-10-CM

## 2025-05-15 DIAGNOSIS — J32.4 CHRONIC PANSINUSITIS: ICD-10-CM

## 2025-05-15 DIAGNOSIS — H66.91 OTITIS MEDIA, UNSPECIFIED, RIGHT EAR: ICD-10-CM

## 2025-05-15 PROCEDURE — 31231 NASAL ENDOSCOPY DX: CPT

## 2025-05-15 PROCEDURE — 99204 OFFICE O/P NEW MOD 45 MIN: CPT | Mod: 25

## 2025-05-15 PROCEDURE — 99203 OFFICE O/P NEW LOW 30 MIN: CPT | Mod: 25

## 2025-05-29 ENCOUNTER — APPOINTMENT (OUTPATIENT)
Dept: OTOLARYNGOLOGY | Facility: CLINIC | Age: 80
End: 2025-05-29
Payer: MEDICARE

## 2025-05-29 PROCEDURE — 99213 OFFICE O/P EST LOW 20 MIN: CPT

## 2025-06-05 ENCOUNTER — APPOINTMENT (OUTPATIENT)
Dept: UROLOGY | Facility: CLINIC | Age: 80
End: 2025-06-05
Payer: MEDICARE

## 2025-06-05 VITALS
RESPIRATION RATE: 18 BRPM | HEIGHT: 70 IN | SYSTOLIC BLOOD PRESSURE: 117 MMHG | BODY MASS INDEX: 27.2 KG/M2 | WEIGHT: 190 LBS | DIASTOLIC BLOOD PRESSURE: 72 MMHG | OXYGEN SATURATION: 96 % | HEART RATE: 58 BPM

## 2025-06-05 DIAGNOSIS — E29.1 TESTICULAR HYPOFUNCTION: ICD-10-CM

## 2025-06-05 PROCEDURE — G2211 COMPLEX E/M VISIT ADD ON: CPT

## 2025-06-05 PROCEDURE — 99214 OFFICE O/P EST MOD 30 MIN: CPT

## 2025-06-05 RX ORDER — TESTOSTERONE 20.25 MG/1.25G
20.25 MG/ACT GEL, METERED TRANSDERMAL
Qty: 2 | Refills: 0 | Status: ACTIVE | COMMUNITY
Start: 2025-06-05 | End: 1900-01-01

## 2025-06-11 ENCOUNTER — APPOINTMENT (OUTPATIENT)
Dept: OTOLARYNGOLOGY | Facility: CLINIC | Age: 80
End: 2025-06-11
Payer: MEDICARE

## 2025-06-11 PROCEDURE — V5010 ASSESSMENT FOR HEARING AID: CPT | Mod: NC

## 2025-06-16 NOTE — OCCUPATIONAL THERAPY INITIAL EVALUATION ADULT - LEVEL OF INDEPENDENCE: SIT/STAND, REHAB EVAL
Health Maintenance Topic(s) Outreach Outcomes & Actions Taken:    Breast Cancer Screening - Outreach Outcomes & Actions Taken  : External Records Requested & Care Team Updated if Applicable and GAURAV sent to HERNAN/Campos       Additional Notes:            stand-by assist

## 2025-06-26 ENCOUNTER — APPOINTMENT (OUTPATIENT)
Dept: OTOLARYNGOLOGY | Facility: CLINIC | Age: 80
End: 2025-06-26
Payer: MEDICARE

## 2025-06-26 PROCEDURE — V5257G: CUSTOM | Mod: LT

## 2025-07-03 ENCOUNTER — APPOINTMENT (OUTPATIENT)
Dept: OTOLARYNGOLOGY | Facility: CLINIC | Age: 80
End: 2025-07-03
Payer: MEDICARE

## 2025-07-03 PROCEDURE — V5299A: CUSTOM

## 2025-07-16 ENCOUNTER — APPOINTMENT (OUTPATIENT)
Dept: OTOLARYNGOLOGY | Facility: CLINIC | Age: 80
End: 2025-07-16
Payer: MEDICARE

## 2025-07-16 PROCEDURE — V5299A: CUSTOM

## 2025-07-25 ENCOUNTER — APPOINTMENT (OUTPATIENT)
Dept: OTOLARYNGOLOGY | Facility: CLINIC | Age: 80
End: 2025-07-25
Payer: MEDICARE

## 2025-07-25 PROCEDURE — V5264E: CUSTOM | Mod: LT

## 2025-07-31 ENCOUNTER — APPOINTMENT (OUTPATIENT)
Dept: UROLOGY | Facility: CLINIC | Age: 80
End: 2025-07-31
Payer: MEDICARE

## 2025-07-31 VITALS
SYSTOLIC BLOOD PRESSURE: 121 MMHG | WEIGHT: 195 LBS | HEART RATE: 67 BPM | DIASTOLIC BLOOD PRESSURE: 75 MMHG | RESPIRATION RATE: 18 BRPM | BODY MASS INDEX: 27.92 KG/M2 | HEIGHT: 70 IN | OXYGEN SATURATION: 94 %

## 2025-07-31 DIAGNOSIS — E29.1 TESTICULAR HYPOFUNCTION: ICD-10-CM

## 2025-07-31 PROCEDURE — 99215 OFFICE O/P EST HI 40 MIN: CPT

## 2025-07-31 PROCEDURE — G2211 COMPLEX E/M VISIT ADD ON: CPT

## 2025-08-07 RX ORDER — TESTOSTERONE UNDECANOATE 250 MG/ML
750 INJECTION INTRAMUSCULAR
Qty: 1 | Refills: 0 | Status: ACTIVE | COMMUNITY
Start: 2025-07-31 | End: 1900-01-01

## 2025-08-12 ENCOUNTER — APPOINTMENT (OUTPATIENT)
Facility: CLINIC | Age: 80
End: 2025-08-12
Payer: MEDICARE

## 2025-08-12 ENCOUNTER — NON-APPOINTMENT (OUTPATIENT)
Age: 80
End: 2025-08-12

## 2025-08-12 PROCEDURE — 99204 OFFICE O/P NEW MOD 45 MIN: CPT

## 2025-08-12 PROCEDURE — 76512 OPH US DX B-SCAN: CPT | Mod: RT

## 2025-08-12 PROCEDURE — 92235 FLUORESCEIN ANGRPH MLTIFRAME: CPT

## 2025-08-12 PROCEDURE — 92250 FUNDUS PHOTOGRAPHY W/I&R: CPT

## 2025-08-15 ENCOUNTER — APPOINTMENT (OUTPATIENT)
Dept: OTOLARYNGOLOGY | Facility: CLINIC | Age: 80
End: 2025-08-15
Payer: MEDICARE

## 2025-08-15 PROCEDURE — V5299A: CUSTOM
